# Patient Record
Sex: MALE | Race: WHITE | NOT HISPANIC OR LATINO | Employment: OTHER | URBAN - NONMETROPOLITAN AREA
[De-identification: names, ages, dates, MRNs, and addresses within clinical notes are randomized per-mention and may not be internally consistent; named-entity substitution may affect disease eponyms.]

---

## 2020-10-29 ENCOUNTER — OFFICE VISIT (OUTPATIENT)
Dept: CARDIOLOGY | Facility: CLINIC | Age: 60
End: 2020-10-29

## 2020-10-29 VITALS
HEIGHT: 75 IN | DIASTOLIC BLOOD PRESSURE: 80 MMHG | OXYGEN SATURATION: 100 % | WEIGHT: 227 LBS | HEART RATE: 78 BPM | BODY MASS INDEX: 28.23 KG/M2 | SYSTOLIC BLOOD PRESSURE: 138 MMHG

## 2020-10-29 DIAGNOSIS — Z82.49 FAMILY HISTORY OF EARLY CAD: ICD-10-CM

## 2020-10-29 DIAGNOSIS — I35.1 NONRHEUMATIC AORTIC VALVE INSUFFICIENCY: ICD-10-CM

## 2020-10-29 DIAGNOSIS — Q23.1 AORTIC REGURGITATION DUE TO BICUSPID AORTIC VALVE: Primary | ICD-10-CM

## 2020-10-29 DIAGNOSIS — E78.2 MIXED HYPERLIPIDEMIA: ICD-10-CM

## 2020-10-29 DIAGNOSIS — I51.7 LVH (LEFT VENTRICULAR HYPERTROPHY): ICD-10-CM

## 2020-10-29 PROCEDURE — 99204 OFFICE O/P NEW MOD 45 MIN: CPT | Performed by: INTERNAL MEDICINE

## 2020-10-29 PROCEDURE — 93000 ELECTROCARDIOGRAM COMPLETE: CPT | Performed by: INTERNAL MEDICINE

## 2020-10-29 RX ORDER — SIMVASTATIN 40 MG
40 TABLET ORAL NIGHTLY
COMMUNITY
End: 2021-03-15 | Stop reason: SDUPTHER

## 2020-10-29 RX ORDER — LATANOPROST 50 UG/ML
1 SOLUTION/ DROPS OPHTHALMIC NIGHTLY
COMMUNITY
End: 2021-10-12 | Stop reason: ALTCHOICE

## 2020-10-29 NOTE — PROGRESS NOTES
Melecio Magallon  [id number]  1960  60 y.o.  male    Referring Provider: Roberto Doss MD    Reason for  Visit:  Initial visit to establish care with myself for ongoing longitudinal care related to cardiovascular issues   Bicuspid aortic valve with moderate aortic insufficiency  Was seen in [facility]     Subjective    Overall feeling well   No chest pain or shortness of breath     No palpitations  No significant pedal edema    Compliant with medications and dietary advice  Good effort tolerance    No presyncope or syncope  Compliant with medications    Tolerating current medications well with no untoward side effects   Compliant with prescribed medication regimen. Tries to adhere to cardiac diet.        History of present illness:  Melecio Magallon is a 60 y.o. yo male with history of bicuspid aortic valve moderate aortic insufficiency  who presents today for   Chief Complaint   Patient presents with   • Establish Care     new pt -  MODERATE AS WITH BICUSPID VALVE   .    History  Past Medical History:   Diagnosis Date   • Aortic regurgitation    • Hyperlipidemia    ,   History reviewed. No pertinent surgical history.,   History reviewed. No pertinent family history.,   Social History     Tobacco Use   • Smoking status: Never Smoker   • Smokeless tobacco: Never Used   Substance Use Topics   • Alcohol use: Yes     Frequency: Never   • Drug use: Never   ,     Medications  Current Outpatient Medications   Medication Sig Dispense Refill   • latanoprost (XALATAN) 0.005 % ophthalmic solution 1 drop Every Night.     • simvastatin (ZOCOR) 40 MG tablet Take 40 mg by mouth Every Night.       No current facility-administered medications for this visit.        Allergies:  Patient has no known allergies.    Review of Systems  Review of Systems   Constitution: Negative.   HENT: Negative.    Eyes: Negative.    Cardiovascular: Negative for chest pain, claudication, cyanosis, dyspnea on exertion, irregular heartbeat,  "leg swelling, near-syncope, orthopnea, palpitations, paroxysmal nocturnal dyspnea and syncope.   Respiratory: Negative.    Endocrine: Negative.    Hematologic/Lymphatic: Negative.    Skin: Negative.    Gastrointestinal: Negative for anorexia.   Genitourinary: Negative.    Neurological: Negative.    Psychiatric/Behavioral: Negative.        Objective     Physical Exam:  /80   Pulse 78   Ht 190.5 cm (75\")   Wt 103 kg (227 lb)   SpO2 100%   BMI 28.37 kg/m²     Physical Exam  Constitutional:       Appearance: He is well-developed.   HENT:      Head: Normocephalic.   Neck:      Musculoskeletal: No edema.      Vascular: Normal carotid pulses. No carotid bruit or JVD.      Trachea: No tracheal tenderness or tracheal deviation.   Cardiovascular:      Rate and Rhythm: Regular rhythm.      Pulses: Normal pulses.      Heart sounds: Murmur present. Systolic murmur present with a grade of 2/6. Diastolic murmur present with a grade of 2/4.   Pulmonary:      Effort: Pulmonary effort is normal.      Breath sounds: No stridor.   Abdominal:      General: There is no distension.      Palpations: Abdomen is soft.      Tenderness: There is no abdominal tenderness.   Skin:     General: Skin is warm.   Neurological:      Mental Status: He is alert.      Cranial Nerves: No cranial nerve deficit.      Sensory: No sensory deficit.   Psychiatric:         Speech: Speech normal.         Behavior: Behavior normal.         Results Review:           ____________________________________________________________________________________________________________________________________________  Health maintenance and recommendations    Low salt/ HTN/ Heart healthy carbohydrate restricted cardiac diet   The patient is advised to reduce or avoid caffeine or other cardiac stimulants.   Minimize or avoid  NSAID-type medications      Monitor for any signs of bleeding including red or dark stools. Fall precautions.   Advised staying uptodate with " immunizations per established standard guidelines.    Offered to give patient  a copy of my notes     Questions were encouraged, asked and answered to the patient's  understanding and satisfaction. Questions if any regarding current medications and side effects, need for refills and importance of compliance to medications stressed.    Reviewed available prior notes, consults, prior visits, laboratory findings, radiology and cardiology relevant reports. Updated chart as applicable. I have reviewed the patient's medical history in detail and updated the computerized patient record as relevant.      Updated patient regarding any new or relevant abnormalities on review of records or any new findings on physical exam. Mentioned to patient about purpose of visit and desirable health short and long term goals and objectives.    Primary to monitor CBC CMP Lipid panel and TSH as applicable    ___________________________________________________________________________________________________________________________________________         ECG 12 Lead    Date/Time: 10/29/2020 8:56 AM  Performed by: Raul Monteiro MD  Authorized by: Raul Monteiro MD   Comparison: not compared with previous ECG   Rhythm: sinus rhythm  Rate: normal  Conduction: conduction normal  Q waves: III and aVF    QRS axis: normal    Clinical impression: abnormal EKG            Assessment/Plan   Diagnoses and all orders for this visit:    1. Aortic regurgitation due to bicuspid aortic valve (Primary)  -     Adult Transthoracic Echo Complete W/ Cont if Necessary Per Protocol; Future    2. Nonrheumatic aortic valve insufficiency    3. Mixed hyperlipidemia  -     CT Cardiac Calcium Score Without Dye; Future    4. LVH (left ventricular hypertrophy)    5. Family history of early CAD    Other orders  -     ECG 12 Lead        Plan      Orders Placed This Encounter   Procedures   • CT Cardiac Calcium Score Without Dye     Standing Status:   Future     Standing  Expiration Date:   10/29/2021   • ECG 12 Lead     This order was created via procedure documentation   • Adult Transthoracic Echo Complete W/ Cont if Necessary Per Protocol     Myocardial strain to be performed during echocardiogram as long as technically feasible     Standing Status:   Future     Standing Expiration Date:   10/29/2021     Order Specific Question:   Reason for exam?     Answer:   Valvular Function     Call for results of calcium score to direct therapy including Aspirin and statin     Check BP and heart rates twice daily at least 3x / week at home and bring a recording for me to review next visit  If BP >140/90 or < 100/60 persistently over 3 reading 30 mins apart call sooner     Absolute aortic root size is enlarged but indexed is OK       Return in about 5 months (around 3/29/2021).

## 2020-11-11 ENCOUNTER — HOSPITAL ENCOUNTER (OUTPATIENT)
Dept: CT IMAGING | Facility: HOSPITAL | Age: 60
Discharge: HOME | End: 2020-11-11
Admitting: INTERNAL MEDICINE

## 2020-11-11 DIAGNOSIS — E78.2 MIXED HYPERLIPIDEMIA: ICD-10-CM

## 2020-11-11 PROCEDURE — 75571 CT HRT W/O DYE W/CA TEST: CPT | Performed by: INTERNAL MEDICINE

## 2020-11-11 PROCEDURE — 75571 CT HRT W/O DYE W/CA TEST: CPT

## 2020-11-16 ENCOUNTER — TELEPHONE (OUTPATIENT)
Dept: CARDIOLOGY | Facility: CLINIC | Age: 60
End: 2020-11-16

## 2020-11-16 NOTE — TELEPHONE ENCOUNTER
PT CALLED WOULD LIKE TO KNOW THE RESULTS OF CTA DONE ON 11/11/20    ALSO NEEDS TO KNOW IF YOU ARE GOING TO CHANGE SIMVASTATIN TO CRESTOR?    PLEASE ADVISE

## 2020-12-05 ENCOUNTER — APPOINTMENT (OUTPATIENT)
Dept: CT IMAGING | Facility: HOSPITAL | Age: 60
End: 2020-12-05

## 2020-12-05 ENCOUNTER — HOSPITAL ENCOUNTER (EMERGENCY)
Facility: HOSPITAL | Age: 60
Discharge: HOME | End: 2020-12-05
Attending: EMERGENCY MEDICINE | Admitting: EMERGENCY MEDICINE

## 2020-12-05 VITALS
DIASTOLIC BLOOD PRESSURE: 80 MMHG | TEMPERATURE: 98.1 F | HEIGHT: 75 IN | BODY MASS INDEX: 28.85 KG/M2 | SYSTOLIC BLOOD PRESSURE: 136 MMHG | HEART RATE: 59 BPM | OXYGEN SATURATION: 100 % | RESPIRATION RATE: 16 BRPM | WEIGHT: 232 LBS

## 2020-12-05 DIAGNOSIS — R10.9 RIGHT FLANK PAIN: ICD-10-CM

## 2020-12-05 DIAGNOSIS — N23 RENAL COLIC ON RIGHT SIDE: ICD-10-CM

## 2020-12-05 DIAGNOSIS — R39.15 URINARY URGENCY: ICD-10-CM

## 2020-12-05 DIAGNOSIS — N13.2 HYDRONEPHROSIS WITH URINARY OBSTRUCTION DUE TO URETERAL CALCULUS: Primary | ICD-10-CM

## 2020-12-05 LAB
ALBUMIN SERPL-MCNC: 4.8 G/DL (ref 3.5–5.2)
ALBUMIN/GLOB SERPL: 2.7 G/DL
ALP SERPL-CCNC: 64 U/L (ref 39–117)
ALT SERPL W P-5'-P-CCNC: 18 U/L (ref 1–41)
ANION GAP SERPL CALCULATED.3IONS-SCNC: 10 MMOL/L (ref 5–15)
AST SERPL-CCNC: 21 U/L (ref 1–40)
BACTERIA UR QL AUTO: ABNORMAL /HPF
BASOPHILS # BLD AUTO: 0.02 10*3/MM3 (ref 0–0.2)
BASOPHILS NFR BLD AUTO: 0.2 % (ref 0–1.5)
BILIRUB SERPL-MCNC: 0.3 MG/DL (ref 0–1.2)
BILIRUB UR QL STRIP: NEGATIVE
BUN SERPL-MCNC: 22 MG/DL (ref 6–20)
BUN/CREAT SERPL: 19.5 (ref 7–25)
CALCIUM SPEC-SCNC: 9.2 MG/DL (ref 8.6–10.5)
CHLORIDE SERPL-SCNC: 108 MMOL/L (ref 98–107)
CLARITY UR: CLEAR
CO2 SERPL-SCNC: 24 MMOL/L (ref 22–29)
COLOR UR: YELLOW
CREAT SERPL-MCNC: 1.13 MG/DL (ref 0.76–1.27)
D-LACTATE SERPL-SCNC: 1.5 MMOL/L (ref 0.5–2)
DEPRECATED RDW RBC AUTO: 39.3 FL (ref 37–54)
EOSINOPHIL # BLD AUTO: 0.02 10*3/MM3 (ref 0–0.4)
EOSINOPHIL NFR BLD AUTO: 0.2 % (ref 0.3–6.2)
ERYTHROCYTE [DISTWIDTH] IN BLOOD BY AUTOMATED COUNT: 12.5 % (ref 12.3–15.4)
GFR SERPL CREATININE-BSD FRML MDRD: 66 ML/MIN/1.73
GLOBULIN UR ELPH-MCNC: 1.8 GM/DL
GLUCOSE SERPL-MCNC: 122 MG/DL (ref 65–99)
GLUCOSE UR STRIP-MCNC: NEGATIVE MG/DL
HCT VFR BLD AUTO: 38.7 % (ref 37.5–51)
HGB BLD-MCNC: 12.8 G/DL (ref 13–17.7)
HGB UR QL STRIP.AUTO: ABNORMAL
HOLD SPECIMEN: NORMAL
HOLD SPECIMEN: NORMAL
HYALINE CASTS UR QL AUTO: ABNORMAL /LPF
IMM GRANULOCYTES # BLD AUTO: 0.04 10*3/MM3 (ref 0–0.05)
IMM GRANULOCYTES NFR BLD AUTO: 0.4 % (ref 0–0.5)
KETONES UR QL STRIP: NEGATIVE
LEUKOCYTE ESTERASE UR QL STRIP.AUTO: NEGATIVE
LIPASE SERPL-CCNC: 55 U/L (ref 13–60)
LYMPHOCYTES # BLD AUTO: 1.45 10*3/MM3 (ref 0.7–3.1)
LYMPHOCYTES NFR BLD AUTO: 13.2 % (ref 19.6–45.3)
MCH RBC QN AUTO: 28.4 PG (ref 26.6–33)
MCHC RBC AUTO-ENTMCNC: 33.1 G/DL (ref 31.5–35.7)
MCV RBC AUTO: 85.8 FL (ref 79–97)
MONOCYTES # BLD AUTO: 0.4 10*3/MM3 (ref 0.1–0.9)
MONOCYTES NFR BLD AUTO: 3.6 % (ref 5–12)
NEUTROPHILS NFR BLD AUTO: 82.4 % (ref 42.7–76)
NEUTROPHILS NFR BLD AUTO: 9.09 10*3/MM3 (ref 1.7–7)
NITRITE UR QL STRIP: NEGATIVE
NRBC BLD AUTO-RTO: 0 /100 WBC (ref 0–0.2)
PH UR STRIP.AUTO: <=5 [PH] (ref 5–8)
PLATELET # BLD AUTO: 184 10*3/MM3 (ref 140–450)
PMV BLD AUTO: 11.2 FL (ref 6–12)
POTASSIUM SERPL-SCNC: 4.4 MMOL/L (ref 3.5–5.2)
PROT SERPL-MCNC: 6.6 G/DL (ref 6–8.5)
PROT UR QL STRIP: NEGATIVE
RBC # BLD AUTO: 4.51 10*6/MM3 (ref 4.14–5.8)
RBC # UR: ABNORMAL /HPF
REF LAB TEST METHOD: ABNORMAL
SODIUM SERPL-SCNC: 142 MMOL/L (ref 136–145)
SP GR UR STRIP: >1.03 (ref 1–1.03)
SQUAMOUS #/AREA URNS HPF: ABNORMAL /HPF
UROBILINOGEN UR QL STRIP: ABNORMAL
WBC # BLD AUTO: 11.02 10*3/MM3 (ref 3.4–10.8)
WBC UR QL AUTO: ABNORMAL /HPF
WHOLE BLOOD HOLD SPECIMEN: NORMAL

## 2020-12-05 PROCEDURE — 99283 EMERGENCY DEPT VISIT LOW MDM: CPT

## 2020-12-05 PROCEDURE — 25010000003 HYDROMORPHONE 1 MG/ML SOLUTION: Performed by: EMERGENCY MEDICINE

## 2020-12-05 PROCEDURE — 74176 CT ABD & PELVIS W/O CONTRAST: CPT

## 2020-12-05 PROCEDURE — 25010000002 KETOROLAC TROMETHAMINE PER 15 MG: Performed by: EMERGENCY MEDICINE

## 2020-12-05 PROCEDURE — 80053 COMPREHEN METABOLIC PANEL: CPT | Performed by: EMERGENCY MEDICINE

## 2020-12-05 PROCEDURE — 25010000002 MORPHINE PER 10 MG: Performed by: EMERGENCY MEDICINE

## 2020-12-05 PROCEDURE — 83690 ASSAY OF LIPASE: CPT | Performed by: EMERGENCY MEDICINE

## 2020-12-05 PROCEDURE — 96374 THER/PROPH/DIAG INJ IV PUSH: CPT

## 2020-12-05 PROCEDURE — 83605 ASSAY OF LACTIC ACID: CPT | Performed by: EMERGENCY MEDICINE

## 2020-12-05 PROCEDURE — 25010000002 ONDANSETRON PER 1 MG: Performed by: EMERGENCY MEDICINE

## 2020-12-05 PROCEDURE — 81001 URINALYSIS AUTO W/SCOPE: CPT | Performed by: EMERGENCY MEDICINE

## 2020-12-05 PROCEDURE — 96375 TX/PRO/DX INJ NEW DRUG ADDON: CPT

## 2020-12-05 PROCEDURE — 85025 COMPLETE CBC W/AUTO DIFF WBC: CPT | Performed by: EMERGENCY MEDICINE

## 2020-12-05 RX ORDER — KETOROLAC TROMETHAMINE 15 MG/ML
15 INJECTION, SOLUTION INTRAMUSCULAR; INTRAVENOUS ONCE
Status: COMPLETED | OUTPATIENT
Start: 2020-12-05 | End: 2020-12-05

## 2020-12-05 RX ORDER — ONDANSETRON 2 MG/ML
4 INJECTION INTRAMUSCULAR; INTRAVENOUS ONCE
Status: COMPLETED | OUTPATIENT
Start: 2020-12-05 | End: 2020-12-05

## 2020-12-05 RX ORDER — TAMSULOSIN HYDROCHLORIDE 0.4 MG/1
1 CAPSULE ORAL DAILY
Qty: 7 CAPSULE | Refills: 0 | Status: SHIPPED | OUTPATIENT
Start: 2020-12-05 | End: 2020-12-12

## 2020-12-05 RX ORDER — HYDROCODONE BITARTRATE AND ACETAMINOPHEN 5; 325 MG/1; MG/1
1 TABLET ORAL EVERY 6 HOURS PRN
Qty: 8 TABLET | Refills: 0 | Status: SHIPPED | OUTPATIENT
Start: 2020-12-05 | End: 2020-12-07

## 2020-12-05 RX ORDER — KETOROLAC TROMETHAMINE 10 MG/1
10 TABLET, FILM COATED ORAL EVERY 6 HOURS PRN
Qty: 12 TABLET | Refills: 0 | Status: SHIPPED | OUTPATIENT
Start: 2020-12-05 | End: 2020-12-08

## 2020-12-05 RX ORDER — SODIUM CHLORIDE 0.9 % (FLUSH) 0.9 %
10 SYRINGE (ML) INJECTION AS NEEDED
Status: DISCONTINUED | OUTPATIENT
Start: 2020-12-05 | End: 2020-12-05 | Stop reason: HOSPADM

## 2020-12-05 RX ADMIN — MORPHINE SULFATE 4 MG: 4 INJECTION, SOLUTION INTRAMUSCULAR; INTRAVENOUS at 11:13

## 2020-12-05 RX ADMIN — KETOROLAC TROMETHAMINE 15 MG: 15 INJECTION, SOLUTION INTRAMUSCULAR; INTRAVENOUS at 12:20

## 2020-12-05 RX ADMIN — HYDROMORPHONE HYDROCHLORIDE 1 MG: 1 INJECTION, SOLUTION INTRAMUSCULAR; INTRAVENOUS; SUBCUTANEOUS at 12:20

## 2020-12-05 RX ADMIN — SODIUM CHLORIDE, POTASSIUM CHLORIDE, SODIUM LACTATE AND CALCIUM CHLORIDE 1000 ML: 600; 310; 30; 20 INJECTION, SOLUTION INTRAVENOUS at 11:09

## 2020-12-05 RX ADMIN — ONDANSETRON HYDROCHLORIDE 4 MG: 2 SOLUTION INTRAMUSCULAR; INTRAVENOUS at 11:11

## 2020-12-05 NOTE — DISCHARGE INSTRUCTIONS
Please return immediately to the emergency department for any new or worsening symptoms. Please schedule and appointment with Dr. Howard if symptoms persist for further management.

## 2020-12-05 NOTE — ED PROVIDER NOTES
Subjective   This is a 60-year-old gentleman with a past medical history of aortic regurgitation who presents the emergency department chief complaint of right flank pain.  When patient woke up this morning he had urinary urgency.  This then turned into right flank pain.  Gradual in onset is now constant.  Moderate.  Sharp.  Starts in his right flank and radiates to his right groin.  Associated with urinary urgency.  Was seen by his PCP who did a urinalysis which was negative and was sent here for further evaluation.  He denies any headache, vision changes, chest pain, shortness of breath, abdominal pain, nausea, vomiting, testicular pain, fevers, chills, numbness, weakness, or paresthesias.    Past medical history: Aortic regurgitation  Social history: Denies tobacco or illicit drug use, drinks alcohol rarely.      History provided by:  Patient      Review of Systems   All other systems reviewed and are negative.      Past Medical History:   Diagnosis Date   • Aortic regurgitation    • Glaucoma    • Hyperlipidemia        No Known Allergies    History reviewed. No pertinent surgical history.    History reviewed. No pertinent family history.    Social History     Socioeconomic History   • Marital status:      Spouse name: Not on file   • Number of children: Not on file   • Years of education: Not on file   • Highest education level: Not on file   Tobacco Use   • Smoking status: Never Smoker   • Smokeless tobacco: Never Used   Substance and Sexual Activity   • Alcohol use: Yes     Frequency: Never   • Drug use: Never   • Sexual activity: Defer           Objective   Physical Exam  Constitutional:       Appearance: Normal appearance.   HENT:      Head: Normocephalic and atraumatic.      Nose: Nose normal.      Mouth/Throat:      Mouth: Mucous membranes are moist.   Eyes:      Extraocular Movements: Extraocular movements intact.   Neck:      Musculoskeletal: Normal range of motion.   Cardiovascular:      Rate and  Rhythm: Normal rate and regular rhythm.      Pulses: Normal pulses.      Heart sounds: Normal heart sounds. No murmur. No friction rub. No gallop.    Pulmonary:      Effort: Pulmonary effort is normal. No respiratory distress.      Breath sounds: Normal breath sounds. No stridor. No wheezing, rhonchi or rales.   Chest:      Chest wall: No tenderness.   Abdominal:      General: Abdomen is flat. Bowel sounds are normal. There is no distension.      Palpations: Abdomen is soft. There is no mass.      Tenderness: There is no abdominal tenderness. There is no right CVA tenderness, left CVA tenderness, guarding or rebound.      Comments: No CVA tenderness.  No lesions overlying the right flank.   Musculoskeletal: Normal range of motion.         General: No swelling, tenderness, deformity or signs of injury.   Skin:     General: Skin is warm and dry.      Capillary Refill: Capillary refill takes less than 2 seconds.      Coloration: Skin is not jaundiced or pale.      Findings: No bruising, erythema or rash.   Neurological:      General: No focal deficit present.      Mental Status: He is alert and oriented to person, place, and time.   Psychiatric:         Mood and Affect: Mood normal.         Behavior: Behavior normal.         Thought Content: Thought content normal.         Judgment: Judgment normal.         Procedures           ED Course                                           MDM  Number of Diagnoses or Management Options  Hydronephrosis with urinary obstruction due to ureteral calculus: new and requires workup  Renal colic on right side: new and requires workup  Right flank pain: new and requires workup  Urinary urgency: new and requires workup  Diagnosis management comments: Patient presents with right flank pain.  Upon arrival in no acute distress vital signs are reassuring.  IV access is obtained and labs are sent.  He is given fluids, morphine, Zofran.  He is evaluated CT scan of the abdomen and pelvis without  contrast.  Low concern for testicular torsion or epididymitis no testicular pain.  Low concern for abdominal aortic aneurysm with strong bilateral lower extremity pulses and no abdominal pain or tenderness.  Low concern for intra-abdominal pathology with no abdominal tenderness on exam.Lab work shows a CBC with slight leukocytosis, there is also slight anemia, lipase within normal limits, lactic acid is 1.5, CMP with no signs of kidney injury, no elevation of the anion gap, LFTs are normal, urinalysis with 3-5 reds and 0-2 whites with negative nitrites and negative leukocytes, CT scan of the abdomen and pelvis without contrast shows a mild to moderate right-sided obstructive uropathy due to a 2 mm calculus at the UVJ.  There is also a 1.5 mm stone in the right pole of the kidney.  I have discussed the case with Dr. Bonilla Paz.  He will see the patient in clinic.  The patient is comfortable going home.  He is discharged in good condition with normal vitals and is given commonsense return precautions which he verbalizes understanding of.       Amount and/or Complexity of Data Reviewed  Clinical lab tests: ordered and reviewed  Tests in the radiology section of CPT®: ordered and reviewed  Decide to obtain previous medical records or to obtain history from someone other than the patient: yes  Discuss the patient with other providers: yes (Dr. Howard)    Risk of Complications, Morbidity, and/or Mortality  Presenting problems: high  Diagnostic procedures: high  Management options: high    Patient Progress  Patient progress: improved      Final diagnoses:   Right flank pain   Urinary urgency   Renal colic on right side   Hydronephrosis with urinary obstruction due to ureteral calculus            Suresh Churchill MD  12/05/20 [id number]

## 2020-12-07 NOTE — PROGRESS NOTES
Subjective    Mr. Magallon is 60 y.o. male    Chief Complaint: Right Ureteral stone    History of Present Illness  60-year-old male new patient referred by ER for right ureteral stone.  Onset 12/5/2020.  Severity improved.  Location right distal ureter.  CT abdomen pelvis reviewed with the patient today showed a 2 mm right UVJ stone as well as a 2 mm right upper pole kidney stone which is too small to be seen on K[facility] x-ray today.  Associated symptoms his flank pain has resolved.  He denies gross hematuria or bothersome LUTS.  Context no history of stones previously.  His brother has a history of prostate cancer.  Patient states his PSAs have always been normal most recently 0.83 last year.    I independently visualized and reviewed the patient's prior imaging studies today in clinic and discussed the imaging findings with the patient.      The following portions of the patient's history were reviewed and updated as appropriate: allergies, current medications, past family history, past medical history, past social history, past surgical history and problem list.    Review of Systems   Constitutional: Negative for chills and fever.   Gastrointestinal: Negative for abdominal pain, anal bleeding, blood in stool, nausea and vomiting.   Genitourinary: Negative for dysuria, flank pain, frequency, hematuria and urgency.   All other systems reviewed and are negative.        Current Outpatient Medications:   •  HYDROcodone-acetaminophen (NORCO) 5-325 MG per tablet, Take 1 tablet by mouth Every 6 (Six) Hours As Needed for Moderate Pain  for up to 2 days., Disp: 8 tablet, Rfl: 0  •  ketorolac (TORADOL) 10 MG tablet, Take 1 tablet by mouth Every 6 (Six) Hours As Needed for Moderate Pain  for up to 3 days., Disp: 12 tablet, Rfl: 0  •  latanoprost (XALATAN) 0.005 % ophthalmic solution, 1 drop Every Night., Disp: , Rfl:   •  simvastatin (ZOCOR) 40 MG tablet, Take 40 mg by mouth Every Night., Disp: , Rfl:   •  tamsulosin (FLOMAX) 0.4  MG capsule 24 hr capsule, Take 1 capsule by mouth Daily for 7 days., Disp: 7 capsule, Rfl: 0    Past Medical History:   Diagnosis Date   • Aortic regurgitation    • Glaucoma    • Hyperlipidemia        No past surgical history on file.    Social History     Socioeconomic History   • Marital status:      Spouse name: Not on file   • Number of children: Not on file   • Years of education: Not on file   • Highest education level: Not on file   Tobacco Use   • Smoking status: Never Smoker   • Smokeless tobacco: Never Used   Substance and Sexual Activity   • Alcohol use: Yes     Frequency: Never   • Drug use: Never   • Sexual activity: Defer       No family history on file.    Objective    There were no vitals taken for this visit.    Physical Exam  Constitutional: Well nourished, Well developed; No apparent distress; Vital reviewed as above  Psychiatric: Appropriate affect; Alert and oriented  Eyes: Unremarkable  Musculoskeletal: Normal gait and station  GI: Abdomen is soft, non-tender  Respiratory: No distress; Unlabored movement; No accessory musculature needed with symmetric movements  Skin: No pallor or diaphoresis  Lymphatic: No adenopathy neck or groin      Results for orders placed or performed during the hospital encounter of 12/05/20   Comprehensive Metabolic Panel    Specimen: Hand, Left; Blood   Result Value Ref Range    Glucose 122 (H) 65 - 99 mg/dL    BUN 22 (H) 6 - 20 mg/dL    Creatinine 1.13 0.76 - 1.27 mg/dL    Sodium 142 136 - 145 mmol/L    Potassium 4.4 3.5 - 5.2 mmol/L    Chloride 108 (H) 98 - 107 mmol/L    CO2 24.0 22.0 - 29.0 mmol/L    Calcium 9.2 8.6 - 10.5 mg/dL    Total Protein 6.6 6.0 - 8.5 g/dL    Albumin 4.80 3.50 - 5.20 g/dL    ALT (SGPT) 18 1 - 41 U/L    AST (SGOT) 21 1 - 40 U/L    Alkaline Phosphatase 64 39 - 117 U/L    Total Bilirubin 0.3 0.0 - 1.2 mg/dL    eGFR Non African Amer 66 >60 mL/min/1.73    Globulin 1.8 gm/dL    A/G Ratio 2.7 g/dL    BUN/Creatinine Ratio 19.5 7.0 - 25.0     Anion Gap 10.0 5.0 - 15.0 mmol/L   Lipase    Specimen: Hand, Left; Blood   Result Value Ref Range    Lipase 55 13 - 60 U/L   Lactic Acid, Plasma    Specimen: Hand, Left; Blood   Result Value Ref Range    Lactate 1.5 0.5 - 2.0 mmol/L   Urinalysis With Culture If Indicated - Urine, Clean Catch    Specimen: Urine, Clean Catch   Result Value Ref Range    Color, UA Yellow Yellow, Straw    Appearance, UA Clear Clear    pH, UA <=5.0 5.0 - 8.0    Specific Gravity, UA >1.030 (H) 1.005 - 1.030    Glucose, UA Negative Negative    Ketones, UA Negative Negative    Bilirubin, UA Negative Negative    Blood, UA Trace (A) Negative    Protein, UA Negative Negative    Leuk Esterase, UA Negative Negative    Nitrite, UA Negative Negative    Urobilinogen, UA 0.2 E.U./dL 0.2 - 1.0 E.U./dL   CBC Auto Differential    Specimen: Hand, Left; Blood   Result Value Ref Range    WBC 11.02 (H) 3.40 - 10.80 10*3/mm3    RBC 4.51 4.14 - 5.80 10*6/mm3    Hemoglobin 12.8 (L) 13.0 - 17.7 g/dL    Hematocrit 38.7 37.5 - 51.0 %    MCV 85.8 79.0 - 97.0 fL    MCH 28.4 26.6 - 33.0 pg    MCHC 33.1 31.5 - 35.7 g/dL    RDW 12.5 12.3 - 15.4 %    RDW-SD 39.3 37.0 - 54.0 fl    MPV 11.2 6.0 - 12.0 fL    Platelets 184 140 - 450 10*3/mm3    Neutrophil % 82.4 (H) 42.7 - 76.0 %    Lymphocyte % 13.2 (L) 19.6 - 45.3 %    Monocyte % 3.6 (L) 5.0 - 12.0 %    Eosinophil % 0.2 (L) 0.3 - 6.2 %    Basophil % 0.2 0.0 - 1.5 %    Immature Grans % 0.4 0.0 - 0.5 %    Neutrophils, Absolute 9.09 (H) 1.70 - 7.00 10*3/mm3    Lymphocytes, Absolute 1.45 0.70 - 3.10 10*3/mm3    Monocytes, Absolute 0.40 0.10 - 0.90 10*3/mm3    Eosinophils, Absolute 0.02 0.00 - 0.40 10*3/mm3    Basophils, Absolute 0.02 0.00 - 0.20 10*3/mm3    Immature Grans, Absolute 0.04 0.00 - 0.05 10*3/mm3    nRBC 0.0 0.0 - 0.2 /100 WBC   Urinalysis, Microscopic Only - Urine, Clean Catch    Specimen: Urine, Clean Catch   Result Value Ref Range    RBC, UA 3-5 (A) None Seen /HPF    WBC, UA 0-2 (A) None Seen /HPF    Bacteria,  UA None Seen None Seen /HPF    Squamous Epithelial Cells, UA 0-2 None Seen, 0-2 /HPF    Hyaline Casts, UA 3-6 None Seen /LPF    Methodology Automated Microscopy    Light Blue Top   Result Value Ref Range    Extra Tube hold for add-on    Red Top   Result Value Ref Range    Extra Tube Hold for add-ons.    Robeline Blood Culture Bottle Set    Specimen: Hand, Left; Blood   Result Value Ref Range    Extra Tube Hold for add-ons.      Assessment and Plan    Diagnoses and all orders for this visit:    1. Kidney stone (Primary)  -     XR Abdomen K[facility]  -     XR abdomen kub; Future    2. Right ureteral stone  -     POC Urinalysis Dipstick, Multipro    3. Mixed hyperlipidemia    4. Family history of prostate cancer    Likely passed/missed stone with normal UA today.  His remaining right kidney stone is likely too small to be seen on [facility].  He will follow-up in 1 year with preclinic [facility] or sooner as needed.  He will get his PSAs checked annually with this primary care physician for his family history of prostate cancer.    We discussed recommendations for reducing future risk of stone formation and/or stone enlargement including increasing fluid intake with a goal of 6 L daily to achieve a urinary output of 2 to 2.5 L daily, low oxalate diet, benefits of dietary citrate, reducing purine intake, and no added salt.        This document has been signed by MORRO Murphy MD on December 10, 2020 17:09 CST

## 2020-12-10 ENCOUNTER — HOSPITAL ENCOUNTER (OUTPATIENT)
Dept: GENERAL RADIOLOGY | Facility: HOSPITAL | Age: 60
Discharge: HOME | End: 2020-12-10
Admitting: UROLOGY

## 2020-12-10 ENCOUNTER — OFFICE VISIT (OUTPATIENT)
Dept: UROLOGY | Facility: CLINIC | Age: 60
End: 2020-12-10

## 2020-12-10 VITALS — HEIGHT: 75 IN | WEIGHT: 233.4 LBS | TEMPERATURE: 98 F | BODY MASS INDEX: 29.02 KG/M2

## 2020-12-10 DIAGNOSIS — N20.1 RIGHT URETERAL STONE: ICD-10-CM

## 2020-12-10 DIAGNOSIS — N20.0 KIDNEY STONE: Primary | ICD-10-CM

## 2020-12-10 DIAGNOSIS — Z80.42 FAMILY HISTORY OF PROSTATE CANCER: ICD-10-CM

## 2020-12-10 DIAGNOSIS — E78.2 MIXED HYPERLIPIDEMIA: ICD-10-CM

## 2020-12-10 LAB
BILIRUB BLD-MCNC: NEGATIVE MG/DL
CLARITY, POC: CLEAR
COLOR UR: YELLOW
GLUCOSE UR STRIP-MCNC: NEGATIVE MG/DL
KETONES UR QL: NEGATIVE
LEUKOCYTE EST, POC: NEGATIVE
NITRITE UR-MCNC: NEGATIVE MG/ML
PH UR: 6 [PH] (ref 5–8)
PROT UR STRIP-MCNC: NEGATIVE MG/DL
RBC # UR STRIP: NEGATIVE /UL
SP GR UR: 1.01 (ref 1–1.03)
UROBILINOGEN UR QL: NORMAL

## 2020-12-10 PROCEDURE — 99203 OFFICE O/P NEW LOW 30 MIN: CPT | Performed by: UROLOGY

## 2020-12-10 PROCEDURE — 74018 RADEX ABDOMEN 1 VIEW: CPT

## 2020-12-10 PROCEDURE — 81003 URINALYSIS AUTO W/O SCOPE: CPT | Performed by: UROLOGY

## 2020-12-10 NOTE — PATIENT INSTRUCTIONS
"BMI for Adults  What is BMI?  Body mass index (BMI) is a number that is calculated from a person's weight and height. BMI can help estimate how much of a person's weight is composed of fat. BMI does not measure body fat directly. Rather, it is an alternative to procedures that directly measure body fat, which can be difficult and expensive.  BMI can help identify people who may be at higher risk for certain medical problems.  What are BMI measurements used for?  BMI is used as a screening tool to identify possible weight problems. It helps determine whether a person is obese, overweight, a healthy weight, or underweight.  BMI is useful for:  · Identifying a weight problem that may be related to a medical condition or may increase the risk for medical problems.  · Promoting changes, such as changes in diet and exercise, to help reach a healthy weight. BMI screening can be repeated to see if these changes are working.  How is BMI calculated?  BMI involves measuring your weight in relation to your height. Both height and weight are measured, and the BMI is calculated from those numbers. This can be done either in English ([place] or metric measurements. Note that charts and online BMI calculators are available to help you find your BMI quickly and easily without having to do these calculations yourself.  To calculate your BMI in English ([place] measurements:    1. Measure your weight in pounds (lb).  2. Multiply the number of pounds by 703.  ? For example, for a person who weighs 180 lb, multiply that number by 703, which equals 126,540.  3. Measure your height in inches. Then multiply that number by itself to get a measurement called \"inches squared.\"  ? For example, for a person who is 70 inches tall, the \"inches squared\" measurement is 70 inches x 70 inches, which equals 4,900 inches squared.  4. Divide the total from step 2 (number of lb x 703) by the total from step 3 (inches squared): 126,540 ÷ 4,900 = 25.8. This is " "your BMI.  To calculate your BMI in metric measurements:  1. Measure your weight in kilograms (kg).  2. Measure your height in meters (m). Then multiply that number by itself to get a measurement called \"meters squared.\"  ? For example, for a person who is 1.75 m tall, the \"meters squared\" measurement is 1.75 m x 1.75 m, which is equal to 3.1 meters squared.  3. Divide the number of kilograms (your weight) by the meters squared number. In this example: 70 ÷ 3.1 = 22.6. This is your BMI.  What do the results mean?  BMI charts are used to identify whether you are underweight, normal weight, overweight, or obese. The following guidelines will be used:  · Underweight: BMI less than 18.5.  · Normal weight: BMI between 18.5 and 24.9.  · Overweight: BMI between 25 and 29.9.  · Obese: BMI of 30 or above.  Keep these notes in mind:  · Weight includes both fat and muscle, so someone with a muscular build, such as an athlete, may have a BMI that is higher than 24.9. In cases like these, BMI is not an accurate measure of body fat.  · To determine if excess body fat is the cause of a BMI of 25 or higher, further assessments may need to be done by a health care provider.  · BMI is usually interpreted in the same way for men and women.  Where to find more information  For more information about BMI, including tools to quickly calculate your BMI, go to these websites:  · Centers for Disease Control and Prevention: www.cdc.gov  · American Heart Association: www.heart.org  · National Heart, Lung, and Blood [place]: www.nhlbi.nih.gov  Summary  · Body mass index (BMI) is a number that is calculated from a person's weight and height.  · BMI may help estimate how much of a person's weight is composed of fat. BMI can help identify those who may be at higher risk for certain medical problems.  · BMI can be measured using English measurements or metric measurements.  · BMI charts are used to identify whether you are underweight, normal " weight, overweight, or obese.  This information is not intended to replace advice given to you by your health care provider. Make sure you discuss any questions you have with your health care provider.  Document Revised: 09/09/2020 Document Reviewed: 07/17/2020  Elsevier Patient Education © 2020 [employer]

## 2021-03-08 ENCOUNTER — APPOINTMENT (OUTPATIENT)
Dept: CARDIOLOGY | Facility: HOSPITAL | Age: 61
End: 2021-03-08

## 2021-03-15 RX ORDER — SIMVASTATIN 40 MG
40 TABLET ORAL NIGHTLY
Qty: 90 TABLET | Refills: 4 | Status: SHIPPED | OUTPATIENT
Start: 2021-03-15 | End: 2022-05-31

## 2021-04-08 ENCOUNTER — HOSPITAL ENCOUNTER (OUTPATIENT)
Dept: CARDIOLOGY | Facility: HOSPITAL | Age: 61
Discharge: HOME | End: 2021-04-08
Admitting: INTERNAL MEDICINE

## 2021-04-08 VITALS
SYSTOLIC BLOOD PRESSURE: 138 MMHG | WEIGHT: 233 LBS | HEIGHT: 75 IN | DIASTOLIC BLOOD PRESSURE: 81 MMHG | BODY MASS INDEX: 28.97 KG/M2

## 2021-04-08 DIAGNOSIS — Q23.1 AORTIC REGURGITATION DUE TO BICUSPID AORTIC VALVE: ICD-10-CM

## 2021-04-08 PROCEDURE — 93356 MYOCRD STRAIN IMG SPCKL TRCK: CPT | Performed by: INTERNAL MEDICINE

## 2021-04-08 PROCEDURE — 93306 TTE W/DOPPLER COMPLETE: CPT

## 2021-04-08 PROCEDURE — 93356 MYOCRD STRAIN IMG SPCKL TRCK: CPT

## 2021-04-08 PROCEDURE — 93306 TTE W/DOPPLER COMPLETE: CPT | Performed by: INTERNAL MEDICINE

## 2021-04-10 LAB
BH CV ECHO MEAS - AO MAX PG (FULL): 38.7 MMHG
BH CV ECHO MEAS - AO MAX PG: 42.5 MMHG
BH CV ECHO MEAS - AO MEAN PG (FULL): 23 MMHG
BH CV ECHO MEAS - AO MEAN PG: 25 MMHG
BH CV ECHO MEAS - AO ROOT AREA (BSA CORRECTED): 1.8
BH CV ECHO MEAS - AO ROOT AREA: 13.2 CM^2
BH CV ECHO MEAS - AO ROOT DIAM: 4.1 CM
BH CV ECHO MEAS - AO V2 MAX: 326 CM/SEC
BH CV ECHO MEAS - AO V2 MEAN: 237.5 CM/SEC
BH CV ECHO MEAS - AO V2 VTI: 82.1 CM
BH CV ECHO MEAS - AVA(I,A): 1.4 CM^2
BH CV ECHO MEAS - AVA(I,D): 1.4 CM^2
BH CV ECHO MEAS - AVA(V,A): 1.4 CM^2
BH CV ECHO MEAS - AVA(V,D): 1.4 CM^2
BH CV ECHO MEAS - BSA(HAYCOCK): 2.4 M^2
BH CV ECHO MEAS - BSA: 2.3 M^2
BH CV ECHO MEAS - BZI_BMI: 29.1 KILOGRAMS/M^2
BH CV ECHO MEAS - BZI_METRIC_HEIGHT: 190.5 CM
BH CV ECHO MEAS - BZI_METRIC_WEIGHT: 105.7 KG
BH CV ECHO MEAS - EDV(CUBED): 101.2 ML
BH CV ECHO MEAS - EDV(MOD-SP4): 160 ML
BH CV ECHO MEAS - EDV(TEICH): 100.3 ML
BH CV ECHO MEAS - EF(CUBED): 68.8 %
BH CV ECHO MEAS - EF(MOD-SP4): 67.4 %
BH CV ECHO MEAS - EF(TEICH): 60.4 %
BH CV ECHO MEAS - ESV(CUBED): 31.6 ML
BH CV ECHO MEAS - ESV(MOD-SP4): 52.1 ML
BH CV ECHO MEAS - ESV(TEICH): 39.7 ML
BH CV ECHO MEAS - FS: 32.2 %
BH CV ECHO MEAS - IVS/LVPW: 1.1
BH CV ECHO MEAS - IVSD: 1.4 CM
BH CV ECHO MEAS - LA DIMENSION: 4.1 CM
BH CV ECHO MEAS - LA/AO: 1
BH CV ECHO MEAS - LAT PEAK E' VEL: 11 CM/SEC
BH CV ECHO MEAS - LV DIASTOLIC VOL/BSA (35-75): 68.3 ML/M^2
BH CV ECHO MEAS - LV MASS(C)D: 242.7 GRAMS
BH CV ECHO MEAS - LV MASS(C)DI: 103.7 GRAMS/M^2
BH CV ECHO MEAS - LV MAX PG: 3.8 MMHG
BH CV ECHO MEAS - LV MEAN PG: 2 MMHG
BH CV ECHO MEAS - LV SYSTOLIC VOL/BSA (12-30): 22.3 ML/M^2
BH CV ECHO MEAS - LV V1 MAX: 97.8 CM/SEC
BH CV ECHO MEAS - LV V1 MEAN: 72.6 CM/SEC
BH CV ECHO MEAS - LV V1 VTI: 25.2 CM
BH CV ECHO MEAS - LVIDD: 4.7 CM
BH CV ECHO MEAS - LVIDS: 3.2 CM
BH CV ECHO MEAS - LVLD AP4: 10.1 CM
BH CV ECHO MEAS - LVLS AP4: 8.6 CM
BH CV ECHO MEAS - LVOT AREA (M): 4.5 CM^2
BH CV ECHO MEAS - LVOT AREA: 4.5 CM^2
BH CV ECHO MEAS - LVOT DIAM: 2.4 CM
BH CV ECHO MEAS - LVPWD: 1.3 CM
BH CV ECHO MEAS - MED PEAK E' VEL: 7.07 CM/SEC
BH CV ECHO MEAS - MV A MAX VEL: 62.6 CM/SEC
BH CV ECHO MEAS - MV DEC SLOPE: 267 CM/SEC^2
BH CV ECHO MEAS - MV DEC TIME: 0.28 SEC
BH CV ECHO MEAS - MV E MAX VEL: 74.2 CM/SEC
BH CV ECHO MEAS - MV E/A: 1.2
BH CV ECHO MEAS - RAP SYSTOLE: 5 MMHG
BH CV ECHO MEAS - RVSP: 22.5 MMHG
BH CV ECHO MEAS - SI(AO): 462.9 ML/M^2
BH CV ECHO MEAS - SI(CUBED): 29.7 ML/M^2
BH CV ECHO MEAS - SI(LVOT): 48.7 ML/M^2
BH CV ECHO MEAS - SI(MOD-SP4): 46.1 ML/M^2
BH CV ECHO MEAS - SI(TEICH): 25.9 ML/M^2
BH CV ECHO MEAS - SV(AO): 1084 ML
BH CV ECHO MEAS - SV(CUBED): 69.6 ML
BH CV ECHO MEAS - SV(LVOT): 114 ML
BH CV ECHO MEAS - SV(MOD-SP4): 107.9 ML
BH CV ECHO MEAS - SV(TEICH): 60.6 ML
BH CV ECHO MEAS - TR MAX VEL: 209 CM/SEC
BH CV ECHO MEASUREMENTS AVERAGE E/E' RATIO: 8.21
LEFT ATRIUM VOLUME INDEX: 19.9 ML/M2
LEFT ATRIUM VOLUME: 46.6 CM3
MAXIMAL PREDICTED HEART RATE: 161 BPM
STRESS TARGET HR: 137 BPM

## 2021-04-12 ENCOUNTER — OFFICE VISIT (OUTPATIENT)
Dept: CARDIOLOGY | Facility: CLINIC | Age: 61
End: 2021-04-12

## 2021-04-12 VITALS
HEART RATE: 55 BPM | DIASTOLIC BLOOD PRESSURE: 76 MMHG | HEIGHT: 75 IN | BODY MASS INDEX: 27.85 KG/M2 | SYSTOLIC BLOOD PRESSURE: 138 MMHG | WEIGHT: 224 LBS

## 2021-04-12 DIAGNOSIS — I51.7 LVH (LEFT VENTRICULAR HYPERTROPHY): Primary | ICD-10-CM

## 2021-04-12 DIAGNOSIS — Q23.1 AORTIC REGURGITATION DUE TO BICUSPID AORTIC VALVE: ICD-10-CM

## 2021-04-12 DIAGNOSIS — Z82.49 FAMILY HISTORY OF EARLY CAD: ICD-10-CM

## 2021-04-12 DIAGNOSIS — I35.1 NONRHEUMATIC AORTIC VALVE INSUFFICIENCY: ICD-10-CM

## 2021-04-12 DIAGNOSIS — E78.2 MIXED HYPERLIPIDEMIA: ICD-10-CM

## 2021-04-12 PROCEDURE — 93000 ELECTROCARDIOGRAM COMPLETE: CPT | Performed by: INTERNAL MEDICINE

## 2021-04-12 PROCEDURE — 99214 OFFICE O/P EST MOD 30 MIN: CPT | Performed by: INTERNAL MEDICINE

## 2021-04-12 RX ORDER — LOSARTAN POTASSIUM 25 MG/1
25 TABLET ORAL DAILY
Qty: 90 TABLET | Refills: 3 | Status: SHIPPED | OUTPATIENT
Start: 2021-04-12 | End: 2022-04-05

## 2021-04-12 NOTE — PROGRESS NOTES
Melecio Magallon  [id number]  1960  60 y.o.  male    Referring Provider: Roberto Doss MD    Reason for  Visit:    short term office follow up after recent encounter   Initial visit to establish care with myself for ongoing longitudinal care related to cardiovascular issues   Bicuspid aortic valve with moderate aortic insufficiency  Was seen in [facility]   Cardiac workup test results as below: echo and calcium score     Subjective      Overall feels the same   No new events or complaints since last visit   Overall the patient feels no major change from baseline symptoms   Similar symptoms as during last visit   Not checking blood pressures at home       Overall feeling well   No chest pain or shortness of breath     No palpitations  No significant pedal edema    Compliant with medications and dietary advice  Good effort tolerance    No presyncope or syncope  Compliant with medications    Tolerating current medications well with no untoward side effects   Compliant with prescribed medication regimen. Tries to adhere to cardiac diet.        History of present illness:  Melecio Magallon is a 60 y.o. yo male with history of bicuspid aortic valve moderate aortic insufficiency  who presents today for   Chief Complaint   Patient presents with   • Aortic Regurgitation     6 month follow up recent - ECHO    .    History  Past Medical History:   Diagnosis Date   • Aortic regurgitation    • Glaucoma    • Hyperlipidemia    ,   History reviewed. No pertinent surgical history.,   History reviewed. No pertinent family history.,   Social History     Tobacco Use   • Smoking status: Never Smoker   • Smokeless tobacco: Never Used   Substance Use Topics   • Alcohol use: Yes   • Drug use: Never   ,     Medications  Current Outpatient Medications   Medication Sig Dispense Refill   • simvastatin (ZOCOR) 40 MG tablet Take 1 tablet by mouth Every Night. 90 tablet 4   • latanoprost (XALATAN) 0.005 % ophthalmic solution 1  "drop Every Night.     • losartan (Cozaar) 25 MG tablet Take 1 tablet by mouth Daily. 90 tablet 3     No current facility-administered medications for this visit.       Allergies:  Patient has no known allergies.    Review of Systems  Review of Systems   Constitutional: Negative.   HENT: Negative.    Eyes: Negative.    Cardiovascular: Negative for chest pain, claudication, cyanosis, dyspnea on exertion, irregular heartbeat, leg swelling, near-syncope, orthopnea, palpitations, paroxysmal nocturnal dyspnea and syncope.   Respiratory: Negative.    Endocrine: Negative.    Hematologic/Lymphatic: Negative.    Skin: Negative.    Gastrointestinal: Negative for anorexia.   Genitourinary: Negative.    Neurological: Negative.    Psychiatric/Behavioral: Negative.        Objective     Physical Exam:  /76   Pulse 55   Ht 190.5 cm (75\")   Wt 102 kg (224 lb)   BMI 28.00 kg/m²     Physical Exam  Constitutional:       Appearance: He is well-developed.   HENT:      Head: Normocephalic.   Neck:      Vascular: Normal carotid pulses. No carotid bruit or JVD.      Trachea: No tracheal tenderness or tracheal deviation.   Cardiovascular:      Rate and Rhythm: Regular rhythm.      Pulses: Normal pulses.      Heart sounds: Murmur heard.   Systolic murmur is present with a grade of 2/6.   Diastolic murmur is present with a grade of 2/4.     Pulmonary:      Effort: Pulmonary effort is normal.      Breath sounds: No stridor.   Abdominal:      General: There is no distension.      Palpations: Abdomen is soft.      Tenderness: There is no abdominal tenderness.   Musculoskeletal:      Cervical back: No edema.   Skin:     General: Skin is warm.   Neurological:      Mental Status: He is alert.      Cranial Nerves: No cranial nerve deficit.      Sensory: No sensory deficit.   Psychiatric:         Speech: Speech normal.         Behavior: Behavior normal.         Results Review:      Results for orders placed during the hospital encounter of " 04/08/21    Adult Transthoracic Echo Complete W/ Cont if Necessary Per Protocol    Interpretation Summary  · Left ventricular wall thickness is consistent with mild concentric hypertrophy.  · Left ventricular ejection fraction appears to be 61 - 65%. Left ventricular systolic function is normal.  · Left ventricular diastolic function was normal.  · Normal global longitudinal LV strain (GLS) = -20%  · Moderate aortic valve stenosis is present.  · Moderate aortic valve regurgitation is present.  · Estimated right ventricular systolic pressure from tricuspid regurgitation is normal (<35 mmHg).     CS Images     Radiology Images   Study Result    Narrative & Impression   High-resolution computed tomography imaging of the chest was performed  Particular attention paid to coronary arteries.  Images from the examination were analyzed for the presence and extent of coronary artery calcification using coronary calcification quantification software.  Patient tolerated the procedure well and there were no complications.  The results of coronary calcification analysis are as below.  The patient scores compared with published data relating to scores for people of similar age and the same gender.     Left main: 0  Left anterior descending coronary artery: 12.4  Left circumflex: 2.1  Right coronary artery: 7.9  Total calcium score: 22.4     This represents a score that is at the 50th percentile for same age, gender and race/ethnicity.  Overall risk of cardiovascular events is considered to be LOW              ____________________________________________________________________________________________________________________________________________  Health maintenance and recommendations    Low salt/ HTN/ Heart healthy carbohydrate restricted cardiac diet   The patient is advised to reduce or avoid caffeine or other cardiac stimulants.   Minimize or avoid  NSAID-type medications      Monitor for any signs of bleeding including red  or dark stools. Fall precautions.   Advised staying uptodate with immunizations per established standard guidelines.    Offered to give patient  a copy of my notes     Questions were encouraged, asked and answered to the patient's  understanding and satisfaction. Questions if any regarding current medications and side effects, need for refills and importance of compliance to medications stressed.    Reviewed available prior notes, consults, prior visits, laboratory findings, radiology and cardiology relevant reports. Updated chart as applicable. I have reviewed the patient's medical history in detail and updated the computerized patient record as relevant.      Updated patient regarding any new or relevant abnormalities on review of records or any new findings on physical exam. Mentioned to patient about purpose of visit and desirable health short and long term goals and objectives.    Primary to monitor CBC CMP Lipid panel and TSH as applicable    ___________________________________________________________________________________________________________________________________________         ECG 12 Lead    Date/Time: 4/12/2021 8:37 AM  Performed by: Raul Monteiro MD  Authorized by: Raul Monteiro MD   Comparison: compared with previous ECG from 10/29/2020  Comparison to previous ECG: Ventricular rate decreased from 64  to 55  beats per minute    Rhythm: sinus bradycardia  Rate: bradycardic  Conduction: conduction normal  ST Segments: ST segments normal  QRS axis: normal  Other findings: early repolarization    Clinical impression: abnormal EKG            Assessment/Plan   Diagnoses and all orders for this visit:    1. LVH (left ventricular hypertrophy) (Primary)    2. Mixed hyperlipidemia    3. Nonrheumatic aortic valve insufficiency    4. Aortic regurgitation due to bicuspid aortic valve    5. Family history of early CAD    Other orders  -     ECG 12 Lead  -     losartan (Cozaar) 25 MG tablet; Take 1 tablet by  mouth Daily.  Dispense: 90 tablet; Refill: 3        Plan    Patient expressed understanding  Encouraged and answered all questions   Discussed with the patient and all questioned fully answered. He will call me if any problems arise.   Discussed results of prior testing with patient : echo, coronary calcium score   as well electrocardiogram from today        Start low dose angiotensin receptor blocker   Requested Prescriptions     Signed Prescriptions Disp Refills   • losartan (Cozaar) 25 MG tablet 90 tablet 3     Sig: Take 1 tablet by mouth Daily.         Start ECASA 81 mg daily regimen given risk factors and monitor for any signs of bleeding including red or dark stools. Fall precautions.    Call for results of calcium score to direct therapy including Aspirin and statin     Keep LDL below 70 mg/dl. Monitor liver and renal functions.   Monitor CBC, CMP, TSH (as indicated) and Lipid Panel by primary     Buy BP machine and monitor BP    Check BP and heart rates twice daily at least 3x / week at home and bring a recording for me to review next visit  If BP >130/85 or < 100/60 persistently over 3 reading 30 mins apart call sooner      Absolute aortic root size is enlarged but indexed is OK   Monitor AS and AI by serial echo   Repeat echo in 1 year    I support the patient's decision to take the Covid -19 vaccine   Had 1 dose   No major issues      Follow up with ANIBAL Moore to address interim issues           Return in about 6 months (around 10/12/2021).

## 2021-09-15 ENCOUNTER — TELEPHONE (OUTPATIENT)
Dept: GASTROENTEROLOGY | Facility: CLINIC | Age: 61
End: 2021-09-15

## 2021-09-15 NOTE — TELEPHONE ENCOUNTER
PATIENT IS DUE FOR A REPEAT COLONOSCOPY. LEFT VM TO HAVE PT CALL TO SCHEDULE AN OV TO DISCUSS THIS.    LETTER SENT TO PCP.

## 2021-09-16 ENCOUNTER — TELEPHONE (OUTPATIENT)
Dept: GASTROENTEROLOGY | Facility: CLINIC | Age: 61
End: 2021-09-16

## 2021-09-16 NOTE — TELEPHONE ENCOUNTER
Pt had moved to [place].  Has moved back to the area  Had colon done in 2018-states needs the next one done in 5 years-will fax the last scope

## 2021-10-12 ENCOUNTER — OFFICE VISIT (OUTPATIENT)
Dept: CARDIOLOGY | Facility: CLINIC | Age: 61
End: 2021-10-12

## 2021-10-12 VITALS
SYSTOLIC BLOOD PRESSURE: 92 MMHG | HEIGHT: 75 IN | OXYGEN SATURATION: 98 % | WEIGHT: 219 LBS | BODY MASS INDEX: 27.23 KG/M2 | HEART RATE: 66 BPM | DIASTOLIC BLOOD PRESSURE: 54 MMHG

## 2021-10-12 DIAGNOSIS — Q23.1 AORTIC REGURGITATION DUE TO BICUSPID AORTIC VALVE: ICD-10-CM

## 2021-10-12 DIAGNOSIS — I51.7 LVH (LEFT VENTRICULAR HYPERTROPHY): ICD-10-CM

## 2021-10-12 DIAGNOSIS — I10 ESSENTIAL HYPERTENSION: ICD-10-CM

## 2021-10-12 DIAGNOSIS — E66.3 OVERWEIGHT WITH BODY MASS INDEX (BMI) OF 27 TO 27.9 IN ADULT: ICD-10-CM

## 2021-10-12 DIAGNOSIS — I35.1 NONRHEUMATIC AORTIC VALVE INSUFFICIENCY: Primary | ICD-10-CM

## 2021-10-12 DIAGNOSIS — E78.2 MIXED HYPERLIPIDEMIA: ICD-10-CM

## 2021-10-12 PROCEDURE — 93000 ELECTROCARDIOGRAM COMPLETE: CPT | Performed by: NURSE PRACTITIONER

## 2021-10-12 PROCEDURE — 99214 OFFICE O/P EST MOD 30 MIN: CPT | Performed by: NURSE PRACTITIONER

## 2021-10-12 RX ORDER — ASPIRIN 81 MG/1
81 TABLET ORAL DAILY
COMMUNITY

## 2021-10-12 NOTE — PROGRESS NOTES
Subjective:     Encounter Date:10/12/2021      Patient ID: Melecio Magallon is a 61 y.o. male with aortic valve insufficiency, moderate aortic regurgitations due to bicuspid aortic valve, hypertension, hyperlipidemia and left ventricular hypertrophy.    Chief Complaint: 6 month follow up  Hypertension  This is a chronic problem. The current episode started more than 1 year ago. The problem is controlled. Pertinent negatives include no chest pain, malaise/fatigue, orthopnea, palpitations, peripheral edema, PND or shortness of breath. Risk factors for coronary artery disease include dyslipidemia and male gender. Past treatments include angiotensin blockers. Current antihypertension treatment includes angiotensin blockers. There are no compliance problems.  Hypertensive end-organ damage includes left ventricular hypertrophy.     Patient presents today for management of aortic regurgitation. Patient reports that he has been doing well since last office visit. He denies any chest pain. Patient reports that he has been active and denies any worsening fatigue or decrease in stamina. He reports that he doesn't monitor his blood pressure routinely but that at office visits it is well controlled. He denies any heart racing or palpitations. Patient denies any leg swelling, dyspnea on exertion, leg swelling, orthopnea or PND. Patient denies any dizziness, lightheadedness or near syncope. Patient follows with Dr Doss as PCP.     The following portions of the patient's history were reviewed and updated as appropriate: allergies, current medications, past family history, past medical history, past social history, past surgical history and problem list.    No Known Allergies    Current Outpatient Medications:   •  aspirin 81 MG EC tablet, Take 81 mg by mouth Daily., Disp: , Rfl:   •  bimatoprost (LUMIGAN) 0.01 % ophthalmic drops, , Disp: , Rfl:   •  losartan (Cozaar) 25 MG tablet, Take 1 tablet by mouth Daily., Disp: 90  tablet, Rfl: 3  •  simvastatin (ZOCOR) 40 MG tablet, Take 1 tablet by mouth Every Night., Disp: 90 tablet, Rfl: 4  Past Medical History:   Diagnosis Date   • Aortic regurgitation    • Glaucoma    • Hyperlipidemia      Social History     Socioeconomic History   • Marital status:    Tobacco Use   • Smoking status: Never Smoker   • Smokeless tobacco: Never Used   Vaping Use   • Vaping Use: Never used   Substance and Sexual Activity   • Alcohol use: Yes   • Drug use: Never   • Sexual activity: Defer       Review of Systems   Constitutional: Negative for malaise/fatigue.   Cardiovascular: Negative for chest pain, dyspnea on exertion, irregular heartbeat, leg swelling, orthopnea, palpitations and paroxysmal nocturnal dyspnea.   Respiratory: Negative for shortness of breath.    Neurological: Negative for dizziness.   All other systems reviewed and are negative.         Objective:     Vitals reviewed.   Constitutional:       General: Not in acute distress.     Appearance: Normal appearance. Well-developed.   Eyes:      Pupils: Pupils are equal, round, and reactive to light.   HENT:      Head: Normocephalic and atraumatic.      Nose: Nose normal.   Neck:      Vascular: No carotid bruit.   Pulmonary:      Effort: Pulmonary effort is normal. No respiratory distress.      Breath sounds: Normal breath sounds. No wheezing. No rales.   Cardiovascular:      Normal rate. Regular rhythm.      Murmurs: There is a grade 2/6 systolic murmur.   Edema:     Peripheral edema absent.   Abdominal:      General: There is no distension.      Palpations: Abdomen is soft.   Musculoskeletal: Normal range of motion.      Cervical back: Normal range of motion and neck supple. Skin:     General: Skin is warm.      Findings: No erythema or rash.   Neurological:      General: No focal deficit present.      Mental Status: Alert and oriented to person, place, and time.   Psychiatric:         Attention and Perception: Attention normal.         Mood  "and Affect: Mood normal.         Speech: Speech normal.         Behavior: Behavior normal.         Thought Content: Thought content normal.         Judgment: Judgment normal.         BP 92/54   Pulse 66   Ht 190.5 cm (75\")   Wt 99.3 kg (219 lb)   SpO2 98%   BMI 27.37 kg/m²       ECG 12 Lead    Date/Time: 10/12/2021 1:29 PM  Performed by: Fantasma Lin APRN  Authorized by: Fantasma Lin APRN   Comparison: compared with previous ECG from 4/12/2021  Similar to previous ECG  Rhythm: sinus rhythm  Rate: normal  BPM: 66              Lab Review:     Echo 4/2021:  Interpretation Summary     · Left ventricular wall thickness is consistent with mild concentric hypertrophy.  · Left ventricular ejection fraction appears to be 61 - 65%. Left ventricular systolic function is normal.  · Left ventricular diastolic function was normal.  · Normal global longitudinal LV strain (GLS) = -20%  · Moderate aortic valve stenosis is present.  · Moderate aortic valve regurgitation is present.  · Estimated right ventricular systolic pressure from tricuspid regurgitation is normal (<35 mmHg).    CTA coronary 11/11/2020:    High-resolution computed tomography imaging of the chest was performed  Particular attention paid to coronary arteries.  Images from the examination were analyzed for the presence and extent of coronary artery calcification using coronary calcification quantification software.  Patient tolerated the procedure well and there were no complications.  The results of coronary calcification analysis are as below.  The patient scores compared with published data relating to scores for people of similar age and the same gender.     Left main: 0  Left anterior descending coronary artery: 12.4  Left circumflex: 2.1  Right coronary artery: 7.9  Total calcium score: 22.4     This represents a score that is at the 50th percentile for same age, gender and race/ethnicity.  Overall risk of cardiovascular events is considered to be " LOW        I have personally reviewed Echo, CTA coronaries and past office notes prior to patients visit  Assessment:          Diagnosis Plan   1. Nonrheumatic aortic valve insufficiency  Adult Transthoracic Echo Complete w/ Color, Spectral and Contrast if necessary per protocol   2. Aortic regurgitation due to bicuspid aortic valve  Adult Transthoracic Echo Complete w/ Color, Spectral and Contrast if necessary per protocol   3. Essential hypertension     4. Mixed hyperlipidemia     5. LVH (left ventricular hypertrophy)     6. Overweight with body mass index (BMI) of 27 to 27.9 in adult            Plan:       1. Aortic valve insuffiencey:  Echo 4/2021 moderate. Repeat echo ordered for 4/2022.      2. Aortic regurgitation due to bicuspid aortic valve: Echo 4/2021 moderate. Repeat echo ordered for 4/2022.      3. Hypertension: Controlled in office. Continue current medications    4. Hyperlipidemia: Followed and managed by PCP. Will obtain most recent lipids.     5.LVH    6. Overweight: Patient's Body mass index is 27.37 kg/m². indicating that he is overweight (BMI 25-29.9). Obesity-related health conditions include the following: hypertension and dyslipidemias. Obesity is unchanged. BMI is is above average; BMI management plan is completed. We discussed portion control and increasing exercise..    I spent 33 minutes caring for Melecio on this date of service. This time includes time spent by me in the following activities: preparing for the visit, reviewing tests, obtaining and/or reviewing a separately obtained history, performing a medically appropriate examination and/or evaluation, ordering medications, tests, or procedures and documenting information in the medical record    I spent 2 minutes on the separately reported service of EKG. This time is not included in the time used to support the E/M service also reported today.    Patient is to follow up in 6 months or sooner if needed

## 2021-11-01 ENCOUNTER — TELEPHONE (OUTPATIENT)
Dept: CARDIOLOGY | Facility: CLINIC | Age: 61
End: 2021-11-01

## 2021-11-01 NOTE — TELEPHONE ENCOUNTER
----- Message from ANIBAL Martin sent at 10/21/2021 12:43 PM CDT -----  Please call and inform patient that his LDL was 88; goal is <100 so he is controlled. Thanks  ----- Message -----  From: Asmita Darnell CMA  Sent: 10/12/2021   2:34 PM CDT  To: ANIBAL Martin    They are from Dec 2020 but they are on the way.   ----- Message -----  From: Fantasma Lin APRN  Sent: 10/12/2021   2:08 PM CDT  To: Asmita Darnell CMA    Lipid panel from PCP please

## 2021-11-23 ENCOUNTER — APPOINTMENT (OUTPATIENT)
Dept: CARDIOLOGY | Facility: HOSPITAL | Age: 61
End: 2021-11-23

## 2021-11-30 ENCOUNTER — TELEPHONE (OUTPATIENT)
Dept: UROLOGY | Facility: CLINIC | Age: 61
End: 2021-11-30

## 2021-12-01 ENCOUNTER — HOSPITAL ENCOUNTER (OUTPATIENT)
Dept: GENERAL RADIOLOGY | Facility: HOSPITAL | Age: 61
Discharge: HOME | End: 2021-12-01
Admitting: UROLOGY

## 2021-12-01 ENCOUNTER — OFFICE VISIT (OUTPATIENT)
Dept: UROLOGY | Facility: CLINIC | Age: 61
End: 2021-12-01

## 2021-12-01 VITALS — TEMPERATURE: 98 F | HEIGHT: 75 IN | BODY MASS INDEX: 27.95 KG/M2 | WEIGHT: 224.8 LBS

## 2021-12-01 DIAGNOSIS — N20.0 KIDNEY STONE: Primary | ICD-10-CM

## 2021-12-01 DIAGNOSIS — N20.0 KIDNEY STONE: ICD-10-CM

## 2021-12-01 LAB
BILIRUB BLD-MCNC: NEGATIVE MG/DL
CLARITY, POC: CLEAR
COLOR UR: YELLOW
GLUCOSE UR STRIP-MCNC: ABNORMAL MG/DL
KETONES UR QL: NEGATIVE
LEUKOCYTE EST, POC: NEGATIVE
NITRITE UR-MCNC: NEGATIVE MG/ML
PH UR: 5 [PH] (ref 5–8)
PROT UR STRIP-MCNC: NEGATIVE MG/DL
RBC # UR STRIP: NEGATIVE /UL
SP GR UR: 1.01 (ref 1–1.03)
UROBILINOGEN UR QL: NORMAL

## 2021-12-01 PROCEDURE — 99213 OFFICE O/P EST LOW 20 MIN: CPT | Performed by: UROLOGY

## 2021-12-01 PROCEDURE — 74018 RADEX ABDOMEN 1 VIEW: CPT

## 2021-12-01 PROCEDURE — 81001 URINALYSIS AUTO W/SCOPE: CPT | Performed by: UROLOGY

## 2021-12-01 NOTE — PATIENT INSTRUCTIONS
"BMI for Adults  What is BMI?  Body mass index (BMI) is a number that is calculated from a person's weight and height. BMI can help estimate how much of a person's weight is composed of fat. BMI does not measure body fat directly. Rather, it is an alternative to procedures that directly measure body fat, which can be difficult and expensive.  BMI can help identify people who may be at higher risk for certain medical problems.  What are BMI measurements used for?  BMI is used as a screening tool to identify possible weight problems. It helps determine whether a person is obese, overweight, a healthy weight, or underweight.  BMI is useful for:  · Identifying a weight problem that may be related to a medical condition or may increase the risk for medical problems.  · Promoting changes, such as changes in diet and exercise, to help reach a healthy weight. BMI screening can be repeated to see if these changes are working.  How is BMI calculated?  BMI involves measuring your weight in relation to your height. Both height and weight are measured, and the BMI is calculated from those numbers. This can be done either in English (U[place] or metric measurements. Note that charts and online BMI calculators are available to help you find your BMI quickly and easily without having to do these calculations yourself.  To calculate your BMI in English (U.S[place] measurements:    1. Measure your weight in pounds (lb).  2. Multiply the number of pounds by 703.  ? For example, for a person who weighs 180 lb, multiply that number by 703, which equals 126,540.  3. Measure your height in inches. Then multiply that number by itself to get a measurement called \"inches squared.\"  ? For example, for a person who is 70 inches tall, the \"inches squared\" measurement is 70 inches x 70 inches, which equals 4,900 inches squared.  4. Divide the total from step 2 (number of lb x 703) by the total from step 3 (inches squared): 126,540 ÷ 4,900 = 25.8. This is " "your BMI.    To calculate your BMI in metric measurements:  1. Measure your weight in kilograms (kg).  2. Measure your height in meters (m). Then multiply that number by itself to get a measurement called \"meters squared.\"  ? For example, for a person who is 1.75 m tall, the \"meters squared\" measurement is 1.75 m x 1.75 m, which is equal to 3.1 meters squared.  3. Divide the number of kilograms (your weight) by the meters squared number. In this example: 70 ÷ 3.1 = 22.6. This is your BMI.  What do the results mean?  BMI charts are used to identify whether you are underweight, normal weight, overweight, or obese. The following guidelines will be used:  · Underweight: BMI less than 18.5.  · Normal weight: BMI between 18.5 and 24.9.  · Overweight: BMI between 25 and 29.9.  · Obese: BMI of 30 or above.  Keep these notes in mind:  · Weight includes both fat and muscle, so someone with a muscular build, such as an athlete, may have a BMI that is higher than 24.9. In cases like these, BMI is not an accurate measure of body fat.  · To determine if excess body fat is the cause of a BMI of 25 or higher, further assessments may need to be done by a health care provider.  · BMI is usually interpreted in the same way for men and women.  Where to find more information  For more information about BMI, including tools to quickly calculate your BMI, go to these websites:  · Centers for Disease Control and Prevention: www.cdc.gov  · American Heart Association: www.heart.org  · National Heart, Lung, and Blood [employer]: www.nhlbi.nih.gov  Summary  · Body mass index (BMI) is a number that is calculated from a person's weight and height.  · BMI may help estimate how much of a person's weight is composed of fat. BMI can help identify those who may be at higher risk for certain medical problems.  · BMI can be measured using English measurements or metric measurements.  · BMI charts are used to identify whether you are underweight, normal " weight, overweight, or obese.  This information is not intended to replace advice given to you by your health care provider. Make sure you discuss any questions you have with your health care provider.  Document Revised: 09/09/2020 Document Reviewed: 07/17/2020  Elsevier Patient Education © 2021 [employer]

## 2022-04-05 ENCOUNTER — HOSPITAL ENCOUNTER (OUTPATIENT)
Dept: CARDIOLOGY | Facility: HOSPITAL | Age: 62
Discharge: HOME | End: 2022-04-05
Admitting: NURSE PRACTITIONER

## 2022-04-05 VITALS
SYSTOLIC BLOOD PRESSURE: 125 MMHG | WEIGHT: 224 LBS | DIASTOLIC BLOOD PRESSURE: 75 MMHG | HEIGHT: 75 IN | BODY MASS INDEX: 27.85 KG/M2

## 2022-04-05 DIAGNOSIS — Q23.1 AORTIC REGURGITATION DUE TO BICUSPID AORTIC VALVE: ICD-10-CM

## 2022-04-05 DIAGNOSIS — I35.1 NONRHEUMATIC AORTIC VALVE INSUFFICIENCY: ICD-10-CM

## 2022-04-05 PROCEDURE — 93306 TTE W/DOPPLER COMPLETE: CPT

## 2022-04-05 PROCEDURE — 93306 TTE W/DOPPLER COMPLETE: CPT | Performed by: INTERNAL MEDICINE

## 2022-04-05 PROCEDURE — 93356 MYOCRD STRAIN IMG SPCKL TRCK: CPT

## 2022-04-05 PROCEDURE — 93356 MYOCRD STRAIN IMG SPCKL TRCK: CPT | Performed by: INTERNAL MEDICINE

## 2022-04-05 RX ORDER — LOSARTAN POTASSIUM 25 MG/1
TABLET ORAL
Qty: 90 TABLET | Refills: 3 | Status: SHIPPED | OUTPATIENT
Start: 2022-04-05 | End: 2023-03-29

## 2022-04-08 LAB
BH CV ECHO LEFT VENTRICLE GLOBAL LONGITUDINAL STRAIN: -15 %
BH CV ECHO MEAS - AO MAX PG (FULL): 60.5 MMHG
BH CV ECHO MEAS - AO MAX PG: 64.3 MMHG
BH CV ECHO MEAS - AO MEAN PG (FULL): 31.3 MMHG
BH CV ECHO MEAS - AO MEAN PG: 33.3 MMHG
BH CV ECHO MEAS - AO ROOT AREA (BSA CORRECTED): 1.7
BH CV ECHO MEAS - AO ROOT AREA: 11.9 CM^2
BH CV ECHO MEAS - AO ROOT DIAM: 3.9 CM
BH CV ECHO MEAS - AO V2 MAX: 401 CM/SEC
BH CV ECHO MEAS - AO V2 MEAN: 266 CM/SEC
BH CV ECHO MEAS - AO V2 VTI: 93.3 CM
BH CV ECHO MEAS - AVA(I,A): 1.3 CM^2
BH CV ECHO MEAS - AVA(I,D): 1.3 CM^2
BH CV ECHO MEAS - AVA(V,A): 1.1 CM^2
BH CV ECHO MEAS - AVA(V,D): 1.1 CM^2
BH CV ECHO MEAS - BSA(HAYCOCK): 2.3 M^2
BH CV ECHO MEAS - BSA: 2.3 M^2
BH CV ECHO MEAS - BZI_BMI: 28 KILOGRAMS/M^2
BH CV ECHO MEAS - BZI_METRIC_HEIGHT: 190.5 CM
BH CV ECHO MEAS - BZI_METRIC_WEIGHT: 101.6 KG
BH CV ECHO MEAS - EDV(CUBED): 189.1 ML
BH CV ECHO MEAS - EDV(MOD-SP4): 127 ML
BH CV ECHO MEAS - EDV(TEICH): 162.6 ML
BH CV ECHO MEAS - EF(CUBED): 73.2 %
BH CV ECHO MEAS - EF(MOD-SP4): 65.3 %
BH CV ECHO MEAS - EF(TEICH): 64.3 %
BH CV ECHO MEAS - ESV(CUBED): 50.7 ML
BH CV ECHO MEAS - ESV(MOD-SP4): 44.1 ML
BH CV ECHO MEAS - ESV(TEICH): 58.1 ML
BH CV ECHO MEAS - FS: 35.5 %
BH CV ECHO MEAS - IVS/LVPW: 0.91
BH CV ECHO MEAS - IVSD: 0.83 CM
BH CV ECHO MEAS - LA DIMENSION: 3.9 CM
BH CV ECHO MEAS - LA/AO: 1
BH CV ECHO MEAS - LAT PEAK E' VEL: 12.3 CM/SEC
BH CV ECHO MEAS - LV DIASTOLIC VOL/BSA (35-75): 55.2 ML/M^2
BH CV ECHO MEAS - LV MASS(C)D: 193.3 GRAMS
BH CV ECHO MEAS - LV MASS(C)DI: 83.9 GRAMS/M^2
BH CV ECHO MEAS - LV MAX PG: 3.9 MMHG
BH CV ECHO MEAS - LV MEAN PG: 2 MMHG
BH CV ECHO MEAS - LV SYSTOLIC VOL/BSA (12-30): 19.2 ML/M^2
BH CV ECHO MEAS - LV V1 MAX: 98.3 CM/SEC
BH CV ECHO MEAS - LV V1 MEAN: 68.1 CM/SEC
BH CV ECHO MEAS - LV V1 VTI: 26.4 CM
BH CV ECHO MEAS - LVIDD: 5.7 CM
BH CV ECHO MEAS - LVIDS: 3.7 CM
BH CV ECHO MEAS - LVLD AP4: 9.2 CM
BH CV ECHO MEAS - LVLS AP4: 7.4 CM
BH CV ECHO MEAS - LVOT AREA (M): 4.5 CM^2
BH CV ECHO MEAS - LVOT AREA: 4.5 CM^2
BH CV ECHO MEAS - LVOT DIAM: 2.4 CM
BH CV ECHO MEAS - LVPWD: 0.92 CM
BH CV ECHO MEAS - MED PEAK E' VEL: 7.29 CM/SEC
BH CV ECHO MEAS - MV A MAX VEL: 79.5 CM/SEC
BH CV ECHO MEAS - MV DEC TIME: 0.28 SEC
BH CV ECHO MEAS - MV E MAX VEL: 92.6 CM/SEC
BH CV ECHO MEAS - MV E/A: 1.2
BH CV ECHO MEAS - RAP SYSTOLE: 5 MMHG
BH CV ECHO MEAS - RVSP: 22.8 MMHG
BH CV ECHO MEAS - SI(AO): 483.9 ML/M^2
BH CV ECHO MEAS - SI(CUBED): 60.1 ML/M^2
BH CV ECHO MEAS - SI(LVOT): 51.9 ML/M^2
BH CV ECHO MEAS - SI(MOD-SP4): 36 ML/M^2
BH CV ECHO MEAS - SI(TEICH): 45.4 ML/M^2
BH CV ECHO MEAS - SV(AO): 1114 ML
BH CV ECHO MEAS - SV(CUBED): 138.5 ML
BH CV ECHO MEAS - SV(LVOT): 119.4 ML
BH CV ECHO MEAS - SV(MOD-SP4): 82.9 ML
BH CV ECHO MEAS - SV(TEICH): 104.5 ML
BH CV ECHO MEAS - TR MAX VEL: 211 CM/SEC
BH CV ECHO MEASUREMENTS AVERAGE E/E' RATIO: 9.45
LEFT ATRIUM VOLUME INDEX: 31.8 ML/M2
LEFT ATRIUM VOLUME: 73.2 CM3
MAXIMAL PREDICTED HEART RATE: 160 BPM
STRESS TARGET HR: 136 BPM

## 2022-04-13 PROBLEM — I35.0 NONRHEUMATIC AORTIC VALVE STENOSIS: Status: ACTIVE | Noted: 2022-04-13

## 2022-04-13 PROBLEM — I10 PRIMARY HYPERTENSION: Status: ACTIVE | Noted: 2022-04-13

## 2022-04-13 NOTE — PROGRESS NOTES
"Chief Complaint  Cardiac Valve Problem (6mo F/U. ) and Hypertension    Subjective          Melceio Magallon presents to [facility] for routine follow-up of outpatient testing.  2D echo on 4/5/2022 revealed normal left ventricular systolic function ejection fraction 61 to 65%, sigmoid shaped ventricular septum, normal left ventricular diastolic function, abnormal global longitudinal LV strain of -15% and moderate to severe aortic valve stenosis.  He has bicuspid aortic valve, aortic valve stenosis, aortic valve insufficiency, hypertension, hyperlipidemia and left ventricular hypertrophy.  Patient denies chest pain, shortness of breath, palpitations, dizziness, syncope, orthopnea, PND, edema or decreased stamina.  Patient denies any signs of bleeding.    Hypertension  This is a chronic problem. The current episode started more than 1 year ago. The problem is uncontrolled. Pertinent negatives include no anxiety, blurred vision, chest pain, headaches, malaise/fatigue, neck pain, orthopnea, palpitations, peripheral edema, PND, shortness of breath or sweats. Risk factors for coronary artery disease include male gender and dyslipidemia. Current antihypertension treatment includes angiotensin blockers. The current treatment provides moderate improvement. Hypertensive end-organ damage includes left ventricular hypertrophy.   Hyperlipidemia  This is a chronic problem. The current episode started more than 1 year ago. Pertinent negatives include no chest pain or shortness of breath. Current antihyperlipidemic treatment includes statins. Risk factors for coronary artery disease include male sex, hypertension and dyslipidemia.       Objective   Vital Signs:   /86   Pulse 60   Ht 190.5 cm (75\")   Wt 97.5 kg (215 lb)   SpO2 98%   BMI 26.87 kg/m²     Vitals and nursing note reviewed.   Constitutional:       General: Awake.      Appearance: Normal and healthy appearance. Well-developed, " overweight and not in distress.   Eyes:      General: Lids are normal.      Conjunctiva/sclera: Conjunctivae normal.      Pupils: Pupils are equal, round, and reactive to light.   HENT:      Head: Normocephalic and atraumatic.      Nose: Nose normal.   Neck:      Vascular: No JVR. JVD normal.   Pulmonary:      Effort: Pulmonary effort is normal.      Breath sounds: Normal breath sounds. No wheezing. No rhonchi. No rales.   Chest:      Chest wall: Not tender to palpatation.   Cardiovascular:      PMI at left midclavicular line. Normal rate. Regular rhythm. Normal S1. Normal S2.      Murmurs: There is a grade 2/6 harsh midsystolic murmur at the URSB, radiating to the neck. There is a grade 2/4 high frequency blowing decrescendo, early diastolic murmur at the URSB, radiating to the apex.      No gallop. No click. No rub.   Pulses:     Intact distal pulses.   Edema:     Peripheral edema absent.   Abdominal:      General: Bowel sounds are normal.      Palpations: Abdomen is soft.      Tenderness: There is no abdominal tenderness.   Musculoskeletal: Normal range of motion.         General: No tenderness.      Cervical back: Normal range of motion. Skin:     General: Skin is warm and dry.   Neurological:      General: No focal deficit present.      Mental Status: Alert, oriented to person, place, and time and oriented to person, place and time.   Psychiatric:         Attention and Perception: Attention and perception normal.         Mood and Affect: Mood and affect normal.         Speech: Speech normal.         Behavior: Behavior normal. Behavior is cooperative.         Thought Content: Thought content normal.         Cognition and Memory: Cognition and memory normal.         Judgment: Judgment normal.        Result Review :   The following data was reviewed by: ANIBAL Kerr on 04/14/2022:      Data reviewed: Cardiology studies 2d echo 4/5/22     ECG 12 Lead    Date/Time: 4/14/2022 11:21 AM  Performed by:  Alysia Fitzpatrick, APRN  Authorized by: Alysia Fitzpatrick, APRN   Comparison: compared with previous ECG from 10/12/2021  Similar to previous ECG  Rhythm: sinus rhythm  Rate: normal  BPM: 60  Q waves: III    QRS axis: normal    Clinical impression: abnormal EKG              Assessment and Plan    Diagnoses and all orders for this visit:    1. Bicuspid aortic valve (Primary)-mild aortic valve insufficiency and moderate to severe aortic valve stenosis on most recent 2D echo 4/5/2022.  Stable.    2. Nonrheumatic aortic valve stenosis-aortic stenosis has progressed from mild to moderate to moderate to severe on most recent 2D echo 4/5/2022. Asymptomatic at this time. Repeat 2d echo in six months for surveillance of valvular heart disease.     3. Nonrheumatic aortic valve insufficiency-mild on most recent 2D echo 4/5/2022.  Stable.    4. LVH (left ventricular hypertrophy)-sigmoid shaped ventricular septum.  Stable.    5. Primary hypertension-blood pressure is elevated in office today. Pt does not monitor routinely at home.  Continue losartan.  Monitor and record daily blood pressure. Report readings consistently higher than 130/90 or consistently lower than 100/60.     6. Mixed hyperlipidemia-management per PCP.  Continue simvastatin.        Follow Up   Return in about 6 months (around 10/14/2022) for Next scheduled follow up with Dr. Monteiro.  Patient was given instructions and counseling regarding his condition or for health maintenance advice. Please see specific information pulled into the AVS if appropriate.

## 2022-04-14 ENCOUNTER — OFFICE VISIT (OUTPATIENT)
Dept: CARDIOLOGY | Facility: CLINIC | Age: 62
End: 2022-04-14

## 2022-04-14 VITALS
HEIGHT: 75 IN | WEIGHT: 215 LBS | BODY MASS INDEX: 26.73 KG/M2 | SYSTOLIC BLOOD PRESSURE: 138 MMHG | OXYGEN SATURATION: 98 % | DIASTOLIC BLOOD PRESSURE: 86 MMHG | HEART RATE: 60 BPM

## 2022-04-14 DIAGNOSIS — I51.7 LVH (LEFT VENTRICULAR HYPERTROPHY): ICD-10-CM

## 2022-04-14 DIAGNOSIS — I35.1 NONRHEUMATIC AORTIC VALVE INSUFFICIENCY: ICD-10-CM

## 2022-04-14 DIAGNOSIS — E78.2 MIXED HYPERLIPIDEMIA: ICD-10-CM

## 2022-04-14 DIAGNOSIS — I35.0 NONRHEUMATIC AORTIC VALVE STENOSIS: ICD-10-CM

## 2022-04-14 DIAGNOSIS — I10 PRIMARY HYPERTENSION: ICD-10-CM

## 2022-04-14 DIAGNOSIS — Q23.1 BICUSPID AORTIC VALVE: Primary | ICD-10-CM

## 2022-04-14 PROCEDURE — 99214 OFFICE O/P EST MOD 30 MIN: CPT | Performed by: NURSE PRACTITIONER

## 2022-04-14 PROCEDURE — 93000 ELECTROCARDIOGRAM COMPLETE: CPT | Performed by: NURSE PRACTITIONER

## 2022-05-31 RX ORDER — SIMVASTATIN 40 MG
TABLET ORAL
Qty: 90 TABLET | Refills: 4 | Status: SHIPPED | OUTPATIENT
Start: 2022-05-31

## 2022-09-13 ENCOUNTER — HOSPITAL ENCOUNTER (OUTPATIENT)
Dept: CARDIOLOGY | Facility: HOSPITAL | Age: 62
Discharge: HOME | End: 2022-09-13
Admitting: NURSE PRACTITIONER

## 2022-09-13 DIAGNOSIS — I35.0 NONRHEUMATIC AORTIC VALVE STENOSIS: ICD-10-CM

## 2022-09-13 DIAGNOSIS — I35.1 NONRHEUMATIC AORTIC VALVE INSUFFICIENCY: ICD-10-CM

## 2022-09-13 DIAGNOSIS — Q23.1 BICUSPID AORTIC VALVE: ICD-10-CM

## 2022-09-13 PROCEDURE — 93306 TTE W/DOPPLER COMPLETE: CPT

## 2022-09-13 PROCEDURE — 93356 MYOCRD STRAIN IMG SPCKL TRCK: CPT | Performed by: INTERNAL MEDICINE

## 2022-09-13 PROCEDURE — 93306 TTE W/DOPPLER COMPLETE: CPT | Performed by: INTERNAL MEDICINE

## 2022-09-13 PROCEDURE — 93356 MYOCRD STRAIN IMG SPCKL TRCK: CPT

## 2022-09-14 ENCOUNTER — APPOINTMENT (OUTPATIENT)
Dept: CARDIOLOGY | Facility: HOSPITAL | Age: 62
End: 2022-09-14

## 2022-09-17 LAB
BH CV ECHO LEFT VENTRICLE GLOBAL LONGITUDINAL STRAIN: -17 %
BH CV ECHO MEAS - ACS: 1.1 CM
BH CV ECHO MEAS - AO MAX PG: 65.3 MMHG
BH CV ECHO MEAS - AO MEAN PG: 30.3 MMHG
BH CV ECHO MEAS - AO ROOT DIAM: 3.9 CM
BH CV ECHO MEAS - AO V2 MAX: 404 CM/SEC
BH CV ECHO MEAS - AO V2 VTI: 82.3 CM
BH CV ECHO MEAS - AVA(I,D): 1.26 CM2
BH CV ECHO MEAS - EDV(CUBED): 110.6 ML
BH CV ECHO MEAS - EDV(MOD-SP2): 168 ML
BH CV ECHO MEAS - EDV(MOD-SP4): 155 ML
BH CV ECHO MEAS - EF(MOD-BP): 68.1 %
BH CV ECHO MEAS - EF(MOD-SP2): 68.3 %
BH CV ECHO MEAS - EF(MOD-SP4): 68.3 %
BH CV ECHO MEAS - ESV(CUBED): 25.4 ML
BH CV ECHO MEAS - ESV(MOD-SP2): 53.3 ML
BH CV ECHO MEAS - ESV(MOD-SP4): 49.1 ML
BH CV ECHO MEAS - FS: 38.8 %
BH CV ECHO MEAS - IVS/LVPW: 1.01 CM
BH CV ECHO MEAS - IVSD: 1.49 CM
BH CV ECHO MEAS - LA DIMENSION: 4.7 CM
BH CV ECHO MEAS - LAT PEAK E' VEL: 10.2 CM/SEC
BH CV ECHO MEAS - LV DIASTOLIC VOL/BSA (35-75): 68.5 CM2
BH CV ECHO MEAS - LV MASS(C)D: 297.4 GRAMS
BH CV ECHO MEAS - LV MAX PG: 3 MMHG
BH CV ECHO MEAS - LV MEAN PG: 2 MMHG
BH CV ECHO MEAS - LV SYSTOLIC VOL/BSA (12-30): 21.7 CM2
BH CV ECHO MEAS - LV V1 MAX: 86.7 CM/SEC
BH CV ECHO MEAS - LV V1 VTI: 23 CM
BH CV ECHO MEAS - LVIDD: 4.8 CM
BH CV ECHO MEAS - LVIDS: 2.9 CM
BH CV ECHO MEAS - LVOT AREA: 4.5 CM2
BH CV ECHO MEAS - LVOT DIAM: 2.4 CM
BH CV ECHO MEAS - LVPWD: 1.47 CM
BH CV ECHO MEAS - MED PEAK E' VEL: 9.4 CM/SEC
BH CV ECHO MEAS - MR MAX PG: 51 MMHG
BH CV ECHO MEAS - MR MAX VEL: 356.5 CM/SEC
BH CV ECHO MEAS - MV A MAX VEL: 78.1 CM/SEC
BH CV ECHO MEAS - MV DEC SLOPE: 263.5 CM/SEC2
BH CV ECHO MEAS - MV DEC TIME: 0.25 MSEC
BH CV ECHO MEAS - MV E MAX VEL: 71.8 CM/SEC
BH CV ECHO MEAS - MV E/A: 0.92
BH CV ECHO MEAS - MV MAX PG: 2.5 MMHG
BH CV ECHO MEAS - MV MEAN PG: 2 MMHG
BH CV ECHO MEAS - MV P1/2T: 115.6 MSEC
BH CV ECHO MEAS - MV V2 VTI: 32.7 CM
BH CV ECHO MEAS - MVA(P1/2T): 1.9 CM2
BH CV ECHO MEAS - MVA(VTI): 3.2 CM2
BH CV ECHO MEAS - RAP SYSTOLE: 10 MMHG
BH CV ECHO MEAS - RVDD: 3.6 CM
BH CV ECHO MEAS - RVSP: 31 MMHG
BH CV ECHO MEAS - SI(MOD-SP2): 50.7 ML/M2
BH CV ECHO MEAS - SI(MOD-SP4): 46.8 ML/M2
BH CV ECHO MEAS - SV(LVOT): 104 ML
BH CV ECHO MEAS - SV(MOD-SP2): 114.7 ML
BH CV ECHO MEAS - SV(MOD-SP4): 105.9 ML
BH CV ECHO MEAS - TAPSE (>1.6): 1.89 CM
BH CV ECHO MEAS - TR MAX PG: 21 MMHG
BH CV ECHO MEAS - TR MAX VEL: 229 CM/SEC
BH CV ECHO MEASUREMENTS AVERAGE E/E' RATIO: 7.33
BH CV XLRA - TDI S': 11 CM/SEC
LEFT ATRIUM VOLUME INDEX: 25.8 ML/M2
LEFT ATRIUM VOLUME: 58.4 ML
MAXIMAL PREDICTED HEART RATE: 159 BPM
STRESS TARGET HR: 135 BPM

## 2022-11-01 ENCOUNTER — OFFICE VISIT (OUTPATIENT)
Dept: CARDIOLOGY | Facility: CLINIC | Age: 62
End: 2022-11-01

## 2022-11-01 VITALS
HEART RATE: 61 BPM | BODY MASS INDEX: 28.1 KG/M2 | SYSTOLIC BLOOD PRESSURE: 142 MMHG | OXYGEN SATURATION: 98 % | HEIGHT: 75 IN | WEIGHT: 226 LBS | DIASTOLIC BLOOD PRESSURE: 80 MMHG

## 2022-11-01 DIAGNOSIS — R09.89 BRUIT OF RIGHT CAROTID ARTERY: ICD-10-CM

## 2022-11-01 DIAGNOSIS — I35.0 NONRHEUMATIC AORTIC VALVE STENOSIS: Primary | ICD-10-CM

## 2022-11-01 DIAGNOSIS — Z82.49 FAMILY HISTORY OF EARLY CAD: ICD-10-CM

## 2022-11-01 DIAGNOSIS — Q23.1 BICUSPID AORTIC VALVE: ICD-10-CM

## 2022-11-01 DIAGNOSIS — I10 PRIMARY HYPERTENSION: ICD-10-CM

## 2022-11-01 DIAGNOSIS — E78.2 MIXED HYPERLIPIDEMIA: ICD-10-CM

## 2022-11-01 DIAGNOSIS — I51.7 LVH (LEFT VENTRICULAR HYPERTROPHY): ICD-10-CM

## 2022-11-01 PROCEDURE — 99214 OFFICE O/P EST MOD 30 MIN: CPT | Performed by: INTERNAL MEDICINE

## 2022-11-01 NOTE — PROGRESS NOTES
Melecio Magallon  [id number]  1960  62 y.o.  male    Referring Provider: Roberto Doss MD    Reason for  Visit:    short term office follow up after recent encounter   Initial visit to establish care with myself for ongoing longitudinal care related to cardiovascular issues   Bicuspid aortic valve with moderate aortic insufficiency  Was seen in [facility]   Cardiac workup test results as below: echo      Subjective    No new events or complaints since last visit   BP well controlled at home.       Overall feeling well   No chest pain or shortness of breath     No palpitations  No significant pedal edema    Compliant with medications and dietary advice  Good effort tolerance    No presyncope or syncope  Compliant with medications    Tolerating current medications well with no untoward side effects   Compliant with prescribed medication regimen. Tries to adhere to cardiac diet.    No bleeding, excessive bruising, gait instability or fall risks          History of present illness:  Melecio Magallon is a 62 y.o. yo male with bicuspid aortic valve moderate to severe aortic stenosis  who presents today for   Chief Complaint   Patient presents with   • Follow-up     6 MO- BICUSPID AORTIC VALVE- ECHO RESULTS   • Hypertension   .    History  Past Medical History:   Diagnosis Date   • Bicuspid aortic valve    • Glaucoma    • LVH (left ventricular hypertrophy) 10/29/2020   • Mixed hyperlipidemia 10/29/2020   • Nonrheumatic aortic valve insufficiency    • Nonrheumatic aortic valve stenosis 4/13/2022   • Primary hypertension 4/13/2022   ,   History reviewed. No pertinent surgical history.,   History reviewed. No pertinent family history.,   Social History     Tobacco Use   • Smoking status: Never   • Smokeless tobacco: Never   Vaping Use   • Vaping Use: Never used   Substance Use Topics   • Alcohol use: Yes   • Drug use: Never   ,     Medications  Current Outpatient Medications   Medication Sig Dispense Refill  "  • aspirin 81 MG EC tablet Take 81 mg by mouth Daily.     • bimatoprost (LUMIGAN) 0.01 % ophthalmic drops      • losartan (COZAAR) 25 MG tablet TAKE 1 TABLET BY MOUTH EVERY DAY 90 tablet 3   • simvastatin (ZOCOR) 40 MG tablet TAKE 1 TABLET BY MOUTH EVERY DAY AT NIGHT 90 tablet 4     No current facility-administered medications for this visit.       Allergies:  Patient has no known allergies.    Review of Systems  Review of Systems   Constitutional: Negative.   HENT: Negative.    Eyes: Negative.    Cardiovascular: Negative for chest pain, claudication, cyanosis, dyspnea on exertion, irregular heartbeat, leg swelling, near-syncope, orthopnea, palpitations, paroxysmal nocturnal dyspnea and syncope.   Respiratory: Negative.    Endocrine: Negative.    Hematologic/Lymphatic: Negative.    Skin: Negative.    Gastrointestinal: Negative for anorexia.   Genitourinary: Negative.    Neurological: Negative.    Psychiatric/Behavioral: Negative.        Objective     Physical Exam:  /80 (BP Location: Left arm, Patient Position: Sitting, Cuff Size: Large Adult)   Pulse 61   Ht 190.5 cm (75\")   Wt 103 kg (226 lb)   SpO2 98%   BMI 28.25 kg/m²     Physical Exam  Constitutional:       Appearance: He is well-developed.   HENT:      Head: Normocephalic.   Neck:      Vascular: Normal carotid pulses. Carotid bruit present. No JVD.      Trachea: No tracheal tenderness or tracheal deviation.   Cardiovascular:      Rate and Rhythm: Regular rhythm.      Pulses: Normal pulses.      Heart sounds: Murmur heard.    Systolic murmur is present with a grade of 2/6.   Diastolic murmur is present with a grade of 2/4.  Pulmonary:      Effort: Pulmonary effort is normal.      Breath sounds: No stridor.   Abdominal:      General: There is no distension.      Palpations: Abdomen is soft.      Tenderness: There is no abdominal tenderness.   Musculoskeletal:      Cervical back: No edema.   Skin:     General: Skin is warm.   Neurological:      " Mental Status: He is alert.      Cranial Nerves: No cranial nerve deficit.      Sensory: No sensory deficit.   Psychiatric:         Speech: Speech normal.         Behavior: Behavior normal.         Results Review:      Results for orders placed during the hospital encounter of 09/13/22    Adult Transthoracic Echo Complete w/ Color, Spectral and Contrast if necessary per protocol    Interpretation Summary  · Left ventricular ejection fraction appears to be 61 - 65%. Left ventricular systolic function is normal.  · Left ventricular wall thickness is consistent with mild concentric hypertrophy.  · Left ventricular diastolic function was normal.  · Moderate to severe aortic valve stenosis is present.  · Normal global longitudinal LV strain (GLS) = -17.0%.  · Estimated right ventricular systolic pressure from tricuspid regurgitation is normal (<35 mmHg).  · Similar to prior echo dated 04/5/22     CS Images     Radiology Images   Study Result    Narrative & Impression   High-resolution computed tomography imaging of the chest was performed  Particular attention paid to coronary arteries.  Images from the examination were analyzed for the presence and extent of coronary artery calcification using coronary calcification quantification software.  Patient tolerated the procedure well and there were no complications.  The results of coronary calcification analysis are as below.  The patient scores compared with published data relating to scores for people of similar age and the same gender.     Left main: 0  Left anterior descending coronary artery: 12.4  Left circumflex: 2.1  Right coronary artery: 7.9  Total calcium score: 22.4     This represents a score that is at the 50th percentile for same age, gender and race/ethnicity.  Overall risk of cardiovascular events is considered to be LOW               ____________________________________________________________________________________________________________________________________________  Health maintenance and recommendations    Low salt/ HTN/ Heart healthy carbohydrate restricted cardiac diet   The patient is advised to reduce or avoid caffeine or other cardiac stimulants.   Minimize or avoid  NSAID-type medications      Monitor for any signs of bleeding including red or dark stools. Fall precautions.   Advised staying uptodate with immunizations per established standard guidelines.    Offered to give patient  a copy of my notes     Questions were encouraged, asked and answered to the patient's  understanding and satisfaction. Questions if any regarding current medications and side effects, need for refills and importance of compliance to medications stressed.    Reviewed available prior notes, consults, prior visits, laboratory findings, radiology and cardiology relevant reports. Updated chart as applicable. I have reviewed the patient's medical history in detail and updated the computerized patient record as relevant.      Updated patient regarding any new or relevant abnormalities on review of records or any new findings on physical exam. Mentioned to patient about purpose of visit and desirable health short and long term goals and objectives.    Primary to monitor CBC CMP Lipid panel and TSH as applicable    ___________________________________________________________________________________________________________________________________________       Procedures    Assessment & Plan   Diagnoses and all orders for this visit:    1. Nonrheumatic aortic valve stenosis (Primary)    2. Primary hypertension  -     CT Chest With Contrast; Future    3. Mixed hyperlipidemia    4. Family history of early CAD    5. Bicuspid aortic valve  -     CT Chest With Contrast; Future    6. Bruit of right carotid artery  -     US Carotid Bilateral; Future    7. LVH (left  ventricular hypertrophy)        Plan    Orders Placed This Encounter   Procedures   • CT Chest With Contrast     Standing Status:   Future     Standing Expiration Date:   11/1/2023   • US Carotid Bilateral     Standing Status:   Future     Standing Expiration Date:   11/1/2023     Order Specific Question:   Reason for Exam:     Answer:   carotid bruit      Call for results of above testing.     Check BP and heart rates twice daily initially till blood pressures and heart rates under good control and then at least 3x / week,   If blood pressures continue to be well-controlled then can check week a month  at home and bring a recording for review next visit  If BP >130/85 or < 100/60 persistently over 3 reading 30 mins apart or if heart rates persistently above 100 bpm or less than 55 bpm call sooner for evaluation and advise     Monitor aortic stenosis and left ventricular ejection fraction by serial echo      Patient expressed understanding  Encouraged and answered all questions   Discussed with the patient and all questioned fully answered. He will call me if any problems arise.   Discussed results of prior testing with patient : echo      Continue low dose angiotensin receptor blocker    Stay on Aspirin   Monitor for any signs of bleeding including red or dark stools as well as easy bruisabilty. Fall precautions.          Keep LDL below 70 mg/dl. Monitor liver and renal functions.   Monitor CBC, CMP, TSH (as indicated) and Lipid Panel by primary     I support the patient's decision to take the Covid -19 vaccine   Had required complete course   No major issues   Now fully immunized       Follow up with ANIBAL Moore          Return in about 6 months (around 5/1/2023).

## 2022-11-02 NOTE — PROGRESS NOTES
Subjective    Mr. Magallon is 62 y.o. male    Chief Complaint: Kidney stone    History of Present Illness    62-year-old male established patient follow-up for kidney stone.  KUB x-ray done today reviewed by me with the patient shows continued enlargement of his right upper pole kidney stone now measuring 5 to 6 mm up from 3 to 4 mm last year.  He denies bothersome LUTS.  He gets his PSAs checked with Dr. Doss.  UA today is clear.     I independently visualized and reviewed the patient's prior imaging studies today in clinic and discussed the imaging findings with the patient.    The following portions of the patient's history were reviewed and updated as appropriate: allergies, current medications, past family history, past medical history, past social history, past surgical history and problem list.    Review of Systems      Current Outpatient Medications:   •  aspirin 81 MG EC tablet, Take 81 mg by mouth Daily., Disp: , Rfl:   •  bimatoprost (LUMIGAN) 0.01 % ophthalmic drops, , Disp: , Rfl:   •  losartan (COZAAR) 25 MG tablet, TAKE 1 TABLET BY MOUTH EVERY DAY, Disp: 90 tablet, Rfl: 3  •  simvastatin (ZOCOR) 40 MG tablet, TAKE 1 TABLET BY MOUTH EVERY DAY AT NIGHT, Disp: 90 tablet, Rfl: 4    Past Medical History:   Diagnosis Date   • Bicuspid aortic valve    • Glaucoma    • LVH (left ventricular hypertrophy) 10/29/2020   • Mixed hyperlipidemia 10/29/2020   • Nonrheumatic aortic valve insufficiency    • Nonrheumatic aortic valve stenosis 4/13/2022   • Primary hypertension 4/13/2022       History reviewed. No pertinent surgical history.    Social History     Socioeconomic History   • Marital status:    Tobacco Use   • Smoking status: Never   • Smokeless tobacco: Never   Vaping Use   • Vaping Use: Never used   Substance and Sexual Activity   • Alcohol use: Yes   • Drug use: Never   • Sexual activity: Defer       History reviewed. No pertinent family history.    Objective    Temp 97.4 °F (36.3 °C)   Ht  "190.5 cm (75\")   Wt 104 kg (229 lb)   BMI 28.62 kg/m²     Physical Exam        Results for orders placed or performed in visit on 11/07/22   POC Urinalysis Dipstick, Multipro    Specimen: Urine   Result Value Ref Range    Color Yellow Yellow, Straw, Dark Yellow, Autumn    Clarity, UA Clear Clear    Glucose, UA Negative Negative mg/dL    Bilirubin Negative Negative    Ketones, UA Negative Negative    Specific Gravity  1.030 1.005 - 1.030    Blood, UA Trace (A) Negative    pH, Urine 5.5 5.0 - 8.0    Protein, POC Negative Negative mg/dL    Urobilinogen, UA Normal Normal, 0.2 E.U./dL    Nitrite, UA Negative Negative    Leukocytes Negative Negative     Assessment and Plan    Diagnoses and all orders for this visit:    1. Kidney stone (Primary)  -     POC Urinalysis Dipstick, Multipro  -     Case Request; Standing  -     ECG 12 Lead; Future  -     sodium chloride 0.9 % infusion  -     CBC (No Diff); Future  -     Basic Metabolic Panel; Future  -     ceFAZolin (ANCEF) 2 g in sodium chloride 0.9 % 100 mL IVPB  -     Case Request    Other orders  -     Follow Anesthesia Guidelines / Protocol; Future  -     Obtain Informed Consent; Future  -     Provide NPO Instructions to Patient; Future  -     Chlorhexidine Skin Prep; Future  -     Follow Anesthesia Guidelines / Protocol; Standing  -     Verify / Perform Chlorhexidine Skin Prep; Standing  -     Verify / Perform Chlorhexidine Skin Prep if Indicated (If Not Already Completed); Standing  -     XR Abdomen KUB; Standing      Increasing size of right upper pole kidney stone.  We discussed options for stone management including observation, ureteroscopy laser lithotripsy stent placement, or right extracorporeal shockwave lithotripsy.  He would like to avoid a stent if at all possible and would like to schedule right ESWL before the end of the month.  We discussed risks of the procedure including but not limited to infection, bleeding, need for additional procedures, inability to " break up/pass any/all stone, complications of anesthesia.  He voiced understanding and provided full consent to proceed with right ESWL on 11/30/22.  He was instructed to hold his aspirin 7 days prior.      This document has been signed by MORRO Murphy MD on November 8, 2022 19:34 CST

## 2022-11-07 ENCOUNTER — OFFICE VISIT (OUTPATIENT)
Dept: UROLOGY | Facility: CLINIC | Age: 62
End: 2022-11-07

## 2022-11-07 ENCOUNTER — HOSPITAL ENCOUNTER (OUTPATIENT)
Dept: GENERAL RADIOLOGY | Facility: HOSPITAL | Age: 62
Discharge: HOME | End: 2022-11-07
Admitting: UROLOGY

## 2022-11-07 VITALS — TEMPERATURE: 97.4 F | WEIGHT: 229 LBS | HEIGHT: 75 IN | BODY MASS INDEX: 28.47 KG/M2

## 2022-11-07 DIAGNOSIS — N20.0 KIDNEY STONE: ICD-10-CM

## 2022-11-07 DIAGNOSIS — N20.0 KIDNEY STONE: Primary | ICD-10-CM

## 2022-11-07 LAB
BILIRUB BLD-MCNC: NEGATIVE MG/DL
CLARITY, POC: CLEAR
COLOR UR: YELLOW
GLUCOSE UR STRIP-MCNC: NEGATIVE MG/DL
KETONES UR QL: NEGATIVE
LEUKOCYTE EST, POC: NEGATIVE
NITRITE UR-MCNC: NEGATIVE MG/ML
PH UR: 5.5 [PH] (ref 5–8)
PROT UR STRIP-MCNC: NEGATIVE MG/DL
RBC # UR STRIP: ABNORMAL /UL
SP GR UR: 1.03 (ref 1–1.03)
UROBILINOGEN UR QL: NORMAL

## 2022-11-07 PROCEDURE — 81001 URINALYSIS AUTO W/SCOPE: CPT | Performed by: UROLOGY

## 2022-11-07 PROCEDURE — 74018 RADEX ABDOMEN 1 VIEW: CPT

## 2022-11-07 PROCEDURE — 99214 OFFICE O/P EST MOD 30 MIN: CPT | Performed by: UROLOGY

## 2022-11-07 RX ORDER — SODIUM CHLORIDE 9 MG/ML
100 INJECTION, SOLUTION INTRAVENOUS CONTINUOUS
Status: CANCELLED | OUTPATIENT
Start: 2022-11-07

## 2022-11-16 ENCOUNTER — HOSPITAL ENCOUNTER (OUTPATIENT)
Dept: CT IMAGING | Facility: HOSPITAL | Age: 62
Discharge: HOME | End: 2022-11-16

## 2022-11-16 ENCOUNTER — HOSPITAL ENCOUNTER (OUTPATIENT)
Dept: ULTRASOUND IMAGING | Facility: HOSPITAL | Age: 62
Discharge: HOME | End: 2022-11-16

## 2022-11-16 DIAGNOSIS — I10 PRIMARY HYPERTENSION: ICD-10-CM

## 2022-11-16 DIAGNOSIS — Q23.1 BICUSPID AORTIC VALVE: ICD-10-CM

## 2022-11-16 DIAGNOSIS — R09.89 BRUIT OF RIGHT CAROTID ARTERY: ICD-10-CM

## 2022-11-16 LAB — CREAT BLDA-MCNC: 1.2 MG/DL (ref 0.6–1.3)

## 2022-11-16 PROCEDURE — 71260 CT THORAX DX C+: CPT

## 2022-11-16 PROCEDURE — 25010000002 IOPAMIDOL 61 % SOLUTION: Performed by: INTERNAL MEDICINE

## 2022-11-16 PROCEDURE — 82565 ASSAY OF CREATININE: CPT

## 2022-11-16 PROCEDURE — 93880 EXTRACRANIAL BILAT STUDY: CPT | Performed by: SURGERY

## 2022-11-16 PROCEDURE — 93880 EXTRACRANIAL BILAT STUDY: CPT

## 2022-11-16 RX ADMIN — IOPAMIDOL 100 ML: 612 INJECTION, SOLUTION INTRAVENOUS at 07:30

## 2022-11-17 ENCOUNTER — TELEPHONE (OUTPATIENT)
Dept: CARDIOLOGY | Facility: CLINIC | Age: 62
End: 2022-11-17

## 2022-11-17 NOTE — TELEPHONE ENCOUNTER
PT HAS AN UPCOMING LITHOTRIPSY SCHEDULED FOR 11/30/22 - HE WOULD LIKE TO MAKE SURE HE IS STILL OKAY TO PROCEED, SINCE HE NOW HAS AN AORTIC ANEURYSM.    PLEASE ADVISE

## 2022-11-17 NOTE — TELEPHONE ENCOUNTER
PT CALLED TO REPORT THAT RECENT CT CHEST STATES A PROSTHESIS IS PRESENT - PER PT, HE HAS NEVER HAD ANY SORT OF PROSTHESIS PLACED - ALSO THAT HE RECEIVED A CREATININE RESULT & DOESN'T REMEMBER GIVING ANY BLOOD    READING PHYSICIAN NOTIFIED & ADDENDUM HAS BEEN MADE TO REPORT - PT NOTIFIED OF REPORT & ALSO THAT POC CREATININE WAS TAKEN WHEN CONTRAST WAS GIVEN    PT WOULD LIKE TO KNOW WHAT THE TREATMENT PLAN IS FOR CURRENT CT CHEST (ASCENDING AORTIC ANEURYSM) RESULT - PLEASE ADVISE    NEXT F/U IS 5/01/23

## 2022-11-22 DIAGNOSIS — I71.20 THORACIC AORTIC ANEURYSM WITHOUT RUPTURE, UNSPECIFIED PART: Primary | ICD-10-CM

## 2022-11-23 ENCOUNTER — PRE-ADMISSION TESTING (OUTPATIENT)
Dept: PREADMISSION TESTING | Facility: HOSPITAL | Age: 62
End: 2022-11-23

## 2022-11-23 VITALS
WEIGHT: 224.65 LBS | BODY MASS INDEX: 28.83 KG/M2 | OXYGEN SATURATION: 98 % | HEIGHT: 74 IN | DIASTOLIC BLOOD PRESSURE: 76 MMHG | SYSTOLIC BLOOD PRESSURE: 133 MMHG | HEART RATE: 83 BPM | RESPIRATION RATE: 16 BRPM

## 2022-11-23 DIAGNOSIS — N20.0 KIDNEY STONE: ICD-10-CM

## 2022-11-23 PROCEDURE — 80048 BASIC METABOLIC PNL TOTAL CA: CPT | Performed by: UROLOGY

## 2022-11-23 PROCEDURE — 85027 COMPLETE CBC AUTOMATED: CPT | Performed by: UROLOGY

## 2022-11-23 PROCEDURE — 93005 ELECTROCARDIOGRAM TRACING: CPT

## 2022-11-23 PROCEDURE — 93010 ELECTROCARDIOGRAM REPORT: CPT | Performed by: INTERNAL MEDICINE

## 2022-11-23 RX ORDER — TIMOLOL MALEATE 5 MG/ML
1 SOLUTION/ DROPS OPHTHALMIC 2 TIMES DAILY
COMMUNITY
Start: 2022-11-12

## 2022-11-23 NOTE — DISCHARGE INSTRUCTIONS
Before you come to the hospital        Arrival time: AS DIRECTED BY OFFICE   HOLD YOUR LOSARTAN 24 HRS PRIOR TO SURGERY             ALL OTHER HOME MEDICATION CHECK WITH YOUR PHYSICIAN (especially if you are taking diabetes medicines or blood thinners)    Do not take any Erectile Dysfunction medications (EX: CIALIS, VIAGRA) 24 hours prior to surgery.      If you were given and instructed to use a germ- killing soap, use as directed the night before surgery and again the morning of surgery or as directed by your surgeon.    (See attached information for How to Use Chlorhexidine for Bathing if applicable.)            Eating and drinking restrictions prior to scheduled arrival time    2 Hours before arrival time STOP   Drinking Clear liquids (water, apple juice-no pulp)     6 Hours before arrival time STOP   Milk or drinks that contain milk, full liquids    6 Hours before arrival time STOP   Light meals or foods, such as toast or cereal    8 Hours before arrival time STOP   Heavy foods, such as meat, fried foods, or fatty foods    (It is extremely important that you follow these guidelines to prevent delay or cancelation of your procedure)     Clear Liquids  Water and flavored water                                                                      Clear Fruit juices, such as cranberry juice and apple juice.  Black coffee (NO cream of any kind, including powdered).  Plain tea  Clear bouillon or broth.  Flavored gelatin.  Soda.  Gatorade or Powerade.  Full liquid examples  Juices that have pulp.  Frozen ice pops that contain fruit pieces.  Coffee with creamer  Milk.  Yogurt.                MANAGING PAIN AFTER SURGERY    We know you are probably wondering what your pain will be like after surgery.  Following surgery it is unrealistic to expect you will not have pain.   Pain is how our bodies let us know that something is wrong or cautions us to be careful.  That said, our goal is to make your pain tolerable.    Methods  we may use to treat your pain include (oral or IV medications, PCAs, epidurals, nerve blocks, etc.)   While some procedures require IV pain medications for a short time after surgery, transitioning to pain medications by mouth allows for better management of pain.   Your nurse will encourage you to take oral pain medications whenever possible.  IV medications work almost immediately, but only last a short while.  Taking medications by mouth allows for a more constant level of medication in your blood stream for a longer period of time.      Once your pain is out of control it is harder to get back under control.  It is important you are aware when your next dose of pain medication is due.  If you are admitted, your nurse may write the time of your next dose on the white board in your room to help you remember.      We are interested in your pain and encourage you to inform us about aggravating factors during your visit.   Many times a simple repositioning every few hours can make a big difference.    If your physician says it is okay, do not let your pain prevent you from getting out of bed. Be sure to call your nurse for assistance prior to getting up so you do not fall.      Before surgery, please decide your tolerable pain goal.  These faces help describe the pain ratings we use on a 0-10 scale.   Be prepared to tell us your goal and whether or not you take pain or anxiety medications at home.          Preparing for Surgery  Preparing for surgery is an important part of your care. It can make things go more smoothly and help you avoid complications. The steps leading up to surgery may vary among hospitals. Follow all instructions given to you by your health care providers. Ask questions if you do not understand something. Talk about any concerns that you have.  Here are some questions to consider asking before your surgery:  If my surgery is not an emergency (is elective), when would be the best time to have the  surgery?  What arrangements do I need to make for work, home, or school?  What will my recovery be like? How long will it be before I can return to normal activities?  Will I need to prepare my home? Will I need to arrange care for me or my children?  Should I expect to have pain after surgery? What are my pain management options? Are there nonmedical options that I can try for pain?  Tell a health care provider about:  Any allergies you have.  All medicines you are taking, including vitamins, herbs, eye drops, creams, and over-the-counter medicines.  Any problems you or family members have had with anesthetic medicines.  Any blood disorders you have.  Any surgeries you have had.  Any medical conditions you have.  Whether you are pregnant or may be pregnant.  What are the risks?  The risks and complications of surgery depend on the specific procedure that you have. Discuss all the risks with your health care providers before your surgery. Ask about common surgical complications, which may include:  Infection.  Bleeding or a need for blood replacement (transfusion).  Allergic reactions to medicines.  Damage to surrounding nerves, tissues, or structures.  A blood clot.  Scarring.  Failure of the surgery to correct the problem.  Follow these instructions before the procedure:  Several days or weeks before your procedure  You may have a physical exam by your primary health care provider to make sure it is safe for you to have surgery.  You may have testing. This may include a chest X-ray, blood and urine tests, electrocardiogram (ECG), or other testing.  Ask your health care provider about:  Changing or stopping your regular medicines. This is especially important if you are taking diabetes medicines or blood thinners.  Taking medicines such as aspirin and ibuprofen. These medicines can thin your blood. Do not take these medicines unless your health care provider tells you to take them.  Taking over-the-counter  medicines, vitamins, herbs, and supplements.  Do not use any products that contain nicotine or tobacco, such as cigarettes and e-cigarettes. If you need help quitting, ask your health care provider.  Avoid alcohol.  Ask your health care provider if there are exercises you can do to prepare for surgery.  Eat a healthy diet.   Plan to have someone take you home from the hospital or clinic.  Plan to have a responsible adult care for you for at least 24 hours after you leave the hospital or clinic. This is important.  The day before your procedure  You may be given antibiotic medicine to take by mouth to help prevent infection. Take it as told by your health care provider.  You may be asked to shower with a germ-killing soap.  Follow instructions from your health care provider about eating and drinking restrictions. This includes gum, mints and hard candy.  Pack comfortable clothes according to your procedure.   The day of your procedure  You may need to take another shower with a germ-killing soap before you leave home in the morning.  With a small sip of water, take only the medicines that you are told to take.  Remove all jewelry including rings.   Leave anything you consider valuable at home except hearing aids if needed.  Do not wear any makeup, nail polish, powder, deodorant, lotion, hair accessories, or anything on your skin or body except your clothes.  If you will be staying in the hospital, bring a case to hold your glasses, contacts, or dentures. You may also want to bring your robe and non-skid footwear.  If you wear oxygen at home, bring it with you the day of surgery.  If instructed by your health care provider, bring your sleep apnea device with you on the day of your surgery (if this applies to you).  You may want to leave your suitcase and sleep apnea device in the car until after surgery.   Arrive at the hospital as scheduled.  Bring a friend or family member with you who can help to answer questions  and be present while you meet with your health care provider.  At the hospital  When you arrive at the hospital:  Go to registration located at the main entrance of the hospital. You will be registered and given a beeper and a sticker sheet. Take the stickers to the Outpatient nurses desk and place in the black tray. This is to notify staff that you have arrived. Then return to the lobby to wait.   When your beeper lights up and vibrates proceed through the double doors, under the stairs, and a member of the Outpatient Surgery staff will escort you to your preoperative room.  You may have to wear compression sleeves. These help to prevent blood clots and reduce swelling in your legs.  An IV may be inserted into one of your veins.              In the operating room, you may be given one or more of the following:        A medicine to help you relax (sedative).        A medicine to numb the area (local anesthetic).        A medicine to make you fall asleep (general anesthetic).        A medicine that is injected into an area of your body to numb everything below the                      injection site (regional anesthetic).  You may be given an antibiotic through your IV to help prevent infection.  Your surgical site will be marked or identified.    Contact a health care provider if you:  Develop a fever of more than 100.4°F (38°C) or other feelings of illness during the 48 hours before your surgery.  Have symptoms that get worse.  Have questions or concerns about your surgery.  Summary  Preparing for surgery can make the procedure go more smoothly and lower your risk of complications.  Before surgery, make a list of questions and concerns to discuss with your surgeon. Ask about the risks and possible complications.  In the days or weeks before your surgery, follow all instructions from your health care provider. You may need to stop smoking, avoid alcohol, follow eating restrictions, and change or stop your regular  medicines.  Contact your surgeon if you develop a fever or other signs of illness during the few days before your surgery.  This information is not intended to replace advice given to you by your health care provider. Make sure you discuss any questions you have with your health care provider.  Document Revised: 12/21/2018 Document Reviewed: 10/23/2018  [employer] Patient Education © 2021 [employer]

## 2022-11-25 LAB
QT INTERVAL: 372 MS
QTC INTERVAL: 418 MS

## 2022-11-30 ENCOUNTER — HOSPITAL ENCOUNTER (OUTPATIENT)
Facility: HOSPITAL | Age: 62
Setting detail: HOSPITAL OUTPATIENT SURGERY
Discharge: HOME | End: 2022-11-30
Attending: UROLOGY | Admitting: UROLOGY

## 2022-11-30 ENCOUNTER — ANESTHESIA (OUTPATIENT)
Dept: PERIOP | Facility: HOSPITAL | Age: 62
End: 2022-11-30

## 2022-11-30 ENCOUNTER — APPOINTMENT (OUTPATIENT)
Dept: GENERAL RADIOLOGY | Facility: HOSPITAL | Age: 62
End: 2022-11-30

## 2022-11-30 ENCOUNTER — ANESTHESIA EVENT (OUTPATIENT)
Dept: PERIOP | Facility: HOSPITAL | Age: 62
End: 2022-11-30

## 2022-11-30 VITALS
OXYGEN SATURATION: 100 % | DIASTOLIC BLOOD PRESSURE: 84 MMHG | HEART RATE: 60 BPM | SYSTOLIC BLOOD PRESSURE: 117 MMHG | RESPIRATION RATE: 16 BRPM | TEMPERATURE: 96.9 F

## 2022-11-30 DIAGNOSIS — N20.0 KIDNEY STONE: ICD-10-CM

## 2022-11-30 PROCEDURE — 74018 RADEX ABDOMEN 1 VIEW: CPT

## 2022-11-30 PROCEDURE — 25010000002 CEFAZOLIN PER 500 MG: Performed by: UROLOGY

## 2022-11-30 PROCEDURE — 25010000002 FENTANYL CITRATE (PF) 100 MCG/2ML SOLUTION

## 2022-11-30 PROCEDURE — 50590 FRAGMENTING OF KIDNEY STONE: CPT | Performed by: UROLOGY

## 2022-11-30 PROCEDURE — 25010000002 PROPOFOL 10 MG/ML EMULSION

## 2022-11-30 RX ORDER — BUPIVACAINE HCL/0.9 % NACL/PF 0.1 %
2 PLASTIC BAG, INJECTION (ML) EPIDURAL ONCE
Status: COMPLETED | OUTPATIENT
Start: 2022-11-30 | End: 2022-11-30

## 2022-11-30 RX ORDER — SODIUM CHLORIDE, SODIUM LACTATE, POTASSIUM CHLORIDE, CALCIUM CHLORIDE 600; 310; 30; 20 MG/100ML; MG/100ML; MG/100ML; MG/100ML
9 INJECTION, SOLUTION INTRAVENOUS CONTINUOUS
Status: DISCONTINUED | OUTPATIENT
Start: 2022-11-30 | End: 2022-11-30 | Stop reason: HOSPADM

## 2022-11-30 RX ORDER — ONDANSETRON 2 MG/ML
4 INJECTION INTRAMUSCULAR; INTRAVENOUS ONCE AS NEEDED
Status: DISCONTINUED | OUTPATIENT
Start: 2022-11-30 | End: 2022-11-30 | Stop reason: HOSPADM

## 2022-11-30 RX ORDER — DROPERIDOL 2.5 MG/ML
0.62 INJECTION, SOLUTION INTRAMUSCULAR; INTRAVENOUS ONCE AS NEEDED
Status: DISCONTINUED | OUTPATIENT
Start: 2022-11-30 | End: 2022-11-30 | Stop reason: HOSPADM

## 2022-11-30 RX ORDER — HYDROCODONE BITARTRATE AND ACETAMINOPHEN 7.5; 325 MG/1; MG/1
1 TABLET ORAL ONCE AS NEEDED
Status: DISCONTINUED | OUTPATIENT
Start: 2022-11-30 | End: 2022-11-30 | Stop reason: HOSPADM

## 2022-11-30 RX ORDER — SODIUM CHLORIDE 9 MG/ML
100 INJECTION, SOLUTION INTRAVENOUS CONTINUOUS
Status: DISCONTINUED | OUTPATIENT
Start: 2022-11-30 | End: 2022-11-30 | Stop reason: HOSPADM

## 2022-11-30 RX ORDER — PROPOFOL 10 MG/ML
VIAL (ML) INTRAVENOUS AS NEEDED
Status: DISCONTINUED | OUTPATIENT
Start: 2022-11-30 | End: 2022-11-30 | Stop reason: SURG

## 2022-11-30 RX ORDER — IBUPROFEN 600 MG/1
600 TABLET ORAL ONCE AS NEEDED
Status: DISCONTINUED | OUTPATIENT
Start: 2022-11-30 | End: 2022-11-30 | Stop reason: HOSPADM

## 2022-11-30 RX ORDER — ONDANSETRON 2 MG/ML
4 INJECTION INTRAMUSCULAR; INTRAVENOUS
Status: DISCONTINUED | OUTPATIENT
Start: 2022-11-30 | End: 2022-11-30 | Stop reason: HOSPADM

## 2022-11-30 RX ORDER — MIDAZOLAM HYDROCHLORIDE 1 MG/ML
1 INJECTION INTRAMUSCULAR; INTRAVENOUS
Status: DISCONTINUED | OUTPATIENT
Start: 2022-11-30 | End: 2022-11-30 | Stop reason: HOSPADM

## 2022-11-30 RX ORDER — FENTANYL CITRATE 50 UG/ML
25 INJECTION, SOLUTION INTRAMUSCULAR; INTRAVENOUS
Status: DISCONTINUED | OUTPATIENT
Start: 2022-11-30 | End: 2022-11-30 | Stop reason: HOSPADM

## 2022-11-30 RX ORDER — HYDROMORPHONE HYDROCHLORIDE 1 MG/ML
0.5 INJECTION, SOLUTION INTRAMUSCULAR; INTRAVENOUS; SUBCUTANEOUS
Status: DISCONTINUED | OUTPATIENT
Start: 2022-11-30 | End: 2022-11-30 | Stop reason: HOSPADM

## 2022-11-30 RX ORDER — EPHEDRINE SULFATE 50 MG/ML
INJECTION, SOLUTION INTRAVENOUS AS NEEDED
Status: DISCONTINUED | OUTPATIENT
Start: 2022-11-30 | End: 2022-11-30 | Stop reason: SURG

## 2022-11-30 RX ORDER — TAMSULOSIN HYDROCHLORIDE 0.4 MG/1
1 CAPSULE ORAL DAILY
Qty: 30 CAPSULE | Refills: 0 | Status: SHIPPED | OUTPATIENT
Start: 2022-11-30 | End: 2022-12-27

## 2022-11-30 RX ORDER — NALOXONE HCL 0.4 MG/ML
0.04 VIAL (ML) INJECTION AS NEEDED
Status: DISCONTINUED | OUTPATIENT
Start: 2022-11-30 | End: 2022-11-30 | Stop reason: HOSPADM

## 2022-11-30 RX ORDER — FENTANYL CITRATE 50 UG/ML
INJECTION, SOLUTION INTRAMUSCULAR; INTRAVENOUS AS NEEDED
Status: DISCONTINUED | OUTPATIENT
Start: 2022-11-30 | End: 2022-11-30 | Stop reason: SURG

## 2022-11-30 RX ORDER — HYDROCODONE BITARTRATE AND ACETAMINOPHEN 7.5; 325 MG/1; MG/1
1 TABLET ORAL EVERY 6 HOURS PRN
Qty: 15 TABLET | Refills: 0 | Status: SHIPPED | OUTPATIENT
Start: 2022-11-30 | End: 2022-12-15

## 2022-11-30 RX ORDER — SODIUM CHLORIDE 0.9 % (FLUSH) 0.9 %
3 SYRINGE (ML) INJECTION AS NEEDED
Status: DISCONTINUED | OUTPATIENT
Start: 2022-11-30 | End: 2022-11-30 | Stop reason: HOSPADM

## 2022-11-30 RX ORDER — SODIUM CHLORIDE, SODIUM LACTATE, POTASSIUM CHLORIDE, CALCIUM CHLORIDE 600; 310; 30; 20 MG/100ML; MG/100ML; MG/100ML; MG/100ML
1000 INJECTION, SOLUTION INTRAVENOUS CONTINUOUS
Status: DISCONTINUED | OUTPATIENT
Start: 2022-11-30 | End: 2022-11-30 | Stop reason: HOSPADM

## 2022-11-30 RX ORDER — FLUMAZENIL 0.1 MG/ML
0.2 INJECTION INTRAVENOUS AS NEEDED
Status: DISCONTINUED | OUTPATIENT
Start: 2022-11-30 | End: 2022-11-30 | Stop reason: HOSPADM

## 2022-11-30 RX ORDER — LABETALOL HYDROCHLORIDE 5 MG/ML
5 INJECTION, SOLUTION INTRAVENOUS
Status: DISCONTINUED | OUTPATIENT
Start: 2022-11-30 | End: 2022-11-30 | Stop reason: HOSPADM

## 2022-11-30 RX ORDER — ONDANSETRON 4 MG/1
4 TABLET, ORALLY DISINTEGRATING ORAL EVERY 6 HOURS PRN
Qty: 6 TABLET | Refills: 1 | Status: SHIPPED | OUTPATIENT
Start: 2022-11-30 | End: 2022-12-15

## 2022-11-30 RX ORDER — LIDOCAINE HYDROCHLORIDE 10 MG/ML
0.5 INJECTION, SOLUTION EPIDURAL; INFILTRATION; INTRACAUDAL; PERINEURAL ONCE AS NEEDED
Status: DISCONTINUED | OUTPATIENT
Start: 2022-11-30 | End: 2022-11-30 | Stop reason: HOSPADM

## 2022-11-30 RX ORDER — SODIUM CHLORIDE 0.9 % (FLUSH) 0.9 %
10 SYRINGE (ML) INJECTION EVERY 12 HOURS SCHEDULED
Status: DISCONTINUED | OUTPATIENT
Start: 2022-11-30 | End: 2022-11-30 | Stop reason: HOSPADM

## 2022-11-30 RX ORDER — LIDOCAINE HYDROCHLORIDE 20 MG/ML
INJECTION, SOLUTION EPIDURAL; INFILTRATION; INTRACAUDAL; PERINEURAL AS NEEDED
Status: DISCONTINUED | OUTPATIENT
Start: 2022-11-30 | End: 2022-11-30 | Stop reason: SURG

## 2022-11-30 RX ORDER — SODIUM CHLORIDE 0.9 % (FLUSH) 0.9 %
10 SYRINGE (ML) INJECTION AS NEEDED
Status: DISCONTINUED | OUTPATIENT
Start: 2022-11-30 | End: 2022-11-30 | Stop reason: HOSPADM

## 2022-11-30 RX ORDER — DEXTROSE MONOHYDRATE 25 G/50ML
12.5 INJECTION, SOLUTION INTRAVENOUS AS NEEDED
Status: DISCONTINUED | OUTPATIENT
Start: 2022-11-30 | End: 2022-11-30 | Stop reason: HOSPADM

## 2022-11-30 RX ORDER — OXYCODONE AND ACETAMINOPHEN 10; 325 MG/1; MG/1
1 TABLET ORAL ONCE AS NEEDED
Status: DISCONTINUED | OUTPATIENT
Start: 2022-11-30 | End: 2022-11-30 | Stop reason: HOSPADM

## 2022-11-30 RX ADMIN — SODIUM CHLORIDE, POTASSIUM CHLORIDE, SODIUM LACTATE AND CALCIUM CHLORIDE 1000 ML: 600; 310; 30; 20 INJECTION, SOLUTION INTRAVENOUS at 14:52

## 2022-11-30 RX ADMIN — EPHEDRINE SULFATE 10 MG: 50 INJECTION INTRAVENOUS at 16:30

## 2022-11-30 RX ADMIN — PROPOFOL 200 MG: 10 INJECTION, EMULSION INTRAVENOUS at 16:20

## 2022-11-30 RX ADMIN — PROPOFOL 200 MG: 10 INJECTION, EMULSION INTRAVENOUS at 16:19

## 2022-11-30 RX ADMIN — Medication 2 G: at 16:25

## 2022-11-30 RX ADMIN — PROPOFOL 100 MG: 10 INJECTION, EMULSION INTRAVENOUS at 16:35

## 2022-11-30 RX ADMIN — FENTANYL CITRATE 100 MCG: 50 INJECTION INTRAMUSCULAR; INTRAVENOUS at 16:23

## 2022-11-30 RX ADMIN — HYDROCODONE BITARTRATE AND ACETAMINOPHEN 1 TABLET: 7.5; 325 TABLET ORAL at 17:50

## 2022-11-30 RX ADMIN — LIDOCAINE HYDROCHLORIDE 100 MG: 20 INJECTION, SOLUTION EPIDURAL; INFILTRATION; INTRACAUDAL; PERINEURAL at 16:19

## 2022-11-30 NOTE — ANESTHESIA PREPROCEDURE EVALUATION
Anesthesia Evaluation     Patient summary reviewed   no history of anesthetic complications:  NPO Solid Status: > 8 hours             Airway   Mallampati: II  Dental      Pulmonary    (-) COPD, asthma, sleep apnea, not a smoker  Cardiovascular   Exercise tolerance: excellent (>7 METS)    (+) hypertension, valvular problems/murmurs (mod to severe) AS, PVD, hyperlipidemia,   (-) pacemaker, past MI, angina, cardiac stents      Neuro/Psych  (-) seizures, TIA, CVA  GI/Hepatic/Renal/Endo    (+)   renal disease stones,   (-) GERD, liver disease, diabetes    Musculoskeletal     Abdominal    Substance History      OB/GYN          Other                        Anesthesia Plan    ASA 3     general     intravenous induction     Anesthetic plan, risks, benefits, and alternatives have been provided, discussed and informed consent has been obtained with: patient.        CODE STATUS:

## 2022-11-30 NOTE — ANESTHESIA PROCEDURE NOTES
Airway  Urgency: elective    Date/Time: 11/30/2022 4:22 PM  Airway not difficult    General Information and Staff    Patient location during procedure: OR    Indications and Patient Condition  Indications for airway management: airway protection    Preoxygenated: yes  Mask difficulty assessment: 0 - not attempted    Final Airway Details  Final airway type: supraglottic airway      Successful airway: I-gel  Size 4     Number of attempts at approach: 1  Assessment: lips, teeth, and gum same as pre-op and atraumatic intubation

## 2022-12-09 ENCOUNTER — APPOINTMENT (OUTPATIENT)
Dept: CT IMAGING | Facility: HOSPITAL | Age: 62
End: 2022-12-09

## 2022-12-15 ENCOUNTER — OFFICE VISIT (OUTPATIENT)
Dept: CARDIAC SURGERY | Facility: CLINIC | Age: 62
End: 2022-12-15

## 2022-12-15 VITALS
HEIGHT: 74 IN | SYSTOLIC BLOOD PRESSURE: 139 MMHG | BODY MASS INDEX: 28.85 KG/M2 | DIASTOLIC BLOOD PRESSURE: 93 MMHG | OXYGEN SATURATION: 97 % | WEIGHT: 224.8 LBS | HEART RATE: 64 BPM

## 2022-12-15 DIAGNOSIS — Q23.1 BICUSPID AORTIC VALVE: ICD-10-CM

## 2022-12-15 DIAGNOSIS — I71.20 THORACIC AORTIC ANEURYSM WITHOUT RUPTURE, UNSPECIFIED PART: Primary | ICD-10-CM

## 2022-12-15 PROCEDURE — 99205 OFFICE O/P NEW HI 60 MIN: CPT | Performed by: SURGERY

## 2022-12-15 NOTE — PROGRESS NOTES
Cardiothoracic Surgery Consultation    Referring Physician: Dr. Raul Monteiro    Primary Care Physician: Dr. Roberto Doss    Chief Complaint   Patient presents with   • Thoracic Aneurysm     New patient from [place]         Subjective     History of Present Illness  Mr. Magallon is a 62-year-old male who presents with bicuspid aortic valve, moderate aortic stenosis mild to moderate aortic insufficiency and ascending aortic aneurysm.  He first found out about his bicuspid aortic valve incidentally after a murmur was heard in college.  He has been seen on and off for this by different cardiologist.  He was last seen in [place] at the [facility] [place] while he lived there and was followed there for aortic valvular disease.  On his echocardiograms has been noted that his aortic root was enlarged.  With this he recently underwent CT scan of the chest and wanted to establish care with me with regards to his aneurysm and bicuspid valve.  He is followed here in [place] since moving here by Dr. Monteiro.  In review he first noted stenosis of the valve on aortic echocardiogram in 2019 and this is worsened to moderate to severe now.  He is very active for his age.  He worked previously as a  and now farms.  He is a never smoker.  He has never had surgery on his heart lungs or chest.  He has no history of cancer.  He has never had a TIA or stroke.  Today his blood pressure is 139/93 and typically it runs in the 130s over low 80s.        Review of Systems   Constitutional: Negative for activity change, fatigue and unexpected weight change.   Respiratory: Negative for chest tightness and shortness of breath.    Cardiovascular: Negative for chest pain and leg swelling.        A complete review of systems was performed, is negative except stated above.    Past Medical History:   Diagnosis Date   • Aortic aneurysm (HCC)    • Bicuspid aortic valve    • Cancer (HCC)     skin cancer   • Glaucoma    • LVH (left ventricular  hypertrophy) 10/29/2020   • Mixed hyperlipidemia 10/29/2020   • Nonrheumatic aortic valve insufficiency    • Nonrheumatic aortic valve stenosis 04/13/2022   • Primary hypertension 04/13/2022     Past Surgical History:   Procedure Laterality Date   • ANKLE SURGERY      placed joint back in place   • COLONOSCOPY     • EXTRACORPOREAL SHOCK WAVE LITHOTRIPSY (ESWL) Right 11/30/2022    Procedure: RIGHT EXTRACORPOREAL SHOCKWAVE LITHOTRIPSY;  Surgeon: Venkat Murphy MD;  Location: [facility];  Service: Urology;  Laterality: Right;     History reviewed. No pertinent family history.  Social History     Tobacco Use   • Smoking status: Never   • Smokeless tobacco: Never   Vaping Use   • Vaping Use: Never used   Substance Use Topics   • Alcohol use: Yes     Comment: rarely   • Drug use: Never     Current Outpatient Medications   Medication Sig Dispense Refill   • aspirin 81 MG EC tablet Take 81 mg by mouth Daily.     • bimatoprost (LUMIGAN) 0.01 % ophthalmic drops Administer 1 drop to both eyes Every Night.     • losartan (COZAAR) 25 MG tablet TAKE 1 TABLET BY MOUTH EVERY DAY 90 tablet 3   • simvastatin (ZOCOR) 40 MG tablet TAKE 1 TABLET BY MOUTH EVERY DAY AT NIGHT 90 tablet 4   • tamsulosin (FLOMAX) 0.4 MG capsule 24 hr capsule Take 1 capsule by mouth Daily. 30 capsule 0   • timolol (TIMOPTIC) 0.5 % ophthalmic solution Administer 1 drop to both eyes 2 (Two) Times a Day.     • HYDROcodone-acetaminophen (NORCO) 7.5-325 MG per tablet Take 1 tablet by mouth Every 6 (Six) Hours As Needed for Severe Pain (postop pain). 15 tablet 0   • ondansetron ODT (ZOFRAN-ODT) 4 MG disintegrating tablet Place 1 tablet on the tongue Every 6 (Six) Hours As Needed for Nausea. 6 tablet 1     No current facility-administered medications for this visit.     Allergies:  Patient has no known allergies.    Objective      Vital Signs  Visit Vitals  /93 (BP Location: Right arm, Patient Position: Sitting, Cuff Size: Adult)   Pulse 64   Ht 189 cm  "(74.41\")   Wt 102 kg (224 lb 12.8 oz)   SpO2 97%   BMI 28.55 kg/m²         Physical Exam  Constitutional:       General: He is not in acute distress.     Appearance: He is well-developed. He is not diaphoretic.   HENT:      Head: Normocephalic and atraumatic.      Right Ear: External ear normal.      Left Ear: External ear normal.   Eyes:      General:         Right eye: No discharge.         Left eye: No discharge.      Pupils: Pupils are equal, round, and reactive to light.   Neck:      Vascular: No JVD.      Trachea: No tracheal deviation.   Cardiovascular:      Rate and Rhythm: Normal rate and regular rhythm.      Heart sounds: Normal heart sounds. No murmur heard.  Pulmonary:      Effort: Pulmonary effort is normal. No respiratory distress.      Breath sounds: Normal breath sounds. No stridor. No wheezing.   Abdominal:      General: There is no distension.      Palpations: Abdomen is soft.      Tenderness: There is no abdominal tenderness. There is no guarding.   Musculoskeletal:         General: No tenderness or deformity. Normal range of motion.      Cervical back: Normal range of motion and neck supple.   Skin:     General: Skin is warm and dry.      Capillary Refill: Capillary refill takes less than 2 seconds.      Coloration: Skin is not pale.      Findings: No erythema or rash.   Neurological:      Mental Status: He is alert and oriented to person, place, and time.      Motor: No abnormal muscle tone.      Coordination: Coordination normal.   Psychiatric:         Behavior: Behavior normal.         Thought Content: Thought content normal.         Judgment: Judgment normal.         Results Review:   WBC   Date Value Ref Range Status   11/23/2022 7.91 3.40 - 10.80 10*3/mm3 Final   12/02/2019 6.23 3.70 - 11.00 k/uL Final     RBC   Date Value Ref Range Status   11/23/2022 4.54 4.14 - 5.80 10*6/mm3 Final   12/02/2019 4.77 4.20 - 6.00 m/uL Final     Hemoglobin   Date Value Ref Range Status   11/23/2022 12.9 (L) " 13.0 - 17.7 g/dL Final   12/02/2019 14.0 13.0 - 17.0 g/dL Final     Hematocrit   Date Value Ref Range Status   11/23/2022 40.9 37.5 - 51.0 % Final   12/02/2019 43.5 39.0 - 51.0 % Final     MCV   Date Value Ref Range Status   11/23/2022 90.1 79.0 - 97.0 fL Final   12/02/2019 91.2 80.0 - 100.0 fL Final     MCH   Date Value Ref Range Status   11/23/2022 28.4 26.6 - 33.0 pg Final   12/02/2019 29.4 26.0 - 34.0 pG Final     MCHC   Date Value Ref Range Status   11/23/2022 31.5 31.5 - 35.7 g/dL Final   12/02/2019 32.2 30.5 - 36.0 g/dL Final     RDW   Date Value Ref Range Status   11/23/2022 12.5 12.3 - 15.4 % Final   12/02/2019 12.3 11.5 - 15.0 % Final     RDW-SD   Date Value Ref Range Status   11/23/2022 41.3 37.0 - 54.0 fl Final     MPV   Date Value Ref Range Status   11/23/2022 11.2 6.0 - 12.0 fL Final   12/02/2019 12.2 9.0 - 12.7 fL Final     Platelets   Date Value Ref Range Status   11/23/2022 186 140 - 450 10*3/mm3 Final   12/02/2019 168 150 - 400 k/uL Final     Neutrophil %   Date Value Ref Range Status   12/05/2020 82.4 (H) 42.7 - 76.0 % Final     Lymphocyte %   Date Value Ref Range Status   12/05/2020 13.2 (L) 19.6 - 45.3 % Final     Monocyte %   Date Value Ref Range Status   12/05/2020 3.6 (L) 5.0 - 12.0 % Final     Eosinophil %   Date Value Ref Range Status   12/05/2020 0.2 (L) 0.3 - 6.2 % Final     Basophil %   Date Value Ref Range Status   12/05/2020 0.2 0.0 - 1.5 % Final     Immature Grans %   Date Value Ref Range Status   12/05/2020 0.4 0.0 - 0.5 % Final     Neutrophils, Absolute   Date Value Ref Range Status   12/05/2020 9.09 (H) 1.70 - 7.00 10*3/mm3 Final     Lymphocytes, Absolute   Date Value Ref Range Status   12/05/2020 1.45 0.70 - 3.10 10*3/mm3 Final     Monocytes, Absolute   Date Value Ref Range Status   12/05/2020 0.40 0.10 - 0.90 10*3/mm3 Final     Eosinophils, Absolute   Date Value Ref Range Status   12/05/2020 0.02 0.00 - 0.40 10*3/mm3 Final     Basophils, Absolute   Date Value Ref Range Status    12/05/2020 0.02 0.00 - 0.20 10*3/mm3 Final     Immature Grans, Absolute   Date Value Ref Range Status   12/05/2020 0.04 0.00 - 0.05 10*3/mm3 Final     nRBC   Date Value Ref Range Status   12/05/2020 0.0 0.0 - 0.2 /100 WBC Final     Glucose   Date Value Ref Range Status   11/23/2022 110 (H) 65 - 99 mg/dL Final     Sodium   Date Value Ref Range Status   11/23/2022 141 136 - 145 mmol/L Final   12/02/2019 142 136 - 144 mmol/L Final     Potassium   Date Value Ref Range Status   11/23/2022 4.1 3.5 - 5.2 mmol/L Final   12/02/2019 5.0 3.7 - 5.1 mmol/L Final     CO2   Date Value Ref Range Status   11/23/2022 28.0 22.0 - 29.0 mmol/L Final   12/02/2019 25 22 - 30 mmol/L Final     Chloride   Date Value Ref Range Status   11/23/2022 105 98 - 107 mmol/L Final   12/02/2019 105 97 - 105 mmol/L Final     Anion Gap   Date Value Ref Range Status   11/23/2022 8.0 5.0 - 15.0 mmol/L Final   12/02/2019 12 9 - 18 mmol/L Final     Creatinine   Date Value Ref Range Status   11/23/2022 1.21 0.76 - 1.27 mg/dL Final   11/16/2022 1.20 0.60 - 1.30 mg/dL Final     Comment:     Serial Number: [id number]:  [id number]   12/02/2019 1.20 0.73 - 1.22 mg/dL Final     BUN   Date Value Ref Range Status   11/23/2022 18 8 - 23 mg/dL Final   12/02/2019 25 (H) 9 - 24 mg/dL Final     BUN/Creatinine Ratio   Date Value Ref Range Status   11/23/2022 14.9 7.0 - 25.0 Final     Calcium   Date Value Ref Range Status   11/23/2022 9.5 8.6 - 10.5 mg/dL Final   12/02/2019 9.4 8.5 - 10.2 mg/dL Final     eGFR Non  Amer   Date Value Ref Range Status   12/05/2020 66 >60 mL/min/1.73 Final     Alkaline Phosphatase   Date Value Ref Range Status   12/05/2020 64 39 - 117 U/L Final   12/02/2019 51 38 - 113 U/L Final     Total Protein   Date Value Ref Range Status   12/05/2020 6.6 6.0 - 8.5 g/dL Final   12/02/2019 6.4 6.3 - 8.0 g/dL Final     ALT (SGPT)   Date Value Ref Range Status   12/05/2020 18 1 - 41 U/L Final   12/02/2019 20 10 - 54 U/L Final     AST (SGOT)   Date  Value Ref Range Status   12/05/2020 21 1 - 40 U/L Final     Comment:     Slight hemolysis detected by analyzer. Results may be affected.   12/02/2019 21 14 - 40 U/L Final     Total Bilirubin   Date Value Ref Range Status   12/05/2020 0.3 0.0 - 1.2 mg/dL Final   12/02/2019 0.3 0.2 - 1.3 mg/dL Final     Albumin   Date Value Ref Range Status   12/05/2020 4.80 3.50 - 5.20 g/dL Final   12/02/2019 4.5 3.9 - 4.9 g/dL Final     Globulin   Date Value Ref Range Status   12/05/2020 1.8 gm/dL Final        I reviewed the patient's clinical results and discussed with patient.    CT Chest:  Addendum    Addendum:  1. A review of the CT scan of the chest with particular attention to the  aortic root was performed. The hyperdense object at the root of the  aorta represent calcification of the aortic root and not the prosthetic  valve as described previously.  This report was finalized on 11/17/2022 09:05 by Dr. Jere Stanford MD.   Addended by Jere Stanford MD on 11/17/2022  9:05 AM     Study Result    Narrative & Impression   EXAMINATION: CT CHEST W CONTRAST DIAGNOSTIC-      11/16/2022 7:27 AM CST     HISTORY: Aortic aneurysm, known or suspected; I10-Essential (primary)  hypertension; Q23.1-Congenital insufficiency of aortic valve     In order to have a CT radiation dose as low as reasonably achievable  Automated Exposure Control was utilized for adjustment of the mA and/or  KV according to patient size.     DLP in mGycm= 163     The CT scan of the chest are performed after intravenous contrast  enhancement.     Images are acquired in axial plane with subsequent reconstruction in  coronal and sagittal planes.     There is no previous study for comparison.     There are atheromatous changes of the thoracic aorta. There is  aneurysmal dilatation of the ascending thoracic aorta which measures 4.9  x 4.5 cm above the aortic root. No dissection. Aortic valve prosthesis  is in place. Mildly 2 moderate atheromatous changes of  coronary arteries  are seen.     There is poor opacification of the pulmonary arteries are evaluation of  any filling defects or emboli.     There is no evidence of mediastinal or hilar mass or lymphadenopathy.     Limited visualized soft tissues of the neck are unremarkable. No mass or  lymphadenopathy. There is no axillary lymphadenopathy.     The lungs are poorly expanded.     No discrete lung nodules are a mass.     No infiltrate or focal consolidation.     The tracheobronchial structures as visualized are normal. No  endobronchial abnormality or lesion.     The limited visualized abdominal organs are unremarkable.     No acute bony abnormality.     IMPRESSION:  1. Ascending thoracic aortic aneurysm. No dissection.  2. No evidence of a mass or infiltrate.    This report was finalized on 11/16/2022 08:47 by Dr. Jere Stanford MD.         ECHO:  Interpretation Summary    • Left ventricular ejection fraction appears to be 61 - 65%. Left ventricular systolic function is normal.  • Left ventricular wall thickness is consistent with mild concentric hypertrophy.  • Left ventricular diastolic function was normal.  • Moderate to severe aortic valve stenosis is present.  • Normal global longitudinal LV strain (GLS) = -17.0%.  • Estimated right ventricular systolic pressure from tricuspid regurgitation is normal (<35 mmHg).  • Similar to prior echo dated 04/5/22  Mean gradient 30, aortic valve area 1.26    I personally reviewed images of following exams, the following is my interpretation:    CT Chest: Dilated aortic root and ascending aorta, measure the ascending aorta to be 4.7 cm in maximum dimension in the mid ascending aorta, measured the aortic root to be 4.2 cm in maximum dimension, bovine arch branching pattern, no evidence of dissection IMH or ERNST    I reviewed the echocardiogram, it shows moderate to severe aortic stenosis but most indices are in the moderate range with severe calcification a limited mobility of  leaflets, mild AI    Assessment & Plan     Mr. Magallon is a 62year old male who presents with an aortic root aneurysm, ascending aortic aneurysm, bicuspid aortic valve with moderate aortic stenosis and mild aortic insufficiency.  He is currently asymptomatic from his valvular heart disease and very active.  I measure the aneurysm to be 4.7 cm in size at the mid a sending aorta, his aortic root is 4.2 cm in maximum dimension in his proximal arch/distal ascending measures 3.8 cm.  He is an excellent surgical candidate for aortic root and Braden arch replacement.    We discussed that essentially he has 2 problems, his aortic valve disease with bicuspid aortic valve and ascending aortic and aortic root aneurysm.  From a valve standpoint, we discussed the progressive nature of aortic stenosis currently he is in the moderate range based on hemodynamic parameters.  I suspect this will worsen over time and he will eventually need surgical treatment.  We discussed the natural history of ascending aortic aneurysms as well as treatment options.  We agreed that for now the best course of action is continued surveillance.  We discussed modifiable factors that can prevent aneurysm expansion including blood pressure control and abstinence from smoking.    We discussed the risk of this aneurysm size considerations for repair and the risk of the operative procedure that would be required to treat the aneurysm.  Currently the risk of surgery outweigh the benefits of prevention of aortic problems.  My recommendation would be to continue to survey the aneurysm with CT scans.  We discussed the signs and symptoms of an acute aortic emergency and the patient should immediately present to the emergency room if that were to happen.  We also discussed the importance of tight blood pressure control.  We discussed avoiding strenuous activity that would put undue stress on the aneurysm.    Once he meets indication for surgery either via aneurysm  size of 5 cm or severe aortic valve disease with symptoms we will proceed with surgery with aortic root and Braden arch replacement.  I described the operative conduct of surgical repair as well as preoperative expectations, operative expectations and postoperative expectations.      I will plan on seeing Mr. Magallon back in 3 months with a repeat CTA.  Thank you for trusting me with the care of Mr. Magallon.  Please do not hesitate to call with questions or concerns.    Ramon Kumar MD  Cardiothoracic Surgeon    I spent a total of 63 minutes today before during and after visit reviewing records educating the patient about their disease and ordering test.

## 2022-12-21 ENCOUNTER — PATIENT ROUNDING (BHMG ONLY) (OUTPATIENT)
Dept: CARDIAC SURGERY | Facility: CLINIC | Age: 62
End: 2022-12-21

## 2022-12-21 NOTE — PROGRESS NOTES
December 21, 2022    Hello, may I speak with Melecio Magallon?    My name is Alexandra Sears.    I am  with [facility]  [facility] CARDIOTHORACIC SURGERY  [address], [address]  [place] [facility] [address] [phone].    Before we get started may I verify your date of birth? 1960    I am calling to officially welcome you to our practice and ask about your recent visit. Is this a good time to talk? Yes    Tell me about your visit with us. What things went well?  Dr. Kumar answered questions and was very helpful. All of the staff was very nice.       We're always looking for ways to make our patients' experiences even better. Do you have recommendations on ways we may improve?  NO    Overall were you satisfied with your first visit to our practice? YES        I appreciate you taking the time to speak with me today. Is there anything else I can do for you? NO      Thank you, and have a great day.

## 2022-12-27 DIAGNOSIS — N20.0 KIDNEY STONE: ICD-10-CM

## 2022-12-27 RX ORDER — TAMSULOSIN HYDROCHLORIDE 0.4 MG/1
CAPSULE ORAL
Qty: 30 CAPSULE | Refills: 0 | Status: SHIPPED | OUTPATIENT
Start: 2022-12-27 | End: 2023-03-30

## 2023-01-10 ENCOUNTER — TELEPHONE (OUTPATIENT)
Dept: UROLOGY | Facility: CLINIC | Age: 63
End: 2023-01-10
Payer: COMMERCIAL

## 2023-01-10 DIAGNOSIS — N20.0 KIDNEY STONE: Primary | ICD-10-CM

## 2023-01-10 NOTE — TELEPHONE ENCOUNTER
----- Message from Herb Gross sent at 1/10/2023 11:17 AM CST -----  Could you put in the renal US please?  I'll get both appts scheduled today.  Thanks      ----- Message -----  From: Janel West CMA  Sent: 1/9/2023   1:59 PM CST  To: [facility]  [facility]      ----- Message -----  From: Venkat Murphy MD  Sent: 1/9/2023  11:37 AM CST  To: Janel West CMA    I need to see him mid to late March with preclinic renal ultrasound, thank you    ----- Message -----  From: Janel West CMA  Sent: 1/9/2023  10:20 AM CST  To: Venkat Murphy MD    When do we need to see this pt back? And any imagining

## 2023-01-20 ENCOUNTER — PATIENT ROUNDING (BHMG ONLY) (OUTPATIENT)
Dept: CARDIAC SURGERY | Facility: CLINIC | Age: 63
End: 2023-01-20
Payer: COMMERCIAL

## 2023-01-20 NOTE — PROGRESS NOTES
A [place] message has been sent to the patient for PATIENT ROUNDING with [facility] Cardiothoracic Surgery.

## 2023-03-14 ENCOUNTER — HOSPITAL ENCOUNTER (OUTPATIENT)
Dept: ULTRASOUND IMAGING | Facility: HOSPITAL | Age: 63
Discharge: HOME | End: 2023-03-14
Admitting: UROLOGY
Payer: COMMERCIAL

## 2023-03-14 DIAGNOSIS — N20.0 KIDNEY STONE: ICD-10-CM

## 2023-03-14 PROCEDURE — 76775 US EXAM ABDO BACK WALL LIM: CPT

## 2023-03-15 NOTE — PROGRESS NOTES
Subjective    Mr. Magallon is 62 y.o. male    Chief Complaint: Kidney stone    History of Present Illness       62-year-old male established patient follow-up after right ESWL 11/30/2022 for 6 mm right upper pole kidney stone.  He did well after procedure and denies any flank pain, hematuria, or bothersome LUTS.  Bilateral renal ultrasound done 3/14/2023 reviewed by me with the patient today showing normal kidneys bilaterally, no stone or hydronephrosis evident.  He does have an enlarged prostate on the ultrasound.  He has no bother from his enlarged prostate.    He gets his PSAs checked with Dr. Doss.  UA today is clear.     I independently visualized and reviewed the patient's prior imaging studies today in clinic and discussed the imaging findings with the patient.    The following portions of the patient's history were reviewed and updated as appropriate: allergies, current medications, past family history, past medical history, past social history, past surgical history and problem list.    Review of Systems      Current Outpatient Medications:   •  aspirin 81 MG EC tablet, Take 1 tablet by mouth Daily., Disp: , Rfl:   •  bimatoprost (LUMIGAN) 0.01 % ophthalmic drops, Administer 1 drop to both eyes Every Night., Disp: , Rfl:   •  losartan (COZAAR) 25 MG tablet, TAKE 1 TABLET BY MOUTH EVERY DAY, Disp: 90 tablet, Rfl: 3  •  simvastatin (ZOCOR) 40 MG tablet, TAKE 1 TABLET BY MOUTH EVERY DAY AT NIGHT, Disp: 90 tablet, Rfl: 4  •  timolol (TIMOPTIC) 0.5 % ophthalmic solution, Administer 1 drop to both eyes 2 (Two) Times a Day., Disp: , Rfl:   •  tamsulosin (FLOMAX) 0.4 MG capsule 24 hr capsule, TAKE 1 CAPSULE BY MOUTH EVERY DAY, Disp: 30 capsule, Rfl: 0    Past Medical History:   Diagnosis Date   • Aortic aneurysm (HCC)    • Bicuspid aortic valve    • Cancer (HCC)     skin cancer   • Coronary artery disease 2018 / 2022    Aortic Stenosis / aneurysm   • Glaucoma    • Kidney stone Nov 2020   • LVH (left ventricular  "hypertrophy) 10/29/2020   • Mixed hyperlipidemia 10/29/2020   • Nonrheumatic aortic valve insufficiency    • Nonrheumatic aortic valve stenosis 04/13/2022   • Primary hypertension 04/13/2022       Past Surgical History:   Procedure Laterality Date   • ANKLE SURGERY      placed joint back in place   • COLONOSCOPY     • EXTRACORPOREAL SHOCK WAVE LITHOTRIPSY (ESWL) Right 11/30/2022    Procedure: RIGHT EXTRACORPOREAL SHOCKWAVE LITHOTRIPSY;  Surgeon: Venkat Murphy MD;  Location: [facility];  Service: Urology;  Laterality: Right;   • LITHOTRIPSY  November 2022       Social History     Socioeconomic History   • Marital status:    Tobacco Use   • Smoking status: Never   • Smokeless tobacco: Never   Vaping Use   • Vaping Use: Never used   Substance and Sexual Activity   • Alcohol use: Yes     Comment: Occasional use of alcohol   • Drug use: Never   • Sexual activity: Yes     Partners: Female       Family History   Problem Relation Age of Onset   • Cancer Father         Leukemia   • Heart disease Maternal Grandfather    • Cancer Paternal Grandfather         Lung Cancer       Objective    Temp 97.6 °F (36.4 °C)   Ht 190.5 cm (75\")   Wt 97.3 kg (214 lb 6.4 oz)   BMI 26.80 kg/m²     Physical Exam        Results for orders placed or performed in visit on 03/22/23   POC Urinalysis Dipstick, Multipro    Specimen: Urine   Result Value Ref Range    Color Yellow Yellow, Straw, Dark Yellow, Autumn    Clarity, UA Clear Clear    Glucose, UA Negative Negative mg/dL    Bilirubin Negative Negative    Ketones, UA Negative Negative    Specific Gravity  1.030 1.005 - 1.030    Blood, UA Trace (A) Negative    pH, Urine 5.5 5.0 - 8.0    Protein, POC Negative Negative mg/dL    Urobilinogen, UA Normal Normal, 0.2 E.U./dL    Nitrite, UA Negative Negative    Leukocytes Negative Negative     Assessment and Plan    Diagnoses and all orders for this visit:    1. History of kidney stones (Primary)  -     POC Urinalysis Dipstick, " Multipro      Doing well after right ESWL, appears stone free by renal ultrasound.  He has asymptomatic prostate enlargement.  He will continue to get his PSAs checked with Dr. Doss.  He will follow-up with me on an as-needed basis.        This document has been signed by MORRO Murphy MD on March 22, 2023 18:16 CDT

## 2023-03-22 ENCOUNTER — OFFICE VISIT (OUTPATIENT)
Dept: UROLOGY | Facility: CLINIC | Age: 63
End: 2023-03-22
Payer: COMMERCIAL

## 2023-03-22 VITALS — HEIGHT: 75 IN | TEMPERATURE: 97.6 F | BODY MASS INDEX: 26.66 KG/M2 | WEIGHT: 214.4 LBS

## 2023-03-22 DIAGNOSIS — Z87.442 HISTORY OF KIDNEY STONES: Primary | ICD-10-CM

## 2023-03-22 PROCEDURE — 81001 URINALYSIS AUTO W/SCOPE: CPT | Performed by: UROLOGY

## 2023-03-22 PROCEDURE — 99213 OFFICE O/P EST LOW 20 MIN: CPT | Performed by: UROLOGY

## 2023-03-29 ENCOUNTER — HOSPITAL ENCOUNTER (OUTPATIENT)
Dept: CT IMAGING | Facility: HOSPITAL | Age: 63
Discharge: HOME | End: 2023-03-29
Admitting: SURGERY
Payer: COMMERCIAL

## 2023-03-29 DIAGNOSIS — I71.20 THORACIC AORTIC ANEURYSM WITHOUT RUPTURE, UNSPECIFIED PART: ICD-10-CM

## 2023-03-29 LAB — CREAT BLDA-MCNC: 1.1 MG/DL (ref 0.6–1.3)

## 2023-03-29 PROCEDURE — 82565 ASSAY OF CREATININE: CPT

## 2023-03-29 PROCEDURE — 71275 CT ANGIOGRAPHY CHEST: CPT

## 2023-03-29 PROCEDURE — 25510000001 IOPAMIDOL PER 1 ML: Performed by: SURGERY

## 2023-03-29 RX ORDER — LOSARTAN POTASSIUM 25 MG/1
TABLET ORAL
Qty: 90 TABLET | Refills: 3 | Status: SHIPPED | OUTPATIENT
Start: 2023-03-29

## 2023-03-29 RX ADMIN — IOPAMIDOL 100 ML: 755 INJECTION, SOLUTION INTRAVENOUS at 07:48

## 2023-03-30 ENCOUNTER — OFFICE VISIT (OUTPATIENT)
Dept: CARDIAC SURGERY | Facility: CLINIC | Age: 63
End: 2023-03-30
Payer: COMMERCIAL

## 2023-03-30 VITALS
BODY MASS INDEX: 26.56 KG/M2 | DIASTOLIC BLOOD PRESSURE: 79 MMHG | WEIGHT: 213.6 LBS | HEIGHT: 75 IN | SYSTOLIC BLOOD PRESSURE: 118 MMHG | OXYGEN SATURATION: 95 % | HEART RATE: 58 BPM

## 2023-03-30 DIAGNOSIS — I71.20 THORACIC AORTIC ANEURYSM WITHOUT RUPTURE, UNSPECIFIED PART: Primary | ICD-10-CM

## 2023-03-30 PROCEDURE — 99214 OFFICE O/P EST MOD 30 MIN: CPT | Performed by: SURGERY

## 2023-03-30 NOTE — PROGRESS NOTES
"    [facility] Cardiothoracic Surgery  PROGRESS NOTE   CC:  Ascending aortic aneurysm. Bicuspid aortic valve with aortic stenosis.    Subjective:   Melecio Magallon is a 62-year-old male patient who presents today for follow-up visit. He is accompanied by his wife.    Mr. Magallon is doing well. He has not experienced any problems since last visit. He has not been restricted in his daily activities. He denies any chest pain, shortness of breath, or other cardiac symptoms. The results of his recent CT scan were reviewed with him today. His wife queries as to the mention of nodules on his CT scan. He did have occupational exposure to chemicals while at work. He has an appointment with Dr. Monteiro's PA on 05/01/2023. He thinks he is to repeat an echocardiogram in 1 year.     His blood pressure has typically been good, His blood pressure usually averages in the range of 120/70 mmHg. His blood pressure today in-office was slightly lower at 118/79 mmHg.     The patient denies any new diagnoses or medications.    The patient has never smoked.        ROS:   No fevers, chills or recent illness.    Objective:      /79 (BP Location: Right arm, Patient Position: Sitting, Cuff Size: Adult)   Pulse 58   Ht 190.5 cm (75\")   Wt 96.9 kg (213 lb 9.6 oz)   SpO2 95%   BMI 26.70 kg/m²         PE:  Vitals:    03/30/23 0801   BP: 118/79   Pulse: 58   SpO2: 95%       GENERAL: NAD, resting comfortably, normal color  CARDIOVASCULAR: regular, regular rate, sinus  PULMONARY: Normal bilateral breath sounds, no labored breathing  ABDOMEN: soft, nontender/nondistended  EXTREMITIES: mild peripheral edema, normal pulses, normal ROM        Lab Results (last 72 hours)     ** No results found for the last 72 hours. **          CT angiogram chest performed 03/29/2023  Comparison is made with the previous study dated 11/16/2022.     The atheromatous changes of the thoracic aorta is reidentified.  Aneurysmal dilatation of the " ascending thoracic aorta persists. It  measures 4.5 x 4.5 cm above the aortic root. It previously measured 4.9  x 4.5 cm at the same level. No dissection. The aortic lumen in the  proximal/anterior aortic arch measures 3.4 cm. It measures 3.1 cm at the  distal/posterior aortic arch. The proximal descending thoracic aorta  measures 3.2 cm in diameter. An aortic valve prosthesis in place.     Mild to moderate atheromatous changes of coronary arteries are seen.     There is normal opacification of the normal-sized pulmonary arteries and  branches. No filling defects in the visualized/opacified pulmonary  arterial bed.     There is no evidence of mediastinal or hilar mass or lymphadenopathy.     Limited visualized soft tissues of the neck are unremarkable. The  thyroid gland is suboptimally visualized and not well evaluated.     There is no axillary lymphadenopathy.     The lungs are moderately well-expanded.     Several pleural-based and subpleural nodule in the left upper lung  similar to the previous study. A nodule located posteriorly in the right  upper lung, image #24 and series 6 is similar to the previous study. No  new nodules are seen.     No infiltrate. No foci of consolidation.     Central airway is patent. No endobronchial abnormality or a lesion.     The limited visualized abdomen is unremarkable.     Images are reviewed in bone window show moderate chronic degenerative  changes of the thoracic spine. No focal bony lesion is seen.     IMPRESSION:  1. Evidence of ascending thoracic aortic aneurysm which appears smaller  than the previous study in November 2022. The measurements are given  above. No dissection.  2. Tiny pleural are subpleural nodule in the upper lobes bilaterally are  similar to the previous study and may represent small foci of chronic  inflammatory process/nodules/granulomas. Another follow-up examination  in 12-18 months may be obtained to ensure stability.    I personally interpreted the  CT scan of the chest, the following is my interpretation:   Ascending aorta measures 4.7 cm in maximum dimension, this is unchanged compared to the CT scan from 11/2022, distal ascending proximal arch measures 3.8 cm and there is a normal branching pattern to the aortic arch, there is severe calcifications of the aortic valve and the aortic root measures 4.2 cm in maximum dimension. No evidence of ERNST, IMH, or dissection.         Assessment & Plan     Mr. Magallon is a 62-year-old male who presents with an aortic root aneurysm, ascending aortic aneurysm, bicuspid aortic valve with moderate aortic stenosis and mild aortic insufficiency. He is asymptomatic from his valvular disease and very active. Today, he returns with a 3-month follow-up and I measured his aneurysm to be unchanged since November of last year with the maximum dimension being 4.7 cm. He is an excellent candidate for aortic root and hemiarch replacement.    We discussed again the natural history of aneurysmal disease such as his in combination with bicuspid disease. His surgical treatment will be aortic root replacement with a bio Bentall's and a hemiarch replacement, we discussed this today. If he begins to have symptoms from his valve or his aneurysm enlarges to 5 cm, we will proceed with surgery. I discussed with him and his wife that it is likely we see problems with his valvular disease first, which will prompt us for surgery. We agreed though that for now, the best course of action is continued surveillance. His blood pressure has been well controlled and he has never smoked.     We discussed the signs and symptoms of acute aortic emergency and the importance of presenting to the emergency department if this were to occur. We also discussed signs and symptoms of worsening aortic stenosis and the importance of letting us know if this happens.     I will see Mr. Lora back in 12 months with a repeat CTA. Thank you for trusting me in the care of  Mr. Magallon. Please do not hesitate to call with questions or concerns.        Ramon Kumar MD   Cardiothoracic Surgeon    Transcribed from ambient dictation for Ramon Kumra MD by Connie Jaramillo.  03/30/23   10:56 CDT    Patient or patient representative verbalized consent to the visit recording.  I have personally performed the services described in this document as transcribed by the above individual, and it is both accurate and complete.

## 2023-03-30 NOTE — LETTER
April 2, 2023       No Recipients    Patient: Melecio Magallon   YOB: 1960   Date of Visit: 3/30/2023       Dear Roberto Doss MD,    Melecio Magallon was in my office today. Below are the relevant portions of my assessment and plan of care.    Mr. Magallon is a 62-year-old male who presents with an aortic root aneurysm, ascending aortic aneurysm, bicuspid aortic valve with moderate aortic stenosis and mild aortic insufficiency. He is asymptomatic from his valvular disease and very active. Today, he returns with a 3-month follow-up and I measured his aneurysm to be unchanged since November of last year with the maximum dimension being 4.7 cm. He is an excellent candidate for aortic root and hemiarch replacement.    We discussed again the natural history of aneurysmal disease such as his in combination with bicuspid disease. His surgical treatment will be aortic root replacement with a bio Bentall's and a hemiarch replacement, we discussed this today. If he begins to have symptoms from his valve or his aneurysm enlarges to 5 cm, we will proceed with surgery. I discussed with him and his wife that it is likely we see problems with his valvular disease first, which will prompt us for surgery. We agreed though that for now, the best course of action is continued surveillance. His blood pressure has been well controlled and he has never smoked.     We discussed the signs and symptoms of acute aortic emergency and the importance of presenting to the emergency department if this were to occur. We also discussed signs and symptoms of worsening aortic stenosis and the importance of letting us know if this happens.     I will see Mr. Lora back in 12 months with a repeat CTA. Thank you for trusting me in the care of Mr. Magallon. Please do not hesitate to call with questions or concerns.         Sincerely,        Ramon Kumar MD        CC:   No Recipients

## 2023-05-01 ENCOUNTER — OFFICE VISIT (OUTPATIENT)
Dept: CARDIOLOGY | Facility: CLINIC | Age: 63
End: 2023-05-01
Payer: COMMERCIAL

## 2023-05-01 VITALS
DIASTOLIC BLOOD PRESSURE: 70 MMHG | SYSTOLIC BLOOD PRESSURE: 120 MMHG | HEIGHT: 75 IN | WEIGHT: 210 LBS | OXYGEN SATURATION: 95 % | BODY MASS INDEX: 26.11 KG/M2 | HEART RATE: 64 BPM

## 2023-05-01 DIAGNOSIS — E78.2 MIXED HYPERLIPIDEMIA: ICD-10-CM

## 2023-05-01 DIAGNOSIS — I71.20 THORACIC AORTIC ANEURYSM WITHOUT RUPTURE, UNSPECIFIED PART: ICD-10-CM

## 2023-05-01 DIAGNOSIS — I35.0 NONRHEUMATIC AORTIC VALVE STENOSIS: Primary | ICD-10-CM

## 2023-05-01 DIAGNOSIS — I25.10 CORONARY ARTERY CALCIFICATION SEEN ON CT SCAN: ICD-10-CM

## 2023-05-01 DIAGNOSIS — I10 PRIMARY HYPERTENSION: ICD-10-CM

## 2023-05-01 PROCEDURE — 93000 ELECTROCARDIOGRAM COMPLETE: CPT | Performed by: NURSE PRACTITIONER

## 2023-05-01 PROCEDURE — 99213 OFFICE O/P EST LOW 20 MIN: CPT | Performed by: NURSE PRACTITIONER

## 2023-05-01 NOTE — PROGRESS NOTES
Subjective:     Encounter Date:05/01/2023      Patient ID: Melecio Magallon is a 63 y.o. male     Chief Complaint:  History of Present Illness  Patient presents today for routine follow up. Patient is followed for coronary artery disease seen on CT scan, hyperlipidemia, hypertension, bicuspid aortic valve, moderate to severe stenosis or thoracic aortic aneurysm. Overall patient is stable. Patient follows with Dr. Kumar for his aneurysm and aortic valve. Overall he is stable. He is active without limitation. Follows with Dr. Doss as his PCP. Reports BP is well controlled at home.     The following portions of the patient's history were reviewed and updated as appropriate: allergies, current medications, past medical history, past social history, past and problem list.    No Known Allergies    Current Outpatient Medications:   •  aspirin 81 MG EC tablet, Take 1 tablet by mouth Daily., Disp: , Rfl:   •  bimatoprost (LUMIGAN) 0.01 % ophthalmic drops, Administer 1 drop to both eyes Every Night., Disp: , Rfl:   •  losartan (COZAAR) 25 MG tablet, TAKE 1 TABLET BY MOUTH EVERY DAY, Disp: 90 tablet, Rfl: 3  •  simvastatin (ZOCOR) 40 MG tablet, TAKE 1 TABLET BY MOUTH EVERY DAY AT NIGHT, Disp: 90 tablet, Rfl: 4  •  timolol (TIMOPTIC) 0.5 % ophthalmic solution, Administer 1 drop to both eyes 2 (Two) Times a Day., Disp: , Rfl:     Social History     Socioeconomic History   • Marital status:    Tobacco Use   • Smoking status: Never   • Smokeless tobacco: Never   Vaping Use   • Vaping Use: Never used   Substance and Sexual Activity   • Alcohol use: Yes     Comment: Occasional use of alcohol   • Drug use: Never   • Sexual activity: Yes     Partners: Female       Review of Systems   Constitutional: Negative for chills, decreased appetite, fever, malaise/fatigue, weight gain and weight loss.   HENT: Negative for nosebleeds.    Eyes: Negative for visual disturbance.   Cardiovascular: Negative for chest pain, dyspnea on  exertion, leg swelling, near-syncope, orthopnea, palpitations, paroxysmal nocturnal dyspnea and syncope.   Respiratory: Negative for cough, hemoptysis, shortness of breath and snoring.    Endocrine: Negative for cold intolerance and heat intolerance.   Hematologic/Lymphatic: Negative for bleeding problem. Does not bruise/bleed easily.   Skin: Negative for rash.   Musculoskeletal: Negative for back pain and falls.   Gastrointestinal: Negative for abdominal pain, constipation, diarrhea, heartburn, melena, nausea and vomiting.   Genitourinary: Negative for hematuria.   Neurological: Negative for dizziness, headaches and light-headedness.   Psychiatric/Behavioral: Negative for altered mental status.   Allergic/Immunologic: Negative for persistent infections.              Objective:     Constitutional:       Appearance: Healthy appearance. Well-developed and not in distress.   Eyes:      Pupils: Pupils are equal, round, and reactive to light.   HENT:      Head: Normocephalic and atraumatic.   Neck:      Vascular: No carotid bruit or JVD.   Pulmonary:      Effort: Pulmonary effort is normal.      Breath sounds: Normal breath sounds.   Cardiovascular:      Normal rate. Regular rhythm.      Murmurs: There is a systolic murmur.   Pulses:     Intact distal pulses.   Edema:     Peripheral edema absent.   Abdominal:      General: Bowel sounds are normal.      Palpations: Abdomen is soft.   Musculoskeletal: Normal range of motion.      Cervical back: Normal range of motion and neck supple. Skin:     General: Skin is warm and dry.   Neurological:      Mental Status: Alert and oriented to person, place, and time.      Deep Tendon Reflexes: Reflexes are normal and symmetric.   Psychiatric:         Behavior: Behavior normal.         Thought Content: Thought content normal.         Judgment: Judgment normal.             ECG 12 Lead    Date/Time: 5/1/2023 11:23 AM  Performed by: Teo Pulido APRN  Authorized by: Teo Pulido,  "APRN   Comparison: compared with previous ECG from 11/23/2022  Similar to previous ECG  Rhythm: sinus bradycardia          /70   Pulse 64   Ht 190.5 cm (75\")   Wt 95.3 kg (210 lb)   SpO2 95%   BMI 26.25 kg/m²   Lab Review:   I have reviewed       No results found for: CHOL, CHLPL, TRIG, HDL, LDL, LDLDIRECT   Results for orders placed during the hospital encounter of 09/13/22    Adult Transthoracic Echo Complete w/ Color, Spectral and Contrast if necessary per protocol    Interpretation Summary  · Left ventricular ejection fraction appears to be 61 - 65%. Left ventricular systolic function is normal.  · Left ventricular wall thickness is consistent with mild concentric hypertrophy.  · Left ventricular diastolic function was normal.  · Moderate to severe aortic valve stenosis is present.  · Normal global longitudinal LV strain (GLS) = -17.0%.  · Estimated right ventricular systolic pressure from tricuspid regurgitation is normal (<35 mmHg).  · Similar to prior echo dated 04/5/22     Assessment:          Diagnosis Plan   1. Nonrheumatic aortic valve stenosis  Adult Transthoracic Echo Complete w/ Color, Spectral and Contrast if necessary per protocol      2. Thoracic aortic aneurysm without rupture, unspecified part  Adult Transthoracic Echo Complete w/ Color, Spectral and Contrast if necessary per protocol      3. Coronary artery calcification seen on CT scan        4. Mixed hyperlipidemia        5. Primary hypertension               Plan:       1. Moderate to Severe Aortic Stenosis with bicuspid valve. Also with ascending aortic aneurysm. Following with Dr. Kumar. Will order an echo to be done 1 year from September 2022.   2. Thoracic Aortic Aneurysm- following with Dr. Kumar. Discussed BP control. Follow up scan ordered.   3. Coronary Artery Calcification seen on CT. On aspirin and statin. Will request last lipid panel. No angina.   4. Mixed Hyperlipidemia- On Zocor. Will request last lipid panel.   5. " Hypertension- blood pressure controlled. Low salt diet.

## 2023-05-02 RX ORDER — ROSUVASTATIN CALCIUM 40 MG/1
40 TABLET, COATED ORAL DAILY
Qty: 30 TABLET | Refills: 11 | Status: SHIPPED | OUTPATIENT
Start: 2023-05-02

## 2023-06-19 ENCOUNTER — OFFICE VISIT (OUTPATIENT)
Dept: GASTROENTEROLOGY | Facility: CLINIC | Age: 63
End: 2023-06-19
Payer: COMMERCIAL

## 2023-06-19 VITALS
SYSTOLIC BLOOD PRESSURE: 124 MMHG | DIASTOLIC BLOOD PRESSURE: 84 MMHG | OXYGEN SATURATION: 100 % | TEMPERATURE: 96.8 F | HEART RATE: 49 BPM | HEIGHT: 75 IN | BODY MASS INDEX: 25.86 KG/M2 | WEIGHT: 208 LBS

## 2023-06-19 DIAGNOSIS — Z86.010 HISTORY OF ADENOMATOUS POLYP OF COLON: Primary | ICD-10-CM

## 2023-06-19 PROBLEM — Z86.0101 HISTORY OF ADENOMATOUS POLYP OF COLON: Status: ACTIVE | Noted: 2023-06-19

## 2023-06-19 PROCEDURE — S0285 CNSLT BEFORE SCREEN COLONOSC: HCPCS | Performed by: NURSE PRACTITIONER

## 2023-06-19 NOTE — PROGRESS NOTES
[facility] Gastroenterology    Primary Physician Roberto Doss MD    6/19/2023    Melecio Magallon   1960      Chief Complaint   Patient presents with    Colonoscopy       Subjective     HPI    Melecio Magallon is a 63 y.o. male who presents as a referral for preventative maintenance. He has no complaints of nausea or vomiting. No change in bowels. No wt loss. No BRBPR. No melena. No abdominal pain.             SCANNED - COLONOSCOPY (06/14/2018 00:00) by Dr. Darrel Menjivar , colon polyp x 1 , path tubular adenomatous. Recall 5 years.               Past Medical History:   Diagnosis Date    Aortic aneurysm     Bicuspid aortic valve     Cancer     skin cancer    Coronary artery disease 2018 / 2022    Aortic Stenosis / aneurysm    Glaucoma     Kidney stone Nov 2020    LVH (left ventricular hypertrophy) 10/29/2020    Mixed hyperlipidemia 10/29/2020    Nonrheumatic aortic valve insufficiency     Nonrheumatic aortic valve stenosis 04/13/2022    Primary hypertension 04/13/2022       Past Surgical History:   Procedure Laterality Date    ANKLE SURGERY      placed joint back in place    COLONOSCOPY  06/17/2011    normal    EXTRACORPOREAL SHOCK WAVE LITHOTRIPSY (ESWL) Right 11/30/2022    Procedure: RIGHT EXTRACORPOREAL SHOCKWAVE LITHOTRIPSY;  Surgeon: Venkat Murphy MD;  Location: [facility] OR;  Service: Urology;  Laterality: Right;    LITHOTRIPSY  November 2022       Outpatient Medications Marked as Taking for the 6/19/23 encounter (Office Visit) with Liz Barker APRN   Medication Sig Dispense Refill    aspirin 81 MG EC tablet Take 1 tablet by mouth Daily.      bimatoprost (LUMIGAN) 0.01 % ophthalmic drops Administer 1 drop to both eyes Every Night.      losartan (COZAAR) 25 MG tablet TAKE 1 TABLET BY MOUTH EVERY DAY 90 tablet 3    rosuvastatin (CRESTOR) 40 MG tablet Take 1 tablet by mouth Daily. 30 tablet 11    timolol (TIMOPTIC) 0.5 % ophthalmic solution Administer 1 drop to both eyes 2 (Two)  Times a Day.         No Known Allergies    Social History     Socioeconomic History    Marital status:    Tobacco Use    Smoking status: Never    Smokeless tobacco: Never   Vaping Use    Vaping Use: Never used   Substance and Sexual Activity    Alcohol use: Yes     Comment: Occasional use of alcohol    Drug use: Never    Sexual activity: Yes     Partners: Female       Family History   Problem Relation Age of Onset    Cancer Father         Leukemia    Heart disease Maternal Grandfather     Cancer Paternal Grandfather         Lung Cancer    Colon cancer Neg Hx        Review of Systems   Constitutional:  Negative for chills, fever and unexpected weight change.   Respiratory:  Negative for shortness of breath.    Cardiovascular:  Negative for chest pain.   Gastrointestinal:  Negative for abdominal distention, abdominal pain, anal bleeding, blood in stool, constipation, diarrhea, nausea and vomiting.     Objective     Vitals:    06/19/23 1234   BP: 124/84   Pulse: (!) 49   Temp: 96.8 °F (36 °C)   SpO2: 100%         06/19/23  1234   Weight: 94.3 kg (208 lb)     Body mass index is 26 kg/m².    Physical Exam  Vitals reviewed.   Constitutional:       General: He is not in acute distress.  Cardiovascular:      Rate and Rhythm: Normal rate and regular rhythm.      Heart sounds: Normal heart sounds.   Pulmonary:      Effort: Pulmonary effort is normal.      Breath sounds: Normal breath sounds.   Abdominal:      General: Bowel sounds are normal. There is no distension.      Palpations: Abdomen is soft.      Tenderness: There is no abdominal tenderness.   Skin:     General: Skin is warm and dry.   Neurological:      Mental Status: He is alert.       Imaging Results (Most Recent)       None            Assessment & Plan     Diagnoses and all orders for this visit:    1. History of adenomatous polyp of colon (Primary)  -     Case Request; Standing  -     Case Request    Other orders  -     Implement Anesthesia Orders Day of  Procedure; Standing  -     Obtain Informed Consent; Standing      Schedule colonoscopy. Use miralax prep.                COLONOSCOPY WITH ANESTHESIA (N/A)  All risks, benefits, alternatives, and indications of colonoscopy procedure have been discussed with the patient. Risks to include perforation of the colon requiring possible surgery or colostomy, risk of bleeding from biopsies or removal of colon tissue, possibility of missing a colon polyp or cancer, or adverse drug reaction.  Benefits to include the diagnosis and management of disease of the colon and rectum. Alternatives to include barium enema, radiographic evaluation, lab testing or no intervention. Pt verbalizes understanding and agrees.     There are no Patient Instructions on file for this visit.    ANIBAL Rodriguez

## 2023-08-29 ENCOUNTER — ANESTHESIA (OUTPATIENT)
Dept: GASTROENTEROLOGY | Facility: HOSPITAL | Age: 63
End: 2023-08-29
Payer: COMMERCIAL

## 2023-08-29 ENCOUNTER — HOSPITAL ENCOUNTER (OUTPATIENT)
Facility: HOSPITAL | Age: 63
Setting detail: HOSPITAL OUTPATIENT SURGERY
Discharge: HOME | End: 2023-08-29
Attending: INTERNAL MEDICINE | Admitting: INTERNAL MEDICINE
Payer: COMMERCIAL

## 2023-08-29 ENCOUNTER — ANESTHESIA EVENT (OUTPATIENT)
Dept: GASTROENTEROLOGY | Facility: HOSPITAL | Age: 63
End: 2023-08-29
Payer: COMMERCIAL

## 2023-08-29 VITALS
BODY MASS INDEX: 24.87 KG/M2 | HEIGHT: 75 IN | SYSTOLIC BLOOD PRESSURE: 98 MMHG | WEIGHT: 200 LBS | HEART RATE: 53 BPM | OXYGEN SATURATION: 100 % | DIASTOLIC BLOOD PRESSURE: 70 MMHG | RESPIRATION RATE: 17 BRPM | TEMPERATURE: 97.1 F

## 2023-08-29 DIAGNOSIS — Z86.010 HISTORY OF ADENOMATOUS POLYP OF COLON: ICD-10-CM

## 2023-08-29 PROCEDURE — 25010000002 PROPOFOL 10 MG/ML EMULSION: Performed by: NURSE ANESTHETIST, CERTIFIED REGISTERED

## 2023-08-29 PROCEDURE — 45385 COLONOSCOPY W/LESION REMOVAL: CPT | Performed by: INTERNAL MEDICINE

## 2023-08-29 PROCEDURE — 88305 TISSUE EXAM BY PATHOLOGIST: CPT | Performed by: INTERNAL MEDICINE

## 2023-08-29 RX ORDER — ONDANSETRON 2 MG/ML
4 INJECTION INTRAMUSCULAR; INTRAVENOUS ONCE AS NEEDED
Status: DISCONTINUED | OUTPATIENT
Start: 2023-08-29 | End: 2023-08-29 | Stop reason: HOSPADM

## 2023-08-29 RX ORDER — LIDOCAINE HYDROCHLORIDE 20 MG/ML
INJECTION, SOLUTION EPIDURAL; INFILTRATION; INTRACAUDAL; PERINEURAL AS NEEDED
Status: DISCONTINUED | OUTPATIENT
Start: 2023-08-29 | End: 2023-08-29 | Stop reason: SURG

## 2023-08-29 RX ORDER — SODIUM CHLORIDE 0.9 % (FLUSH) 0.9 %
10 SYRINGE (ML) INJECTION AS NEEDED
Status: DISCONTINUED | OUTPATIENT
Start: 2023-08-29 | End: 2023-08-29 | Stop reason: HOSPADM

## 2023-08-29 RX ORDER — LIDOCAINE HYDROCHLORIDE 10 MG/ML
0.5 INJECTION, SOLUTION EPIDURAL; INFILTRATION; INTRACAUDAL; PERINEURAL ONCE AS NEEDED
Status: DISCONTINUED | OUTPATIENT
Start: 2023-08-29 | End: 2023-08-29 | Stop reason: HOSPADM

## 2023-08-29 RX ORDER — SODIUM CHLORIDE 9 MG/ML
500 INJECTION, SOLUTION INTRAVENOUS CONTINUOUS PRN
Status: DISCONTINUED | OUTPATIENT
Start: 2023-08-29 | End: 2023-08-29 | Stop reason: HOSPADM

## 2023-08-29 RX ORDER — PROPOFOL 10 MG/ML
VIAL (ML) INTRAVENOUS AS NEEDED
Status: DISCONTINUED | OUTPATIENT
Start: 2023-08-29 | End: 2023-08-29 | Stop reason: SURG

## 2023-08-29 RX ADMIN — LIDOCAINE HYDROCHLORIDE 100 MG: 20 INJECTION, SOLUTION EPIDURAL; INFILTRATION; INTRACAUDAL; PERINEURAL at 08:37

## 2023-08-29 RX ADMIN — SODIUM CHLORIDE 500 ML: 9 INJECTION, SOLUTION INTRAVENOUS at 08:02

## 2023-08-29 RX ADMIN — PROPOFOL INJECTABLE EMULSION 300 MG: 10 INJECTION, EMULSION INTRAVENOUS at 08:37

## 2023-08-29 NOTE — H&P
[facility] Gastroenterology  Pre Procedure History & Physical    Chief Complaint:   Colon polyps    Subjective     HPI:   The patient has a history of colon polyps who presents for exam.    Past Medical History:   Past Medical History:   Diagnosis Date    Aortic aneurysm     Bicuspid aortic valve     Cancer     skin cancer    Coronary artery disease 2018 / 2022    Aortic Stenosis / aneurysm    Glaucoma     Kidney stone Nov 2020    LVH (left ventricular hypertrophy) 10/29/2020    Mixed hyperlipidemia 10/29/2020    Nonrheumatic aortic valve insufficiency     Nonrheumatic aortic valve stenosis 04/13/2022    Primary hypertension 04/13/2022       Past Surgical History:  Past Surgical History:   Procedure Laterality Date    ANKLE SURGERY      placed joint back in place    COLONOSCOPY  06/17/2011    normal    EXTRACORPOREAL SHOCK WAVE LITHOTRIPSY (ESWL) Right 11/30/2022    Procedure: RIGHT EXTRACORPOREAL SHOCKWAVE LITHOTRIPSY;  Surgeon: Venkat Murphy MD;  Location: [facility];  Service: Urology;  Laterality: Right;    LITHOTRIPSY  November 2022       Family History:  Family History   Problem Relation Age of Onset    Cancer Father         Leukemia    Heart disease Maternal Grandfather     Cancer Paternal Grandfather         Lung Cancer    Colon cancer Neg Hx        Social History:   reports that he has never smoked. He has never used smokeless tobacco. He reports current alcohol use. He reports that he does not use drugs.    Medications:   Prior to Admission medications    Medication Sig Start Date End Date Taking? Authorizing Provider   bimatoprost (LUMIGAN) 0.01 % ophthalmic drops Administer 1 drop to both eyes Every Night. 1/1/21  Yes ProviderWilian MD   ezetimibe (ZETIA) 10 MG tablet Take 1 tablet by mouth Daily. 6/20/23  Yes Teo Pluido APRN   losartan (COZAAR) 25 MG tablet TAKE 1 TABLET BY MOUTH EVERY DAY 3/29/23  Yes Raul Monteior MD   rosuvastatin (CRESTOR) 40 MG tablet Take 1 tablet by mouth  "Daily. 5/2/23  Yes Teo Pulido APRN   timolol (TIMOPTIC) 0.5 % ophthalmic solution Administer 1 drop to both eyes 2 (Two) Times a Day. 11/12/22  Yes Provider, MD Wilian   aspirin 81 MG EC tablet Take 1 tablet by mouth Daily.    Provider, Wilian, MD       Allergies:  Patient has no known allergies.    ROS:    General: Weight stable  Resp: No SOA  Cardiovascular: No CP    Objective     Blood pressure 143/85, pulse 58, temperature 97.1 °F (36.2 °C), temperature source Temporal, resp. rate 18, height 190.5 cm (75\"), weight 90.7 kg (200 lb), SpO2 100 %.    Physical Exam   Constitutional: Pt is oriented to person, place, and in no distress.   Cardiovascular: Normal rate, regular rhythm.    Pulmonary/Chest: Effort normal. No respiratory distress.   Abdominal:  Non-distended.  Psychiatric: Mood, memory, affect and judgment appear normal.     Assessment & Plan     Diagnosis:  Colon polyps    Anticipated Surgical Procedure:  Colonoscopy    The risks, benefits, and alternatives of this procedure have been discussed with the patient or the responsible party- the patient understands and agrees to proceed.      EMR Dragon/transcription disclaimer:  Much of this encounter note is electronic transcription/translation of spoken language to printed text.  The electronic translation of spoken language may be erroneous, or at times, nonsensical words or phrases may be inadvertently transcribed.  Although I have reviewed the note for such errors, some may still exist.  "

## 2023-08-29 NOTE — ANESTHESIA POSTPROCEDURE EVALUATION
"Patient: Melecio Magallon    Procedure Summary       Date: 08/29/23 Room / Location: [facility] ENDOSCOPY 2 / [facility] ENDOSCOPY    Anesthesia Start: 0836 Anesthesia Stop: 0902    Procedure: COLONOSCOPY WITH ANESTHESIA Diagnosis:       History of adenomatous polyp of colon      (History of adenomatous polyp of colon [Z86.010])    Surgeons: Chemo Williamson MD Provider: Marco Garcia CRNA    Anesthesia Type: MAC ASA Status: 4            Anesthesia Type: MAC    Vitals  Vitals Value Taken Time   BP 98/70 08/29/23 0916   Temp     Pulse 53 08/29/23 0918   Resp 17 08/29/23 0915   SpO2 100 % 08/29/23 0918   Vitals shown include unvalidated device data.        Post Anesthesia Care and Evaluation    Patient location during evaluation: PHASE II  Patient participation: complete - patient participated  Level of consciousness: awake and alert  Pain score: 0  Pain management: adequate    Airway patency: patent  Anesthetic complications: No anesthetic complications  PONV Status: none  Cardiovascular status: acceptable  Respiratory status: acceptable  Hydration status: acceptable    Comments: Blood pressure 98/70, pulse 53, temperature 97.1 øF (36.2 øC), temperature source Temporal, resp. rate 17, height 190.5 cm (75\"), weight 90.7 kg (200 lb), SpO2 100 %.    Pt discharged from PACU based on ami score >8    "

## 2023-08-30 LAB
CYTO UR: NORMAL
LAB AP CASE REPORT: NORMAL
Lab: NORMAL
PATH REPORT.FINAL DX SPEC: NORMAL
PATH REPORT.GROSS SPEC: NORMAL

## 2023-09-25 ENCOUNTER — HOSPITAL ENCOUNTER (OUTPATIENT)
Dept: CARDIOLOGY | Facility: HOSPITAL | Age: 63
Discharge: HOME | End: 2023-09-25
Admitting: NURSE PRACTITIONER
Payer: COMMERCIAL

## 2023-09-25 VITALS
WEIGHT: 200 LBS | DIASTOLIC BLOOD PRESSURE: 70 MMHG | BODY MASS INDEX: 24.87 KG/M2 | HEIGHT: 75 IN | SYSTOLIC BLOOD PRESSURE: 98 MMHG

## 2023-09-25 DIAGNOSIS — I35.0 NONRHEUMATIC AORTIC VALVE STENOSIS: ICD-10-CM

## 2023-09-25 DIAGNOSIS — I71.20 THORACIC AORTIC ANEURYSM WITHOUT RUPTURE, UNSPECIFIED PART: ICD-10-CM

## 2023-09-25 PROCEDURE — 93356 MYOCRD STRAIN IMG SPCKL TRCK: CPT

## 2023-09-25 PROCEDURE — 93306 TTE W/DOPPLER COMPLETE: CPT

## 2023-09-30 LAB
ASCENDING AORTA: 5 CM
BH CV ECHO LEFT VENTRICLE GLOBAL LONGITUDINAL STRAIN: -17.4 %
BH CV ECHO MEAS - AO MAX PG: 70.6 MMHG
BH CV ECHO MEAS - AO MEAN PG: 40 MMHG
BH CV ECHO MEAS - AO ROOT DIAM: 4.2 CM
BH CV ECHO MEAS - AO V2 MAX: 420 CM/SEC
BH CV ECHO MEAS - AO V2 VTI: 104 CM
BH CV ECHO MEAS - AVA(I,D): 1 CM2
BH CV ECHO MEAS - EDV(CUBED): 94.2 ML
BH CV ECHO MEAS - EDV(MOD-SP2): 123 ML
BH CV ECHO MEAS - EDV(MOD-SP4): 126 ML
BH CV ECHO MEAS - EF(MOD-BP): 68 %
BH CV ECHO MEAS - EF(MOD-SP2): 68.5 %
BH CV ECHO MEAS - EF(MOD-SP4): 69.4 %
BH CV ECHO MEAS - ESV(CUBED): 22.9 ML
BH CV ECHO MEAS - ESV(MOD-SP2): 38.8 ML
BH CV ECHO MEAS - ESV(MOD-SP4): 38.6 ML
BH CV ECHO MEAS - FS: 37.6 %
BH CV ECHO MEAS - IVS/LVPW: 1.15 CM
BH CV ECHO MEAS - IVSD: 1.46 CM
BH CV ECHO MEAS - LA DIMENSION: 3.9 CM
BH CV ECHO MEAS - LAT PEAK E' VEL: 10.3 CM/SEC
BH CV ECHO MEAS - LV DIASTOLIC VOL/BSA (35-75): 57.4 CM2
BH CV ECHO MEAS - LV MASS(C)D: 243.2 GRAMS
BH CV ECHO MEAS - LV MAX PG: 4 MMHG
BH CV ECHO MEAS - LV MEAN PG: 2 MMHG
BH CV ECHO MEAS - LV SYSTOLIC VOL/BSA (12-30): 17.6 CM2
BH CV ECHO MEAS - LV V1 MAX: 100 CM/SEC
BH CV ECHO MEAS - LV V1 VTI: 27.1 CM
BH CV ECHO MEAS - LVIDD: 4.6 CM
BH CV ECHO MEAS - LVIDS: 2.8 CM
BH CV ECHO MEAS - LVOT AREA: 4.9 CM2
BH CV ECHO MEAS - LVOT DIAM: 2 CM
BH CV ECHO MEAS - LVPWD: 1.27 CM
BH CV ECHO MEAS - MED PEAK E' VEL: 8.1 CM/SEC
BH CV ECHO MEAS - MV A MAX VEL: 66 CM/SEC
BH CV ECHO MEAS - MV DEC SLOPE: 371 CM/SEC2
BH CV ECHO MEAS - MV DEC TIME: 0.25 SEC
BH CV ECHO MEAS - MV E MAX VEL: 92.6 CM/SEC
BH CV ECHO MEAS - MV E/A: 1.4
BH CV ECHO MEAS - MV MAX PG: 3.8 MMHG
BH CV ECHO MEAS - MV MEAN PG: 2 MMHG
BH CV ECHO MEAS - MV V2 VTI: 35.3 CM
BH CV ECHO MEAS - MVA(VTI): 3.8 CM2
BH CV ECHO MEAS - PA V2 MAX: 127 CM/SEC
BH CV ECHO MEAS - RAP SYSTOLE: 5 MMHG
BH CV ECHO MEAS - RV MAX PG: 1.97 MMHG
BH CV ECHO MEAS - RV V1 MAX: 70.2 CM/SEC
BH CV ECHO MEAS - RV V1 VTI: 16 CM
BH CV ECHO MEAS - RVDD: 3.5 CM
BH CV ECHO MEAS - RVSP: 20.2 MMHG
BH CV ECHO MEAS - SI(MOD-SP2): 38.4 ML/M2
BH CV ECHO MEAS - SI(MOD-SP4): 39.8 ML/M2
BH CV ECHO MEAS - SV(LVOT): 133 ML
BH CV ECHO MEAS - SV(MOD-SP2): 84.2 ML
BH CV ECHO MEAS - SV(MOD-SP4): 87.4 ML
BH CV ECHO MEAS - TR MAX PG: 15.2 MMHG
BH CV ECHO MEAS - TR MAX VEL: 195 CM/SEC
BH CV ECHO MEASUREMENTS AVERAGE E/E' RATIO: 10.07
BH CV XLRA - TDI S': 13.1 CM/SEC
LEFT ATRIUM VOLUME INDEX: 37.7 ML/M2
LEFT ATRIUM VOLUME: 82.6 ML

## 2023-10-23 ENCOUNTER — OFFICE VISIT (OUTPATIENT)
Dept: CARDIAC SURGERY | Facility: CLINIC | Age: 63
End: 2023-10-23
Payer: COMMERCIAL

## 2023-10-23 VITALS
HEIGHT: 75 IN | HEART RATE: 69 BPM | SYSTOLIC BLOOD PRESSURE: 112 MMHG | WEIGHT: 201 LBS | OXYGEN SATURATION: 99 % | BODY MASS INDEX: 24.99 KG/M2 | DIASTOLIC BLOOD PRESSURE: 64 MMHG

## 2023-10-23 DIAGNOSIS — Q23.1 BICUSPID AORTIC VALVE: Primary | ICD-10-CM

## 2023-10-23 DIAGNOSIS — I71.20 THORACIC AORTIC ANEURYSM WITHOUT RUPTURE, UNSPECIFIED PART: ICD-10-CM

## 2023-10-23 DIAGNOSIS — Q23.1 BICUSPID AORTIC VALVE: ICD-10-CM

## 2023-10-23 DIAGNOSIS — I35.0 NONRHEUMATIC AORTIC VALVE STENOSIS: Primary | ICD-10-CM

## 2023-10-23 PROCEDURE — 99214 OFFICE O/P EST MOD 30 MIN: CPT | Performed by: SURGERY

## 2023-10-23 NOTE — H&P (VIEW-ONLY)
"    [facility] Cardiothoracic Surgery  PROGRESS NOTE   CC: Aortic stenosis and ascending/aortic root aneurysm with bicuspid aortic valve    Subjective:  An adult female accompanies the patient. The patient states that he has been feeling fine. He denies any shortness of breath or trouble doing the things he wants to do. He reports that he has been doing the things he wants to do. He does agree with having surgery to treat this. He states that his cholesterol is borderline high even with medication so that worries him for surgery.    ROS: No fevers, chills or recent illnesses    Objective:      /64 (BP Location: Right arm, Patient Position: Sitting, Cuff Size: Adult)   Pulse 69   Ht 190.5 cm (75\")   Wt 91.2 kg (201 lb)   SpO2 99%   BMI 25.12 kg/m²         PE:  Vitals:    10/23/23 1419   BP: 112/64   Pulse: 69   SpO2: 99%     GENERAL: NAD, resting comfortably, normal color  CARDIOVASCULAR: regular, regular rate, sinus  PULMONARY: Normal bilateral breath sounds, no labored breathing  ABDOMEN: soft, nontender/nondistended  EXTREMITIES: mild peripheral edema, normal pulses, normal ROM      Lab Results (last 72 hours)       ** No results found for the last 72 hours. **          Echocardiogram:  Interpretation Summary      Left ventricular systolic function is normal. Left ventricular ejection fraction appears to be 61 - 65%.    Left ventricular wall thickness is consistent with mild concentric hypertrophy.    Left ventricular diastolic function was normal.    Severe aortic valve stenosis is present.    Estimated right ventricular systolic pressure from tricuspid regurgitation is normal (<35 mmHg).    Mild dilation of the aortic root is present. Moderate dilation of the ascending aorta is present.    Compared to 9/13/2022 echo aortic stenosis has increased from moderate to severe to now severe    I personally reviewed the echocardiogram; the following is my interpretation:  There is normal LV " function with mild LVH, heavily calcified aortic valve that appears to be bicuspid and is consistent with severe aortic stenosis. The ascending aorta on echo does measure 5 cm in maximum dimension, mean gradient across the aortic valve is 40.    Assessment & Plan     Mr. Magallon is a 63-year-old male. He has a bicuspid aortic valve now with severe stenosis and moderate aortic insufficiency. He has normal LV function. He has an ascending aortic aneurysm that measures 5 cm on the most recent echocardiogram, I last had CTA performed in March of last year and at that time measured it to be 4.9 cm. He is asymptomatic but given size criteria 5 cm he wants to proceed with surgery after discussing the pros, cons, risks, and benefits of proceeding.    We will obtain a repeat CTA of the chest and he will need a coronary angiography by Dr. Monteiro, I will talk to him about this. Today we discussed the risk benefits of proceeding with surgery versus continued surveillance as well as preoperative operative and postoperative expectations. We discussed the risk of surgery including but not limited to bleeding, infection, injury to major organs or vessels, chronic heart failure, arrhythmias, need for pacemaker, prolonged ventilation, renal failure, stroke, risk of anesthesia, risk of sternal wound or bone healing problems, reoperation, and/or death.    I did discuss his patient specific risk for aortic root and hemiarch replacement. He understands and wishes to proceed with surgery.    We will complete his work-up and plan on surgery in mid-November. Thank you for trusting me with the care of Mr. Lora. Please do not hesitate to call with questions or concerns.      Ramon Kumar MD   Cardiothoracic Surgeon    Transcribed from ambient dictation for Ramon Kumar MD by Gabriela Murphy.  10/23/23   16:44 CDT    Patient or patient representative verbalized consent to the visit recording.  I have personally performed the services  described in this document as transcribed by the above individual, and it is both accurate and complete.

## 2023-10-23 NOTE — H&P (VIEW-ONLY)
"    [facility] Cardiothoracic Surgery  PROGRESS NOTE   CC: Aortic stenosis and ascending/aortic root aneurysm with bicuspid aortic valve    Subjective:  An adult female accompanies the patient. The patient states that he has been feeling fine. He denies any shortness of breath or trouble doing the things he wants to do. He reports that he has been doing the things he wants to do. He does agree with having surgery to treat this. He states that his cholesterol is borderline high even with medication so that worries him for surgery.    ROS: No fevers, chills or recent illnesses    Objective:      /64 (BP Location: Right arm, Patient Position: Sitting, Cuff Size: Adult)   Pulse 69   Ht 190.5 cm (75\")   Wt 91.2 kg (201 lb)   SpO2 99%   BMI 25.12 kg/m²         PE:  Vitals:    10/23/23 1419   BP: 112/64   Pulse: 69   SpO2: 99%     GENERAL: NAD, resting comfortably, normal color  CARDIOVASCULAR: regular, regular rate, sinus  PULMONARY: Normal bilateral breath sounds, no labored breathing  ABDOMEN: soft, nontender/nondistended  EXTREMITIES: mild peripheral edema, normal pulses, normal ROM      Lab Results (last 72 hours)       ** No results found for the last 72 hours. **          Echocardiogram:  Interpretation Summary      Left ventricular systolic function is normal. Left ventricular ejection fraction appears to be 61 - 65%.    Left ventricular wall thickness is consistent with mild concentric hypertrophy.    Left ventricular diastolic function was normal.    Severe aortic valve stenosis is present.    Estimated right ventricular systolic pressure from tricuspid regurgitation is normal (<35 mmHg).    Mild dilation of the aortic root is present. Moderate dilation of the ascending aorta is present.    Compared to 9/13/2022 echo aortic stenosis has increased from moderate to severe to now severe    I personally reviewed the echocardiogram; the following is my interpretation:  There is normal LV " function with mild LVH, heavily calcified aortic valve that appears to be bicuspid and is consistent with severe aortic stenosis. The ascending aorta on echo does measure 5 cm in maximum dimension, mean gradient across the aortic valve is 40.    Assessment & Plan     Mr. Magallon is a 63-year-old male. He has a bicuspid aortic valve now with severe stenosis and moderate aortic insufficiency. He has normal LV function. He has an ascending aortic aneurysm that measures 5 cm on the most recent echocardiogram, I last had CTA performed in March of last year and at that time measured it to be 4.9 cm. He is asymptomatic but given size criteria 5 cm he wants to proceed with surgery after discussing the pros, cons, risks, and benefits of proceeding.    We will obtain a repeat CTA of the chest and he will need a coronary angiography by Dr. Monteiro, I will talk to him about this. Today we discussed the risk benefits of proceeding with surgery versus continued surveillance as well as preoperative operative and postoperative expectations. We discussed the risk of surgery including but not limited to bleeding, infection, injury to major organs or vessels, chronic heart failure, arrhythmias, need for pacemaker, prolonged ventilation, renal failure, stroke, risk of anesthesia, risk of sternal wound or bone healing problems, reoperation, and/or death.    I did discuss his patient specific risk for aortic root and hemiarch replacement. He understands and wishes to proceed with surgery.    We will complete his work-up and plan on surgery in mid-November. Thank you for trusting me with the care of Mr. Lora. Please do not hesitate to call with questions or concerns.      Ramon Kumar MD   Cardiothoracic Surgeon    Transcribed from ambient dictation for Ramon Kumar MD by Gabriela Murphy.  10/23/23   16:44 CDT    Patient or patient representative verbalized consent to the visit recording.  I have personally performed the services  described in this document as transcribed by the above individual, and it is both accurate and complete.

## 2023-10-23 NOTE — PROGRESS NOTES
"    [facility] Cardiothoracic Surgery  PROGRESS NOTE   CC: Aortic stenosis and ascending/aortic root aneurysm with bicuspid aortic valve    Subjective:  An adult female accompanies the patient. The patient states that he has been feeling fine. He denies any shortness of breath or trouble doing the things he wants to do. He reports that he has been doing the things he wants to do. He does agree with having surgery to treat this. He states that his cholesterol is borderline high even with medication so that worries him for surgery.    ROS: No fevers, chills or recent illnesses    Objective:      /64 (BP Location: Right arm, Patient Position: Sitting, Cuff Size: Adult)   Pulse 69   Ht 190.5 cm (75\")   Wt 91.2 kg (201 lb)   SpO2 99%   BMI 25.12 kg/m²         PE:  Vitals:    10/23/23 1419   BP: 112/64   Pulse: 69   SpO2: 99%     GENERAL: NAD, resting comfortably, normal color  CARDIOVASCULAR: regular, regular rate, sinus  PULMONARY: Normal bilateral breath sounds, no labored breathing  ABDOMEN: soft, nontender/nondistended  EXTREMITIES: mild peripheral edema, normal pulses, normal ROM      Lab Results (last 72 hours)       ** No results found for the last 72 hours. **          Echocardiogram:  Interpretation Summary      Left ventricular systolic function is normal. Left ventricular ejection fraction appears to be 61 - 65%.    Left ventricular wall thickness is consistent with mild concentric hypertrophy.    Left ventricular diastolic function was normal.    Severe aortic valve stenosis is present.    Estimated right ventricular systolic pressure from tricuspid regurgitation is normal (<35 mmHg).    Mild dilation of the aortic root is present. Moderate dilation of the ascending aorta is present.    Compared to 9/13/2022 echo aortic stenosis has increased from moderate to severe to now severe    I personally reviewed the echocardiogram; the following is my interpretation:  There is normal LV " function with mild LVH, heavily calcified aortic valve that appears to be bicuspid and is consistent with severe aortic stenosis. The ascending aorta on echo does measure 5 cm in maximum dimension, mean gradient across the aortic valve is 40.    Assessment & Plan     Mr. Magallon is a 63-year-old male. He has a bicuspid aortic valve now with severe stenosis and moderate aortic insufficiency. He has normal LV function. He has an ascending aortic aneurysm that measures 5 cm on the most recent echocardiogram, I last had CTA performed in March of last year and at that time measured it to be 4.9 cm. He is asymptomatic but given size criteria 5 cm he wants to proceed with surgery after discussing the pros, cons, risks, and benefits of proceeding.    We will obtain a repeat CTA of the chest and he will need a coronary angiography by Dr. Monteiro, I will talk to him about this. Today we discussed the risk benefits of proceeding with surgery versus continued surveillance as well as preoperative operative and postoperative expectations. We discussed the risk of surgery including but not limited to bleeding, infection, injury to major organs or vessels, chronic heart failure, arrhythmias, need for pacemaker, prolonged ventilation, renal failure, stroke, risk of anesthesia, risk of sternal wound or bone healing problems, reoperation, and/or death.    I did discuss his patient specific risk for aortic root and hemiarch replacement. He understands and wishes to proceed with surgery.    We will complete his work-up and plan on surgery in mid-November. Thank you for trusting me with the care of Mr. Lora. Please do not hesitate to call with questions or concerns.      Ramon Kumar MD   Cardiothoracic Surgeon    Transcribed from ambient dictation for Ramon Kumar MD by Gabriela Murphy.  10/23/23   16:44 CDT    Patient or patient representative verbalized consent to the visit recording.  I have personally performed the services  described in this document as transcribed by the above individual, and it is both accurate and complete.

## 2023-10-23 NOTE — LETTER
October 25, 2023       No Recipients    Patient: Melecio Magallon   YOB: 1960   Date of Visit: 10/23/2023       Dear Johny Luis MD,    Melecio Magallon was in my office today. Below are the relevant portions of my assessment and plan of care.    Mr. Magallon is a 63-year-old male. He has a bicuspid aortic valve now with severe stenosis and moderate aortic insufficiency. He has normal LV function. He has an ascending aortic aneurysm that measures 5 cm on the most recent echocardiogram, I last had CTA performed in March of last year and at that time measured it to be 4.9 cm. He is asymptomatic but given size criteria 5 cm he wants to proceed with surgery after discussing the pros, cons, risks, and benefits of proceeding.    We will obtain a repeat CTA of the chest and he will need a coronary angiography by Dr. Monteiro, I will talk to him about this. Today we discussed the risk benefits of proceeding with surgery versus continued surveillance as well as preoperative operative and postoperative expectations. We discussed the risk of surgery including but not limited to bleeding, infection, injury to major organs or vessels, chronic heart failure, arrhythmias, need for pacemaker, prolonged ventilation, renal failure, stroke, risk of anesthesia, risk of sternal wound or bone healing problems, reoperation, and/or death.    I did discuss his patient specific risk for aortic root and hemiarch replacement. He understands and wishes to proceed with surgery.    We will complete his work-up and plan on surgery in mid-November. Thank you for trusting me with the care of Mr. Lora. Please do not hesitate to call with questions or concerns.         Sincerely,        Ramon Kumar MD        CC:   No Recipients

## 2023-10-24 ENCOUNTER — PREP FOR SURGERY (OUTPATIENT)
Dept: OTHER | Facility: HOSPITAL | Age: 63
End: 2023-10-24
Payer: COMMERCIAL

## 2023-10-24 DIAGNOSIS — I35.0 NONRHEUMATIC AORTIC VALVE STENOSIS: Primary | ICD-10-CM

## 2023-10-24 RX ORDER — ASPIRIN 81 MG/1
81 TABLET ORAL DAILY
OUTPATIENT
Start: 2023-10-25

## 2023-10-24 RX ORDER — ASPIRIN 325 MG
325 TABLET ORAL ONCE
OUTPATIENT
Start: 2023-10-24 | End: 2023-10-24

## 2023-10-24 RX ORDER — SODIUM CHLORIDE 0.9 % (FLUSH) 0.9 %
10 SYRINGE (ML) INJECTION EVERY 12 HOURS SCHEDULED
OUTPATIENT
Start: 2023-10-24

## 2023-10-24 RX ORDER — NITROGLYCERIN 0.4 MG/1
0.4 TABLET SUBLINGUAL
OUTPATIENT
Start: 2023-10-24

## 2023-10-24 RX ORDER — SODIUM CHLORIDE 9 MG/ML
40 INJECTION, SOLUTION INTRAVENOUS AS NEEDED
OUTPATIENT
Start: 2023-10-24

## 2023-10-24 RX ORDER — ONDANSETRON 2 MG/ML
4 INJECTION INTRAMUSCULAR; INTRAVENOUS EVERY 6 HOURS PRN
OUTPATIENT
Start: 2023-10-24

## 2023-10-24 RX ORDER — SODIUM CHLORIDE 0.9 % (FLUSH) 0.9 %
10 SYRINGE (ML) INJECTION AS NEEDED
OUTPATIENT
Start: 2023-10-24

## 2023-10-26 ENCOUNTER — TELEPHONE (OUTPATIENT)
Dept: CARDIAC SURGERY | Facility: CLINIC | Age: 63
End: 2023-10-26
Payer: COMMERCIAL

## 2023-10-26 NOTE — TELEPHONE ENCOUNTER
Pt calling to check status on scheduling his CT.  States Dr Kumar told him at appt that he needed to get this in preparation for surgery and he has seen in MyChart that a message has been sent about it being ordered but he has not heard about an appt yet.  Can reach him at #[phone]/ruiz

## 2023-10-26 NOTE — TELEPHONE ENCOUNTER
Order was placed yesterday, but was deferred to March. Reactivated in WQ and will schedule for next available prior to HC

## 2023-10-30 ENCOUNTER — TELEPHONE (OUTPATIENT)
Dept: CARDIAC SURGERY | Facility: CLINIC | Age: 63
End: 2023-10-30
Payer: COMMERCIAL

## 2023-10-30 ENCOUNTER — PREP FOR SURGERY (OUTPATIENT)
Dept: OTHER | Facility: HOSPITAL | Age: 63
End: 2023-10-30
Payer: COMMERCIAL

## 2023-10-30 DIAGNOSIS — I71.20 THORACIC AORTIC ANEURYSM WITHOUT RUPTURE, UNSPECIFIED PART: Primary | ICD-10-CM

## 2023-10-30 RX ORDER — SODIUM CHLORIDE 0.9 % (FLUSH) 0.9 %
10 SYRINGE (ML) INJECTION AS NEEDED
OUTPATIENT
Start: 2023-10-30

## 2023-10-30 RX ORDER — SODIUM CHLORIDE 0.9 % (FLUSH) 0.9 %
10 SYRINGE (ML) INJECTION EVERY 12 HOURS SCHEDULED
OUTPATIENT
Start: 2023-10-30

## 2023-10-30 RX ORDER — IBUPROFEN 600 MG/1
1 TABLET ORAL
OUTPATIENT
Start: 2023-10-30

## 2023-10-30 RX ORDER — ACETAMINOPHEN 500 MG
1000 TABLET ORAL ONCE
OUTPATIENT
Start: 2023-11-15

## 2023-10-30 RX ORDER — SODIUM CHLORIDE 9 MG/ML
40 INJECTION, SOLUTION INTRAVENOUS AS NEEDED
OUTPATIENT
Start: 2023-10-30

## 2023-10-30 RX ORDER — SODIUM CHLORIDE 0.9 % (FLUSH) 0.9 %
30 SYRINGE (ML) INJECTION ONCE AS NEEDED
OUTPATIENT
Start: 2023-10-30

## 2023-10-30 RX ORDER — SODIUM CHLORIDE 9 MG/ML
30 INJECTION, SOLUTION INTRAVENOUS CONTINUOUS PRN
OUTPATIENT
Start: 2023-10-30

## 2023-10-30 RX ORDER — NICOTINE POLACRILEX 4 MG
15 LOZENGE BUCCAL
OUTPATIENT
Start: 2023-10-30

## 2023-10-30 RX ORDER — DEXTROSE MONOHYDRATE 25 G/50ML
10-50 INJECTION, SOLUTION INTRAVENOUS
OUTPATIENT
Start: 2023-10-30

## 2023-10-30 NOTE — TELEPHONE ENCOUNTER
Surgery orders are in for 11/15/2023.  Please call patient with prework information.  Please confirm he does not take anticoagulation or Plavix.  Dental clearance has been filed in chart

## 2023-10-30 NOTE — TELEPHONE ENCOUNTER
Pt calling.  He was seen last week and Dr Kumar said he would plan to do surgery on 11/14 or 11/15.  Pt has not heard anything further re: this.  Pt calling to check status on scheduling for this.  Can reach him at #[phone]/main

## 2023-10-31 ENCOUNTER — TELEPHONE (OUTPATIENT)
Dept: CARDIAC SURGERY | Facility: CLINIC | Age: 63
End: 2023-10-31
Payer: COMMERCIAL

## 2023-10-31 DIAGNOSIS — I35.0 NONRHEUMATIC AORTIC VALVE STENOSIS: Primary | ICD-10-CM

## 2023-10-31 NOTE — TELEPHONE ENCOUNTER
Patient also needs bilateral carotid ultrasound prior to surgery.  Can we see if they can schedule this the day he comes for CTA of his chest please.

## 2023-11-01 NOTE — TELEPHONE ENCOUNTER
Patient aware of prework date/time and OR date/arrival time 0500/npo/no meds. Aware to  Bactroban for use on 11/14 @ 1700 - instructions given. Confirmed that patient does not take any anticoagulation other than 81 mg aspirin.

## 2023-11-06 ENCOUNTER — HOSPITAL ENCOUNTER (OUTPATIENT)
Dept: ULTRASOUND IMAGING | Facility: HOSPITAL | Age: 63
Discharge: HOME | End: 2023-11-06
Payer: COMMERCIAL

## 2023-11-06 ENCOUNTER — HOSPITAL ENCOUNTER (OUTPATIENT)
Dept: CT IMAGING | Facility: HOSPITAL | Age: 63
Discharge: HOME | End: 2023-11-06
Payer: COMMERCIAL

## 2023-11-06 DIAGNOSIS — Q23.1 BICUSPID AORTIC VALVE: ICD-10-CM

## 2023-11-06 DIAGNOSIS — I35.0 NONRHEUMATIC AORTIC VALVE STENOSIS: ICD-10-CM

## 2023-11-06 DIAGNOSIS — I71.20 THORACIC AORTIC ANEURYSM WITHOUT RUPTURE, UNSPECIFIED PART: ICD-10-CM

## 2023-11-06 LAB — CREAT BLDA-MCNC: 1.3 MG/DL (ref 0.6–1.3)

## 2023-11-06 PROCEDURE — 71275 CT ANGIOGRAPHY CHEST: CPT

## 2023-11-06 PROCEDURE — 93880 EXTRACRANIAL BILAT STUDY: CPT | Performed by: SURGERY

## 2023-11-06 PROCEDURE — 82565 ASSAY OF CREATININE: CPT

## 2023-11-06 PROCEDURE — 25510000001 IOPAMIDOL PER 1 ML: Performed by: NURSE PRACTITIONER

## 2023-11-06 PROCEDURE — 93880 EXTRACRANIAL BILAT STUDY: CPT

## 2023-11-06 RX ADMIN — IOPAMIDOL 100 ML: 755 INJECTION, SOLUTION INTRAVENOUS at 09:47

## 2023-11-06 NOTE — PROGRESS NOTES
. Subjective     Patient ID:  Gisele Kumar is a 69 y.o. ( 1944) female who presents for the following:   Headache; Fever; and Chills      URI    The history is provided by the patient. Episode onset: 5 days ago. There has been a fever of 101 - 101.9 F. Associated symptoms include congestion, headaches (generalized), rhinorrhea (thick clear), sneezing and cough (nonproductive). Pertinent negatives include no chest pain, no abdominal pain, no diarrhea, no nausea, no vomiting, no ear pain, no sinus pain, no sore throat, no neck pain, no rash and no wheezing. Flonase, aleve, inhalers  History of COPD. Also reports that hearing has been muffled for several months. She was told that she has bilateral cerumen impaction by an examiner from her health insurance company. Review of Systems   Constitutional: Positive for appetite change (low), chills and fever. Negative for diaphoresis and fatigue. HENT: Positive for congestion, hearing loss (muffled), rhinorrhea (thick clear), sinus pressure and sneezing. Negative for ear discharge, ear pain, postnasal drip, sinus pain, sore throat and trouble swallowing. Eyes: Negative for discharge, redness and visual disturbance. Respiratory: Positive for cough (nonproductive) and shortness of breath. Negative for chest tightness and wheezing. Cardiovascular: Negative for chest pain. Gastrointestinal: Positive for constipation (laxative was effective). Negative for abdominal pain, diarrhea, nausea and vomiting. Genitourinary: Negative for decreased urine volume. Musculoskeletal: Positive for myalgias. Negative for neck pain and neck stiffness. Skin: Negative for rash. Allergic/Immunologic: Negative for environmental allergies. Neurological: Positive for headaches (generalized). Negative for dizziness and light-headedness. Past Medical History, Past Surgery History, Allergies, Social History, and Family History were reviewed and updated. Past Medical History:   Diagnosis Date    Acute bronchitis 12/28/2015    Advance directive discussed with patient 5/4/2016    Pt would like to appoint her son, Amber Cobb as her medical decision maker in the event that she can no longer do so. Phone number is [phone]. Her secondary person is her next door [employer]. Phone number is [phone]. If her death is imminent and medical treatment will not help her recover, pt does want life prolonging measures.   If her condition makes her unawar    Bilateral leg pain 10/22/2015    Elevated TSH 10/22/2015    Hashimoto's thyroiditis 12/28/2015    Hernia, abdominal     History of benign breast tumor     Menopause     Nicotine dependence in remission 2/4/2016    Obesity, Class I, BMI 30-34.9 2/4/2016    Prediabetes 10/22/2015    Simple chronic bronchitis ([address] 5/4/2016    Skin lesion 2/4/2016    Varicose vein of leg      Past Surgical History:   Procedure Laterality Date    HX BREAST BIOPSY Right     Milk ducts removed    HX CYST INCISION AND DRAINAGE Right     38 y/o    HX HERNIA REPAIR      abdominal x2    HX HERNIA REPAIR  09/15/2016    ROBOTIC REPAIR OF RECURRENT INCARCERATED HERNIA WITH EXTENSIVE LYSIS OF ADHESIONS WITH PLACEMENT OF MESH     Family History   Problem Relation Age of Onset    Breast Cancer Mother 36        43s    Lung Disease Mother     Elevated Lipids Mother     Stroke Father     Diabetes Paternal Grandfather      Social History     Socioeconomic History    Marital status: SINGLE     Spouse name: Not on file    Number of children: Not on file    Years of education: Not on file    Highest education level: Not on file   Occupational History    Not on file   Social Needs    Financial resource strain: Not on file    Food insecurity:     Worry: Not on file     Inability: Not on file    Transportation needs:     Medical: Not on file     Non-medical: Not on file   Tobacco Use    Smoking status: Former Smoker     Packs/day: 1.00     Years: 30.00     Pack years: 30.00     Last attempt to quit: 1992     Years since quittin.4    Smokeless tobacco: Never Used   Substance and Sexual Activity    Alcohol use: Yes     Alcohol/week: 1.2 oz     Types: 2 Glasses of wine per week     Comment: Occasionally     Drug use: No    Sexual activity: Not Currently   Lifestyle    Physical activity:     Days per week: Not on file     Minutes per session: Not on file    Stress: Not on file   Relationships    Social connections:     Talks on phone: Not on file     Gets together: Not on file     Attends [employer] service: Not on file     Active member of club or organization: Not on file     Attends meetings of clubs or organizations: Not on file     Relationship status: Not on file    Intimate partner violence:     Fear of current or ex partner: Not on file     Emotionally abused: Not on file     Physically abused: Not on file     Forced sexual activity: Not on file   Other Topics Concern    Not on file   Social History Narrative    Not on file     No Known Allergies  Current Outpatient Medications on File Prior to Visit   Medication Sig Dispense Refill    levothyroxine (SYNTHROID) 50 mcg tablet Take 1 Tab by mouth Daily (before breakfast). 90 Tab 3    ipratropium (ATROVENT HFA) 17 mcg/actuation inhaler 2 puffs every 8 hours. 3 Inhaler 4    budesonide-formoterol (SYMBICORT) 80-4.5 mcg/actuation HFAA Take 2 Puffs by inhalation two (2) times a day. 3 Inhaler 4    omega-3 fatty acids-vitamin e (FISH OIL) 1,000 mg cap Take 1 capsule by mouth.  calcium-cholecalciferol, D3, (CALTRATE 600+D) tablet Take 1 tablet by mouth daily.  aspirin delayed-release 81 mg tablet Take  by mouth daily. No current facility-administered medications on file prior to visit.         Objective     Visit Vitals  BP 99/65   Pulse 86   Temp 98.4 °F (36.9 °C) (Oral)   Resp 20   Ht 5' 8\" (1.727 m)   Wt 200 lb 3.2 oz (90.8 kg) SpO2 93%   BMI 30.44 kg/m²     No LMP recorded. Patient is postmenopausal.    Physical Exam   Constitutional: She is oriented to person, place, and time. She appears well-developed and well-nourished. Non-toxic appearance. No distress. HENT:   Right Ear: Hearing and ear canal normal. Tympanic membrane is not erythematous, not retracted and not bulging. Left Ear: Hearing, tympanic membrane and ear canal normal. Tympanic membrane is not erythematous, not retracted and not bulging. Nose: No mucosal edema, rhinorrhea or sinus tenderness. Mouth/Throat: Oropharynx is clear and moist and mucous membranes are normal. No uvula swelling. No oropharyngeal exudate, posterior oropharyngeal edema, posterior oropharyngeal erythema or tonsillar abscesses. Bilateral cerumen impaction. After ear lavage TMs were visualized and right TM was dull, but no erythema or bulging. She reported improved hearing immediately after procedure. Eyes: Conjunctivae are normal. Right eye exhibits no discharge. Left eye exhibits no discharge. Neck: Neck supple. Cardiovascular: Normal rate, regular rhythm and normal heart sounds. No murmur heard. Pulmonary/Chest: Effort normal and breath sounds normal. No respiratory distress. She has no decreased breath sounds. She has no wheezes. She has no rhonchi. She has no rales. Lymphadenopathy:     She has no cervical adenopathy. Neurological: She is alert and oriented to person, place, and time. Skin: Skin is warm and dry. No rash noted. She is not diaphoretic. Psychiatric: She has a normal mood and affect. Assessment and Plan     1. Upper respiratory tract infection, unspecified type   May use over-the-counter decongestants, nasal sprays, analgesics, and cough suppressants as needed. Follow up if symptoms worsen or fail to resolve over the next 3-5 days. 2. Bilateral hearing loss due to cerumen impaction  - REMOVE IMPACTED EAR WAX  Ear lavage done by nursing. Patient tolerated well. Reported improved hearing immediately after procedure. Follow-up and Dispositions    · Return if symptoms worsen or fail to improve. Risks, benefits, and alternatives of the medications and treatment plan prescribed today were discussed, and patient expressed understanding. Printed after visit summary was given to patient and reviewed. All patient questions and concerns were addressed. Plan follow-up as discussed or as needed if any worsening symptoms or change in condition.            Signed electronically by Tessa Blevins DNP, HEATH-BC

## 2023-11-07 ENCOUNTER — HOSPITAL ENCOUNTER (OUTPATIENT)
Facility: HOSPITAL | Age: 63
Setting detail: HOSPITAL OUTPATIENT SURGERY
Discharge: HOME | End: 2023-11-07
Attending: INTERNAL MEDICINE | Admitting: INTERNAL MEDICINE
Payer: COMMERCIAL

## 2023-11-07 VITALS
OXYGEN SATURATION: 100 % | HEART RATE: 48 BPM | DIASTOLIC BLOOD PRESSURE: 73 MMHG | RESPIRATION RATE: 19 BRPM | TEMPERATURE: 98.1 F | SYSTOLIC BLOOD PRESSURE: 106 MMHG

## 2023-11-07 DIAGNOSIS — I35.0 NONRHEUMATIC AORTIC VALVE STENOSIS: ICD-10-CM

## 2023-11-07 LAB
ALBUMIN SERPL-MCNC: 4.3 G/DL (ref 3.5–5.2)
ALBUMIN/GLOB SERPL: 2 G/DL
ALP SERPL-CCNC: 58 U/L (ref 39–117)
ALT SERPL W P-5'-P-CCNC: 31 U/L (ref 1–41)
ANION GAP SERPL CALCULATED.3IONS-SCNC: 5 MMOL/L (ref 5–15)
AST SERPL-CCNC: 30 U/L (ref 1–40)
BILIRUB SERPL-MCNC: 0.4 MG/DL (ref 0–1.2)
BUN SERPL-MCNC: 15 MG/DL (ref 8–23)
BUN/CREAT SERPL: 12.1 (ref 7–25)
CALCIUM SPEC-SCNC: 8.7 MG/DL (ref 8.6–10.5)
CHLORIDE SERPL-SCNC: 107 MMOL/L (ref 98–107)
CO2 SERPL-SCNC: 30 MMOL/L (ref 22–29)
CREAT SERPL-MCNC: 1.24 MG/DL (ref 0.76–1.27)
DEPRECATED RDW RBC AUTO: 43 FL (ref 37–54)
EGFRCR SERPLBLD CKD-EPI 2021: 65.7 ML/MIN/1.73
ERYTHROCYTE [DISTWIDTH] IN BLOOD BY AUTOMATED COUNT: 13.1 % (ref 12.3–15.4)
GLOBULIN UR ELPH-MCNC: 2.1 GM/DL
GLUCOSE SERPL-MCNC: 118 MG/DL (ref 65–99)
HCT VFR BLD AUTO: 36.4 % (ref 37.5–51)
HGB BLD-MCNC: 11.5 G/DL (ref 13–17.7)
MCH RBC QN AUTO: 28 PG (ref 26.6–33)
MCHC RBC AUTO-ENTMCNC: 31.6 G/DL (ref 31.5–35.7)
MCV RBC AUTO: 88.8 FL (ref 79–97)
PLATELET # BLD AUTO: 148 10*3/MM3 (ref 140–450)
PMV BLD AUTO: 11.8 FL (ref 6–12)
POTASSIUM SERPL-SCNC: 4.8 MMOL/L (ref 3.5–5.2)
PROT SERPL-MCNC: 6.4 G/DL (ref 6–8.5)
RBC # BLD AUTO: 4.1 10*6/MM3 (ref 4.14–5.8)
SODIUM SERPL-SCNC: 142 MMOL/L (ref 136–145)
WBC NRBC COR # BLD: 5.5 10*3/MM3 (ref 3.4–10.8)

## 2023-11-07 PROCEDURE — 80053 COMPREHEN METABOLIC PANEL: CPT | Performed by: NURSE PRACTITIONER

## 2023-11-07 PROCEDURE — 25010000002 MIDAZOLAM PER 1 MG: Performed by: INTERNAL MEDICINE

## 2023-11-07 PROCEDURE — 85027 COMPLETE CBC AUTOMATED: CPT | Performed by: NURSE PRACTITIONER

## 2023-11-07 PROCEDURE — 25010000002 DIPHENHYDRAMINE PER 50 MG: Performed by: INTERNAL MEDICINE

## 2023-11-07 PROCEDURE — 25010000002 HEPARIN (PORCINE) 2000-0.9 UNIT/L-% SOLUTION: Performed by: INTERNAL MEDICINE

## 2023-11-07 PROCEDURE — 25010000002 FENTANYL CITRATE (PF) 50 MCG/ML SOLUTION: Performed by: INTERNAL MEDICINE

## 2023-11-07 PROCEDURE — C1894 INTRO/SHEATH, NON-LASER: HCPCS | Performed by: INTERNAL MEDICINE

## 2023-11-07 PROCEDURE — 25510000001 IOPAMIDOL PER 1 ML: Performed by: INTERNAL MEDICINE

## 2023-11-07 PROCEDURE — 93454 CORONARY ARTERY ANGIO S&I: CPT | Performed by: INTERNAL MEDICINE

## 2023-11-07 PROCEDURE — 99152 MOD SED SAME PHYS/QHP 5/>YRS: CPT | Performed by: INTERNAL MEDICINE

## 2023-11-07 PROCEDURE — 25010000002 HEPARIN (PORCINE) 1000-0.9 UT/500ML-% SOLUTION: Performed by: INTERNAL MEDICINE

## 2023-11-07 RX ORDER — ASPIRIN 325 MG
325 TABLET ORAL ONCE
Status: DISCONTINUED | OUTPATIENT
Start: 2023-11-07 | End: 2023-11-07 | Stop reason: HOSPADM

## 2023-11-07 RX ORDER — SODIUM CHLORIDE 9 MG/ML
100 INJECTION, SOLUTION INTRAVENOUS CONTINUOUS
Status: CANCELLED | OUTPATIENT
Start: 2023-11-07

## 2023-11-07 RX ORDER — ACETAMINOPHEN 325 MG/1
650 TABLET ORAL EVERY 4 HOURS PRN
Status: CANCELLED | OUTPATIENT
Start: 2023-11-07

## 2023-11-07 RX ORDER — NITROGLYCERIN 0.4 MG/1
0.4 TABLET SUBLINGUAL
Status: DISCONTINUED | OUTPATIENT
Start: 2023-11-07 | End: 2023-11-07 | Stop reason: HOSPADM

## 2023-11-07 RX ORDER — SODIUM CHLORIDE 0.9 % (FLUSH) 0.9 %
10 SYRINGE (ML) INJECTION AS NEEDED
Status: CANCELLED | OUTPATIENT
Start: 2023-11-07

## 2023-11-07 RX ORDER — LIDOCAINE HYDROCHLORIDE 20 MG/ML
INJECTION, SOLUTION INFILTRATION; PERINEURAL
Status: DISCONTINUED | OUTPATIENT
Start: 2023-11-07 | End: 2023-11-07 | Stop reason: HOSPADM

## 2023-11-07 RX ORDER — VERAPAMIL HYDROCHLORIDE 2.5 MG/ML
INJECTION, SOLUTION INTRAVENOUS
Status: DISCONTINUED | OUTPATIENT
Start: 2023-11-07 | End: 2023-11-07 | Stop reason: HOSPADM

## 2023-11-07 RX ORDER — SODIUM CHLORIDE 0.9 % (FLUSH) 0.9 %
10 SYRINGE (ML) INJECTION EVERY 12 HOURS SCHEDULED
Status: DISCONTINUED | OUTPATIENT
Start: 2023-11-07 | End: 2023-11-07 | Stop reason: HOSPADM

## 2023-11-07 RX ORDER — FENTANYL CITRATE 50 UG/ML
INJECTION, SOLUTION INTRAMUSCULAR; INTRAVENOUS
Status: DISCONTINUED | OUTPATIENT
Start: 2023-11-07 | End: 2023-11-07 | Stop reason: HOSPADM

## 2023-11-07 RX ORDER — SODIUM CHLORIDE 0.9 % (FLUSH) 0.9 %
10 SYRINGE (ML) INJECTION EVERY 12 HOURS SCHEDULED
Status: CANCELLED | OUTPATIENT
Start: 2023-11-07

## 2023-11-07 RX ORDER — ONDANSETRON 2 MG/ML
4 INJECTION INTRAMUSCULAR; INTRAVENOUS EVERY 6 HOURS PRN
Status: DISCONTINUED | OUTPATIENT
Start: 2023-11-07 | End: 2023-11-07 | Stop reason: HOSPADM

## 2023-11-07 RX ORDER — ASPIRIN 81 MG/1
81 TABLET ORAL DAILY
Status: DISCONTINUED | OUTPATIENT
Start: 2023-11-08 | End: 2023-11-07 | Stop reason: HOSPADM

## 2023-11-07 RX ORDER — HEPARIN SODIUM 200 [USP'U]/100ML
INJECTION, SOLUTION INTRAVENOUS
Status: DISCONTINUED | OUTPATIENT
Start: 2023-11-07 | End: 2023-11-07 | Stop reason: HOSPADM

## 2023-11-07 RX ORDER — SODIUM CHLORIDE 9 MG/ML
40 INJECTION, SOLUTION INTRAVENOUS AS NEEDED
Status: CANCELLED | OUTPATIENT
Start: 2023-11-07

## 2023-11-07 RX ORDER — MIDAZOLAM HYDROCHLORIDE 1 MG/ML
INJECTION INTRAMUSCULAR; INTRAVENOUS
Status: DISCONTINUED | OUTPATIENT
Start: 2023-11-07 | End: 2023-11-07 | Stop reason: HOSPADM

## 2023-11-07 RX ORDER — SODIUM CHLORIDE 0.9 % (FLUSH) 0.9 %
10 SYRINGE (ML) INJECTION AS NEEDED
Status: DISCONTINUED | OUTPATIENT
Start: 2023-11-07 | End: 2023-11-07 | Stop reason: HOSPADM

## 2023-11-07 RX ORDER — SODIUM CHLORIDE 9 MG/ML
40 INJECTION, SOLUTION INTRAVENOUS AS NEEDED
Status: DISCONTINUED | OUTPATIENT
Start: 2023-11-07 | End: 2023-11-07 | Stop reason: HOSPADM

## 2023-11-07 RX ORDER — DIPHENHYDRAMINE HYDROCHLORIDE 50 MG/ML
INJECTION INTRAMUSCULAR; INTRAVENOUS
Status: DISCONTINUED | OUTPATIENT
Start: 2023-11-07 | End: 2023-11-07 | Stop reason: HOSPADM

## 2023-11-07 NOTE — INTERVAL H&P NOTE
H&P reviewed. The patient was examined and there are no changes to the H&P.       LOS: 0 days   Patient Care Team:  Johny Luis MD as PCP - General (Family Medicine)  Raul Monteiro MD as Cardiologist (Cardiology)  Roberto Doss MD as Referring Physician (Family Medicine)  José, Ramon TAVAREZ MD as Consulting Physician (Cardiothoracic Surgery)  Alysia Fitzpatrick APRN as Nurse Practitioner (Family Medicine)  Chemo Williamson MD as Consulting Physician (Gastroenterology)    Chief Complaint: Shortness of breath     Subjective    Melecio Magallon is a 63 y.o. male who is being seen evaluation.  Essentially has severe aortic stenosis  Mild aortic root enlargement  Has been referred by CT surgery for coronary angiography prior to surgical repair of the aortic valve  He has presented for this  Accompanied by female family member    Review of Systems   Constitutional: No chills   Has fatigue   No fever.   HENT: Negative.    Eyes: Negative.    Respiratory: Negative for cough,   No chest wall soreness,   Shortness of breath,   no wheezing, no stridor.    Cardiovascular: As above  Gastrointestinal: Negative for abdominal distention,  No abdominal pain,   No blood in stool,   No constipation,   No diarrhea,   No nausea   No vomiting.   Endocrine: Negative.    Genitourinary: Negative for difficulty urinating, dysuria, flank pain and hematuria.   Musculoskeletal: Negative.    Skin: Negative for rash and wound.   Allergic/Immunologic: Negative.    Neurological: Negative for dizziness, syncope, weakness,   No light-headedness  No  headaches.   Hematological: Does not bruise/bleed easily.   Psychiatric/Behavioral: Negative for agitation or behavioral problems,   No confusion,   the patient is  nervous/anxious.       History:   Past Medical History:   Diagnosis Date    Aortic aneurysm     Bicuspid aortic valve     Cancer     skin cancer    Coronary artery disease 2018 / 2022    Aortic Stenosis / aneurysm    Glaucoma   "   Kidney stone Nov 2020    LVH (left ventricular hypertrophy) 10/29/2020    Mixed hyperlipidemia 10/29/2020    Nonrheumatic aortic valve insufficiency     Nonrheumatic aortic valve stenosis 04/13/2022    Primary hypertension 04/13/2022     Past Surgical History:   Procedure Laterality Date    ANKLE SURGERY      placed joint back in place    COLONOSCOPY  06/17/2011    normal    COLONOSCOPY N/A 8/29/2023    Procedure: COLONOSCOPY WITH ANESTHESIA;  Surgeon: Chemo Williamson MD;  Location: [facility] ENDOSCOPY;  Service: Gastroenterology;  Laterality: N/A;  Pre: History of adenomatous polyp of colon;  Post: Polyp;  Johny Luis MD    EXTRACORPOREAL SHOCK WAVE LITHOTRIPSY (ESWL) Right 11/30/2022    Procedure: RIGHT EXTRACORPOREAL SHOCKWAVE LITHOTRIPSY;  Surgeon: Venkat Murphy MD;  Location: [facility] OR;  Service: Urology;  Laterality: Right;    LITHOTRIPSY  November 2022     Social History     Socioeconomic History    Marital status:    Tobacco Use    Smoking status: Never     Passive exposure: Never    Smokeless tobacco: Never   Vaping Use    Vaping Use: Never used   Substance and Sexual Activity    Alcohol use: Yes     Comment: Occasional use of alcohol \"Rarely\"    Drug use: Never    Sexual activity: Yes     Partners: Female     Family History   Problem Relation Age of Onset    Cancer Father         Leukemia    Heart disease Maternal Grandfather     Cancer Paternal Grandfather         Lung Cancer    Colon cancer Neg Hx        Labs:  WBC WBC   Date Value Ref Range Status   11/07/2023 5.50 3.40 - 10.80 10*3/mm3 Final      HGB Hemoglobin   Date Value Ref Range Status   11/07/2023 11.5 (L) 13.0 - 17.7 g/dL Final      HCT Hematocrit   Date Value Ref Range Status   11/07/2023 36.4 (L) 37.5 - 51.0 % Final      Platelets Platelets   Date Value Ref Range Status   11/07/2023 148 140 - 450 10*3/mm3 Final      MCV MCV   Date Value Ref Range Status   11/07/2023 88.8 79.0 - 97.0 fL Final        Results from last 7 " "days   Lab Units 11/07/23  0749 11/06/23  0255   SODIUM mmol/L 142  --    POTASSIUM mmol/L 4.8  --    CHLORIDE mmol/L 107  --    CO2 mmol/L 30.0*  --    BUN mg/dL 15  --    CREATININE mg/dL 1.24 1.30   CALCIUM mg/dL 8.7  --    BILIRUBIN mg/dL 0.4  --    ALK PHOS U/L 58  --    ALT (SGPT) U/L 31  --    AST (SGOT) U/L 30  --    GLUCOSE mg/dL 118*  --    No results found for: \"CKTOTAL\", \"CKMB\", \"CKMBINDEX\", \"TROPONINI\", \"TROPONINT\"  PT/INR:  No results found for: \"PROTIME\"/No results found for: \"INR\"    Imaging Results (Last 72 Hours)       ** No results found for the last 72 hours. **            Objective     No Known Allergies    Medication Review: Performed  Current Facility-Administered Medications   Medication Dose Route Frequency Provider Last Rate Last Admin    aspirin tablet 325 mg  325 mg Oral Once Alysia Fitzpatrick APRN        And    [START ON 11/8/2023] aspirin EC tablet 81 mg  81 mg Oral Daily Alysia Fitzpatrick APRN        nitroglycerin (NITROSTAT) SL tablet 0.4 mg  0.4 mg Sublingual Q5 Min PRN Alysia Fitzpatrick N, APRN        ondansetron (ZOFRAN) injection 4 mg  4 mg Intravenous Q6H PRN Alysia Fitzpatrick, APRN        sodium chloride 0.9 % flush 10 mL  10 mL Intravenous Q12H Alysia Fitzpatrick, APRN        sodium chloride 0.9 % flush 10 mL  10 mL Intravenous PRN Alysia Fitzpatrick, APRN        sodium chloride 0.9 % infusion 40 mL  40 mL Intravenous PRN Alysia Fitzpatrick, APRN           Vital Sign Min/Max for last 24 hours  Temp  Min: 98.1 °F (36.7 °C)  Max: 98.1 °F (36.7 °C)   BP  Min: 132/92  Max: 132/92   Pulse  Min: 57  Max: 57   Resp  Min: 10  Max: 10   SpO2  Min: 99 %  Max: 99 %   No data recorded   No data recorded         Results for orders placed during the hospital encounter of 09/25/23    Adult Transthoracic Echo Complete w/ Color, Spectral and Contrast if necessary per protocol    Interpretation Summary    Left ventricular systolic function is normal. Left ventricular ejection fraction " appears to be 61 - 65%.    Left ventricular wall thickness is consistent with mild concentric hypertrophy.    Left ventricular diastolic function was normal.    Severe aortic valve stenosis is present.    Estimated right ventricular systolic pressure from tricuspid regurgitation is normal (<35 mmHg).    Mild dilation of the aortic root is present. Moderate dilation of the ascending aorta is present.    Compared to 9/13/2022 echo aortic stenosis has increased from moderate to severe to now severe      Physical Exam:    General Appearance: Awake, alert, in no acute distress  Eyes: Pupils equal and reactive    Ears: Appear intact with no abnormalities noted  Nose: Nares normal, no drainage  Neck: supple, trachea midline, no carotid bruit and no JVD  Back: no kyphosis present,    Lungs: respirations regular, respirations even and respirations unlabored  Heart: normal S1, S2, harsh 3/6 to 4/6 systolic murmur left sternal border  No gallops or rubs  no rub and no click  Abdomen: normal bowel sounds, no tenderness   Skin: no bleeding, bruising or rash  Extremities: no cyanosis  Psychiatric/Behavioral: Negative for agitation, behavioral problems, confusion, the patient does  appear to be nervous/anxious.       Results Review:   I reviewed the patient's new clinical results.  I reviewed the patient's new imaging results and agree with the interpretation.  I reviewed the patient's other test results and agree with the interpretation  I personally viewed and interpreted the patient's EKG/Telemetry data    Discussed with patient  Updated patient regarding any new or relevant abnormalities on review of records or any new findings on physical exam.   Mentioned to patient about purpose of visit and desirable health short and long term goals and objectives.     Reviewed available prior notes, consults, prior visits, laboratory findings, radiology and cardiology relevant reports.   Updated chart as applicable.   I have reviewed the  patient's medical history in detail and updated the computerized patient record as relevant.          Assessment & Plan       Nonrheumatic aortic valve stenosis      Plan    Recommend cardiac catheterization, selective coronary angiography, left ventriculography and percutaneous coronary intervention with application of arteriotomy hemostatic closure device.    I discussed cardiac catheterization, the procedure, risks (including bleeding, infection, vascular damage [including minor oozing, bruising, bleeding, and up to and including but not limited to the need for vascular surgery, emergency cardiothoracic surgery, contrast reaction, renal failure, respiratory failure, heart attack, stroke, arrhythmia and even death), benefits, and alternatives and the patient has voiced understanding and is willing to proceed.    Adequate pre-hydration and post cardiac catheterization hydration.  Premedications as required and indicated for cardiac catheterization.    No contraindication to drug eluting stent placement if required if required however likely this will not be the case as anticipated surgical repair of the aortic valve  Further recommendations pending results of cardiac catheterization   Right radial arterial approach for cardiac cath.         Raul Monteiro MD  11/07/23  09:18 CST    EMR Dragon/Transcription was used to dictate part of this note

## 2023-11-09 ENCOUNTER — TELEPHONE (OUTPATIENT)
Dept: CARDIAC SURGERY | Facility: CLINIC | Age: 63
End: 2023-11-09

## 2023-11-09 NOTE — TELEPHONE ENCOUNTER
OR date changed to 11/21. Called patient and advised of new OR date 11/21 arrival time 0900 and prework 11/17. He voiced understanding to all.

## 2023-11-17 ENCOUNTER — PRE-ADMISSION TESTING (OUTPATIENT)
Dept: PREADMISSION TESTING | Facility: HOSPITAL | Age: 63
End: 2023-11-17
Payer: COMMERCIAL

## 2023-11-17 ENCOUNTER — HOSPITAL ENCOUNTER (OUTPATIENT)
Dept: PULMONOLOGY | Facility: HOSPITAL | Age: 63
Discharge: HOME | End: 2023-11-17
Payer: COMMERCIAL

## 2023-11-17 ENCOUNTER — HOSPITAL ENCOUNTER (OUTPATIENT)
Dept: GENERAL RADIOLOGY | Facility: HOSPITAL | Age: 63
Discharge: HOME | End: 2023-11-17
Payer: COMMERCIAL

## 2023-11-17 ENCOUNTER — ANESTHESIA EVENT (OUTPATIENT)
Dept: PERIOP | Facility: HOSPITAL | Age: 63
End: 2023-11-17
Payer: COMMERCIAL

## 2023-11-17 VITALS
BODY MASS INDEX: 24.97 KG/M2 | HEART RATE: 59 BPM | RESPIRATION RATE: 16 BRPM | OXYGEN SATURATION: 99 % | DIASTOLIC BLOOD PRESSURE: 71 MMHG | HEIGHT: 76 IN | WEIGHT: 205.03 LBS | SYSTOLIC BLOOD PRESSURE: 131 MMHG

## 2023-11-17 DIAGNOSIS — I71.20 THORACIC AORTIC ANEURYSM WITHOUT RUPTURE, UNSPECIFIED PART: ICD-10-CM

## 2023-11-17 DIAGNOSIS — I35.0 NONRHEUMATIC AORTIC VALVE STENOSIS: ICD-10-CM

## 2023-11-17 LAB
ABO GROUP BLD: NORMAL
ALBUMIN SERPL-MCNC: 4.5 G/DL (ref 3.5–5.2)
ALBUMIN/GLOB SERPL: 2 G/DL
ALP SERPL-CCNC: 66 U/L (ref 39–117)
ALT SERPL W P-5'-P-CCNC: 64 U/L (ref 1–41)
ANION GAP SERPL CALCULATED.3IONS-SCNC: 7 MMOL/L (ref 5–15)
APTT PPP: 27.4 SECONDS (ref 24.5–36)
ARTERIAL PATENCY WRIST A: NORMAL
AST SERPL-CCNC: 45 U/L (ref 1–40)
ATMOSPHERIC PRESS: 748 MMHG
BASE EXCESS BLDA CALC-SCNC: 1.1 MMOL/L (ref 0–2)
BASOPHILS # BLD AUTO: 0.03 10*3/MM3 (ref 0–0.2)
BASOPHILS NFR BLD AUTO: 0.5 % (ref 0–1.5)
BDY SITE: NORMAL
BILIRUB SERPL-MCNC: 0.4 MG/DL (ref 0–1.2)
BILIRUB UR QL STRIP: NEGATIVE
BLD GP AB SCN SERPL QL: NEGATIVE
BODY TEMPERATURE: 37
BUN SERPL-MCNC: 17 MG/DL (ref 8–23)
BUN/CREAT SERPL: 16.3 (ref 7–25)
CALCIUM SPEC-SCNC: 9.1 MG/DL (ref 8.6–10.5)
CHLORIDE SERPL-SCNC: 104 MMOL/L (ref 98–107)
CLARITY UR: CLEAR
CO2 SERPL-SCNC: 29 MMOL/L (ref 22–29)
COLOR UR: YELLOW
CREAT SERPL-MCNC: 1.04 MG/DL (ref 0.76–1.27)
DEPRECATED RDW RBC AUTO: 42.8 FL (ref 37–54)
EGFRCR SERPLBLD CKD-EPI 2021: 81.2 ML/MIN/1.73
EOSINOPHIL # BLD AUTO: 0.16 10*3/MM3 (ref 0–0.4)
EOSINOPHIL NFR BLD AUTO: 2.6 % (ref 0.3–6.2)
ERYTHROCYTE [DISTWIDTH] IN BLOOD BY AUTOMATED COUNT: 13.1 % (ref 12.3–15.4)
GLOBULIN UR ELPH-MCNC: 2.2 GM/DL
GLUCOSE SERPL-MCNC: 92 MG/DL (ref 65–99)
GLUCOSE UR STRIP-MCNC: NEGATIVE MG/DL
HBA1C MFR BLD: 5.5 % (ref 4.8–5.6)
HCO3 BLDA-SCNC: 25.9 MMOL/L (ref 20–26)
HCT VFR BLD AUTO: 38.7 % (ref 37.5–51)
HGB BLD-MCNC: 12 G/DL (ref 13–17.7)
HGB UR QL STRIP.AUTO: NEGATIVE
IMM GRANULOCYTES # BLD AUTO: 0.01 10*3/MM3 (ref 0–0.05)
IMM GRANULOCYTES NFR BLD AUTO: 0.2 % (ref 0–0.5)
INR PPP: 1 (ref 0.91–1.09)
KETONES UR QL STRIP: NEGATIVE
LEUKOCYTE ESTERASE UR QL STRIP.AUTO: NEGATIVE
LYMPHOCYTES # BLD AUTO: 1.63 10*3/MM3 (ref 0.7–3.1)
LYMPHOCYTES NFR BLD AUTO: 26 % (ref 19.6–45.3)
Lab: NORMAL
MCH RBC QN AUTO: 27.6 PG (ref 26.6–33)
MCHC RBC AUTO-ENTMCNC: 31 G/DL (ref 31.5–35.7)
MCV RBC AUTO: 89.2 FL (ref 79–97)
MODALITY: NORMAL
MONOCYTES # BLD AUTO: 0.5 10*3/MM3 (ref 0.1–0.9)
MONOCYTES NFR BLD AUTO: 8 % (ref 5–12)
NEUTROPHILS NFR BLD AUTO: 3.94 10*3/MM3 (ref 1.7–7)
NEUTROPHILS NFR BLD AUTO: 62.7 % (ref 42.7–76)
NITRITE UR QL STRIP: NEGATIVE
NRBC BLD AUTO-RTO: 0 /100 WBC (ref 0–0.2)
PCO2 BLDA: 41.1 MM HG (ref 35–45)
PCO2 TEMP ADJ BLD: 41.1 MM HG (ref 35–45)
PH BLDA: 7.41 PH UNITS (ref 7.35–7.45)
PH UR STRIP.AUTO: 6.5 [PH] (ref 5–8)
PH, TEMP CORRECTED: 7.41 PH UNITS (ref 7.35–7.45)
PLATELET # BLD AUTO: 161 10*3/MM3 (ref 140–450)
PMV BLD AUTO: 12 FL (ref 6–12)
PO2 BLDA: 93.9 MM HG (ref 83–108)
PO2 TEMP ADJ BLD: 93.9 MM HG (ref 83–108)
POTASSIUM SERPL-SCNC: 5 MMOL/L (ref 3.5–5.2)
PROT SERPL-MCNC: 6.7 G/DL (ref 6–8.5)
PROT UR QL STRIP: NEGATIVE
PROTHROMBIN TIME: 13.3 SECONDS (ref 11.8–14.8)
RBC # BLD AUTO: 4.34 10*6/MM3 (ref 4.14–5.8)
RH BLD: NEGATIVE
SAO2 % BLDCOA: 98.5 % (ref 94–99)
SODIUM SERPL-SCNC: 140 MMOL/L (ref 136–145)
SP GR UR STRIP: 1.02 (ref 1–1.03)
T&S EXPIRATION DATE: NORMAL
UROBILINOGEN UR QL STRIP: NORMAL
VENTILATOR MODE: NORMAL
WBC NRBC COR # BLD AUTO: 6.27 10*3/MM3 (ref 3.4–10.8)

## 2023-11-17 PROCEDURE — 80053 COMPREHEN METABOLIC PANEL: CPT

## 2023-11-17 PROCEDURE — 86850 RBC ANTIBODY SCREEN: CPT

## 2023-11-17 PROCEDURE — 82803 BLOOD GASES ANY COMBINATION: CPT

## 2023-11-17 PROCEDURE — 81003 URINALYSIS AUTO W/O SCOPE: CPT

## 2023-11-17 PROCEDURE — 86901 BLOOD TYPING SEROLOGIC RH(D): CPT

## 2023-11-17 PROCEDURE — 85025 COMPLETE CBC W/AUTO DIFF WBC: CPT

## 2023-11-17 PROCEDURE — 83036 HEMOGLOBIN GLYCOSYLATED A1C: CPT

## 2023-11-17 PROCEDURE — 36415 COLL VENOUS BLD VENIPUNCTURE: CPT

## 2023-11-17 PROCEDURE — 36600 WITHDRAWAL OF ARTERIAL BLOOD: CPT

## 2023-11-17 PROCEDURE — 86900 BLOOD TYPING SEROLOGIC ABO: CPT

## 2023-11-17 PROCEDURE — 85610 PROTHROMBIN TIME: CPT

## 2023-11-17 PROCEDURE — 93005 ELECTROCARDIOGRAM TRACING: CPT

## 2023-11-17 PROCEDURE — 85730 THROMBOPLASTIN TIME PARTIAL: CPT

## 2023-11-17 PROCEDURE — 71046 X-RAY EXAM CHEST 2 VIEWS: CPT

## 2023-11-17 NOTE — ANESTHESIA PREPROCEDURE EVALUATION
Anesthesia Evaluation     Patient summary reviewed   no history of anesthetic complications:   NPO Solid Status: > 8 hours             Airway   Mallampati: II  TM distance: >3 FB  No difficulty expected  Dental      Pulmonary - negative pulmonary ROS   (-) asthma, sleep apnea, not a smoker  Cardiovascular   Exercise tolerance: poor (<4 METS)    (+) hypertension, valvular problems/murmurs (bicuspid aortic valve) AS and AI, hyperlipidemia  (-) CAD, angina, CHF, cardiac stents    ROS comment: Thoracic aneurysm    Cardiac cath:   Conclusion 11/7/2023        Normal left main coronary artery  Mid left anterior descending coronary artery has mild myocardial bridging otherwise normal  No significant disease of diagonal branches  Normal left circumflex coronary artery and obtuse marginal branches  Minimal atherosclerotic changes of right coronary artery which otherwise is normal  Normal right posterior descending coronary artery and right posterior lateral artery     Summary  Essentially normal coronary arteries  Severe aortic stenosis by echo    Echo:  ·  Left ventricular systolic function is normal. Left ventricular ejection fraction appears to be 61 - 65%.  ·  Left ventricular wall thickness is consistent with mild concentric hypertrophy.  ·  Left ventricular diastolic function was normal.  ·  Severe aortic valve stenosis is present.  ·  Estimated right ventricular systolic pressure from tricuspid regurgitation is normal (<35 mmHg).  ·  Mild dilation of the aortic root is present. Moderate dilation of the ascending aorta is present.  ·  Compared to 9/13/2022 echo aortic stenosis has increased from moderate to severe to now severe        Neuro/Psych- negative ROS  (-) seizures, TIA, CVA  GI/Hepatic/Renal/Endo    (-) liver disease, no renal disease, diabetes    Musculoskeletal (-) negative ROS    Abdominal    Substance History      OB/GYN          Other                      Anesthesia Plan    ASA 4     Beckie, CVL, PAC and  general     (No C/I to [facility])  intravenous induction     Anesthetic plan, risks, benefits, and alternatives have been provided, discussed and informed consent has been obtained with: patient.    Use of blood products discussed with patient  Consented to blood products.      CODE STATUS:

## 2023-11-17 NOTE — DISCHARGE INSTRUCTIONS
How to Use Chlorhexidine Before Surgery  Chlorhexidine gluconate (CHG) is a germ-killing (antiseptic) solution that is used to clean the skin. It can get rid of the bacteria that normally live on the skin and can keep them away for about 24 hours. To clean your skin with CHG, you may be given:  A CHG solution to use in the shower or as part of a sponge bath.  A prepackaged cloth that contains CHG.  Cleaning your skin with CHG may help lower the risk for infection:  While you are staying in the intensive care unit of the hospital.  If you have a vascular access, such as a central line, to provide short-term or long-term access to your veins.  If you have a catheter to drain urine from your bladder.  If you are on a ventilator. A ventilator is a machine that helps you breathe by moving air in and out of your lungs.  After surgery.  What are the risks?  Risks of using CHG include:  A skin reaction.  Hearing loss, if CHG gets in your ears and you have a perforated eardrum.  Eye injury, if CHG gets in your eyes and is not rinsed out.  The CHG product catching fire.  Make sure that you avoid smoking and flames after applying CHG to your skin.  Do not use CHG:  If you have a chlorhexidine allergy or have previously reacted to chlorhexidine.  On babies younger than 2 months of age.  How to use CHG solution  Use CHG only as told by your health care provider, and follow the instructions on the label.  Use the full amount of CHG as directed. Usually, this is one bottle.  During a shower    Follow these steps when using CHG solution during a shower (unless your health care provider gives you different instructions):  Start the shower.  Use your normal soap and shampoo to wash your face and hair.  Turn off the shower or move out of the shower stream.  Pour the CHG onto a clean washcloth. Do not use any type of brush or rough-edged sponge.  Starting at your neck, lather your body down to your toes. Make sure you follow these  instructions:  If you will be having surgery, pay special attention to the part of your body where you will be having surgery. Scrub this area for at least 1 minute.  Do not use CHG on your head or face. If the solution gets into your ears or eyes, rinse them well with water.  Avoid your genital area.  Avoid any areas of skin that have broken skin, cuts, or scrapes.  Scrub your back and under your arms. Make sure to wash skin folds.  Let the lather sit on your skin for 1-2 minutes or as long as told by your health care provider.  Thoroughly rinse your entire body in the shower. Make sure that all body creases and crevices are rinsed well.  Dry off with a clean towel. Do not put any substances on your body afterward--such as powder, lotion, or perfume--unless you are told to do so by your health care provider. Only use lotions that are recommended by the .  Put on clean clothes or pajamas.  If it is the night before your surgery, sleep in clean sheets.     During a sponge bath  Follow these steps when using CHG solution during a sponge bath (unless your health care provider gives you different instructions):  Use your normal soap and shampoo to wash your face and hair.  Pour the CHG onto a clean washcloth.  Starting at your neck, lather your body down to your toes. Make sure you follow these instructions:  If you will be having surgery, pay special attention to the part of your body where you will be having surgery. Scrub this area for at least 1 minute.  Do not use CHG on your head or face. If the solution gets into your ears or eyes, rinse them well with water.  Avoid your genital area.  Avoid any areas of skin that have broken skin, cuts, or scrapes.  Scrub your back and under your arms. Make sure to wash skin folds.  Let the lather sit on your skin for 1-2 minutes or as long as told by your health care provider.  Using a different clean, wet washcloth, thoroughly rinse your entire body. Make sure that  all body creases and crevices are rinsed well.  Dry off with a clean towel. Do not put any substances on your body afterward--such as powder, lotion, or perfume--unless you are told to do so by your health care provider. Only use lotions that are recommended by the .  Put on clean clothes or pajamas.  If it is the night before your surgery, sleep in clean sheets.  How to use CHG prepackaged cloths  Only use CHG cloths as told by your health care provider, and follow the instructions on the label.  Use the CHG cloth on clean, dry skin.  Do not use the CHG cloth on your head or face unless your health care provider tells you to.  When washing with the CHG cloth:  Avoid your genital area.  Avoid any areas of skin that have broken skin, cuts, or scrapes.  Before surgery    Follow these steps when using a CHG cloth to clean before surgery (unless your health care provider gives you different instructions):  Using the CHG cloth, vigorously scrub the part of your body where you will be having surgery. Scrub using a back-and-forth motion for 3 minutes. The area on your body should be completely wet with CHG when you are done scrubbing.  Do not rinse. Discard the cloth and let the area air-dry. Do not put any substances on the area afterward, such as powder, lotion, or perfume.  Put on clean clothes or pajamas.  If it is the night before your surgery, sleep in clean sheets.     For general bathing  Follow these steps when using CHG cloths for general bathing (unless your health care provider gives you different instructions).  Use a separate CHG cloth for each area of your body. Make sure you wash between any folds of skin and between your fingers and toes. Wash your body in the following order, switching to a new cloth after each step:  The front of your neck, shoulders, and chest.  Both of your arms, under your arms, and your hands.  Your stomach and groin area, avoiding the genitals.  Your right leg and  foot.  Your left leg and foot.  The back of your neck, your back, and your buttocks.  Do not rinse. Discard the cloth and let the area air-dry. Do not put any substances on your body afterward--such as powder, lotion, or perfume--unless you are told to do so by your health care provider. Only use lotions that are recommended by the .  Put on clean clothes or pajamas.  Contact a health care provider if:  Your skin gets irritated after scrubbing.  You have questions about using your solution or cloth.  You swallow any chlorhexidine. Call your local poison control center (1-861.883.3222 in the [place]  Get help right away if:  Your eyes itch badly, or they become very red or swollen.  Your skin itches badly and is red or swollen.  Your hearing changes.  You have trouble seeing.  You have swelling or tingling in your mouth or throat.  You have trouble breathing.  These symptoms may represent a serious problem that is an emergency. Do not wait to see if the symptoms will go away. Get medical help right away. Call your local emergency services (599 in the [place] Do not drive yourself to the hospital.  Summary  Chlorhexidine gluconate (CHG) is a germ-killing (antiseptic) solution that is used to clean the skin. Cleaning your skin with CHG may help to lower your risk for infection.  You may be given CHG to use for bathing. It may be in a bottle or in a prepackaged cloth to use on your skin. Carefully follow your health care provider's instructions and the instructions on the product label.  Do not use CHG if you have a chlorhexidine allergy.  Contact your health care provider if your skin gets irritated after scrubbing.  This information is not intended to replace advice given to you by your health care provider. Make sure you discuss any questions you have with your health care provider.  Document Revised: 04/17/2023 Document Reviewed: 02/28/2022  Elsevier Patient Education © 2023 [employer]      Before you  come to the hospital        Arrival time: AS DIRECTED BY OFFICE     YOU MAY TAKE THE FOLLOWING MEDICATION(S) THE MORNING OF SURGERY WITH A SIP OF WATER: ***           ALL OTHER HOME MEDICATION CHECK WITH YOUR PHYSICIAN (especially if   you are taking diabetes medicines or blood thinners)    Do not take any Erectile Dysfunction medications (EX: CIALIS, VIAGRA) 24 hours prior to surgery.      If you were given and instructed to use a germ- killing soap, use as directed the night before surgery and again the morning of surgery or as directed by your surgeon. (Use one-half of the bottle with each shower.)   See attached information for How to Use Chlorhexidine for Bathing if applicable.            Eating and drinking restrictions prior to scheduled arrival time    2 Hours before arrival time STOP   Drinking Clear liquids (water, black coffee-NO CREAM,  apple juice-no pulp)    Clear Liquids    Water and flavored water                                                                      Clear Fruit juices, such as cranberry juice and apple juice.  Black coffee (NO cream of any kind, including powdered).  Plain tea  Clear bouillon or broth.  Flavored gelatin.  Soda.  Gatorade or Powerade.    8 Hours before arrival time STOP   All food, full liquids, and dairy products  Full liquid examples  Juices that have pulp.  Frozen ice pops that contain fruit pieces.  Coffee with creamer  Milk.  Yogurt.    (It is extremely important that you follow these guidelines to prevent delay or cancelation of your procedure)                       MANAGING PAIN AFTER SURGERY    We know you are probably wondering what your pain will be like after surgery.  Following surgery it is unrealistic to expect you will not have pain.   Pain is how our bodies let us know that something is wrong or cautions us to be careful.  That said, our goal is to make your pain tolerable.    Methods we may use to treat your pain include (oral or IV medications, PCAs,  epidurals, nerve blocks, etc.)   While some procedures require IV pain medications for a short time after surgery, transitioning to pain medications by mouth allows for better management of pain.   Your nurse will encourage you to take oral pain medications whenever possible.  IV medications work almost immediately, but only last a short while.  Taking medications by mouth allows for a more constant level of medication in your blood stream for a longer period of time.      Once your pain is out of control it is harder to get back under control.  It is important you are aware when your next dose of pain medication is due.  If you are admitted, your nurse may write the time of your next dose on the white board in your room to help you remember.      We are interested in your pain and encourage you to inform us about aggravating factors during your visit.   Many times a simple repositioning every few hours can make a big difference.    If your physician says it is okay, do not let your pain prevent you from getting out of bed. Be sure to call your nurse for assistance prior to getting up so you do not fall.      Before surgery, please decide your tolerable pain goal.  These faces help describe the pain ratings we use on a 0-10 scale.   Be prepared to tell us your goal and whether or not you take pain or anxiety medications at home.          Preparing for Surgery  Preparing for surgery is an important part of your care. It can make things go more smoothly and help you avoid complications. The steps leading up to surgery may vary among hospitals. Follow all instructions given to you by your health care providers. Ask questions if you do not understand something. Talk about any concerns that you have.  Here are some questions to consider asking before your surgery:  If my surgery is not an emergency (is elective), when would be the best time to have the surgery?  What arrangements do I need to make for work, home, or  school?  What will my recovery be like? How long will it be before I can return to normal activities?  Will I need to prepare my home? Will I need to arrange care for me or my children?  Should I expect to have pain after surgery? What are my pain management options? Are there nonmedical options that I can try for pain?  Tell a health care provider about:  Any allergies you have.  All medicines you are taking, including vitamins, herbs, eye drops, creams, and over-the-counter medicines.  Any problems you or family members have had with anesthetic medicines.  Any blood disorders you have.  Any surgeries you have had.  Any medical conditions you have.  Whether you are pregnant or may be pregnant.  What are the risks?  The risks and complications of surgery depend on the specific procedure that you have. Discuss all the risks with your health care providers before your surgery. Ask about common surgical complications, which may include:  Infection.  Bleeding or a need for blood replacement (transfusion).  Allergic reactions to medicines.  Damage to surrounding nerves, tissues, or structures.  A blood clot.  Scarring.  Failure of the surgery to correct the problem.  Follow these instructions before the procedure:  Several days or weeks before your procedure  You may have a physical exam by your primary health care provider to make sure it is safe for you to have surgery.  You may have testing. This may include a chest X-ray, blood and urine tests, electrocardiogram (ECG), or other testing.  Ask your health care provider about:  Changing or stopping your regular medicines. This is especially important if you are taking diabetes medicines or blood thinners.  Taking medicines such as aspirin and ibuprofen. These medicines can thin your blood. Do not take these medicines unless your health care provider tells you to take them.  Taking over-the-counter medicines, vitamins, herbs, and supplements.  Do not use any products  that contain nicotine or tobacco, such as cigarettes and e-cigarettes. If you need help quitting, ask your health care provider.  Avoid alcohol.  Ask your health care provider if there are exercises you can do to prepare for surgery.  Eat a healthy diet.   Plan to have someone 18 years of age or older to take you home from the hospital. We will need to verify your ride on the morning of surgery if you are being discharged home on the same day. Tell your ride to be expecting a call from the hospital prior to your procedure.   Plan to have a responsible adult care for you for at least 24 hours after you leave the hospital or clinic. This is important.  The day before your procedure  You may be given antibiotic medicine to take by mouth to help prevent infection. Take it as told by your health care provider.  You may be asked to shower with a germ-killing soap.  Follow instructions from your health care provider about eating and drinking restrictions. This includes gum, mints and hard candy.  Pack comfortable clothes according to your procedure.   The day of your procedure  You may need to take another shower with a germ-killing soap before you leave home in the morning.  With a small sip of water, take only the medicines that you are told to take.  Remove all jewelry including rings.   Leave anything you consider valuable at home except hearing aids if needed.  You do not need to bring your home medications into the hospital.   Do not wear any makeup, nail polish, powder, deodorant, lotion, hair accessories, or anything on your skin or body except your clothes.  If you will be staying in the hospital, bring a case to hold your glasses, contacts, or dentures. You may also want to bring your robe and non-skid footwear.       (Do not use denture adhesives since you will be asked to remove them during  surgery).   If you wear oxygen at home, bring it with you the day of surgery.  If instructed by your health care  provider, bring your sleep apnea device with you on the day of your surgery (if this applies to you).  You may want to leave your suitcase and sleep apnea device in the car until after surgery.   Arrive at the hospital as scheduled.  Bring a friend or family member with you who can help to answer questions and be present while you meet with your health care provider.  At the hospital  When you arrive at the hospital:  Go to registration located at the main entrance of the hospital. You will be registered and given a beeper and a sticker sheet. Take the stickers to the Outpatient nurses desk and place in the black tray. This is to notify staff that you have arrived. Then return to the lobby to wait.   When your beeper lights up and vibrates proceed through the double doors, under the stairs, and a member of the Outpatient Surgery staff will escort you to your preoperative room.  You may have to wear compression sleeves. These help to prevent blood clots and reduce swelling in your legs.  An IV may be inserted into one of your veins.              In the operating room, you may be given one or more of the following:        A medicine to help you relax (sedative).        A medicine to numb the area (local anesthetic).        A medicine to make you fall asleep (general anesthetic).        A medicine that is injected into an area of your body to numb everything below the                      injection site (regional anesthetic).  You may be given an antibiotic through your IV to help prevent infection.  Your surgical site will be marked or identified.    Contact a health care provider if you:  Develop a fever of more than 100.4°F (38°C) or other feelings of illness during the 48 hours before your surgery.  Have symptoms that get worse.  Have questions or concerns about your surgery.  Summary  Preparing for surgery can make the procedure go more smoothly and lower your risk of complications.  Before surgery, make a list of  questions and concerns to discuss with your surgeon. Ask about the risks and possible complications.  In the days or weeks before your surgery, follow all instructions from your health care provider. You may need to stop smoking, avoid alcohol, follow eating restrictions, and change or stop your regular medicines.  Contact your surgeon if you develop a fever or other signs of illness during the few days before your surgery.  This information is not intended to replace advice given to you by your health care provider. Make sure you discuss any questions you have with your health care provider.  Document Revised: 12/21/2018 Document Reviewed: 10/23/2018  Elsevier Patient Education © 2021 [employer]          89-year-old male past medical history as documented found on the floor of his residence this morning.  GCS 12 on arrival.  Patient with UTI and sepsis.  Sepsis treatment initiated.  Trauma evaluation revealed left clavicle fracture and sling applied.  Trauma alert on arrival.  Critical care consulted and patient admitted to the stepdown unit

## 2023-11-20 LAB
QT INTERVAL: 396 MS
QTC INTERVAL: 392 MS

## 2023-11-21 ENCOUNTER — HOSPITAL ENCOUNTER (INPATIENT)
Facility: HOSPITAL | Age: 63
LOS: 5 days | Discharge: HOME | DRG: 220 | End: 2023-11-26
Attending: SURGERY | Admitting: SURGERY
Payer: COMMERCIAL

## 2023-11-21 ENCOUNTER — APPOINTMENT (OUTPATIENT)
Dept: CARDIOLOGY | Facility: HOSPITAL | Age: 63
DRG: 220 | End: 2023-11-21
Payer: COMMERCIAL

## 2023-11-21 ENCOUNTER — APPOINTMENT (OUTPATIENT)
Dept: GENERAL RADIOLOGY | Facility: HOSPITAL | Age: 63
DRG: 220 | End: 2023-11-21
Payer: COMMERCIAL

## 2023-11-21 ENCOUNTER — ANESTHESIA (OUTPATIENT)
Dept: PERIOP | Facility: HOSPITAL | Age: 63
End: 2023-11-21
Payer: COMMERCIAL

## 2023-11-21 DIAGNOSIS — I71.20 THORACIC AORTIC ANEURYSM WITHOUT RUPTURE, UNSPECIFIED PART: ICD-10-CM

## 2023-11-21 DIAGNOSIS — Z74.09 IMPAIRED MOBILITY: Primary | ICD-10-CM

## 2023-11-21 LAB
ABO GROUP BLD: NORMAL
ALBUMIN SERPL-MCNC: 4.1 G/DL (ref 3.5–5.2)
ALBUMIN SERPL-MCNC: 4.3 G/DL (ref 3.5–5.2)
ANION GAP SERPL CALCULATED.3IONS-SCNC: 6 MMOL/L (ref 5–15)
ANION GAP SERPL CALCULATED.3IONS-SCNC: 8 MMOL/L (ref 5–15)
APTT PPP: 30.7 SECONDS (ref 24.5–36)
APTT PPP: 31.8 SECONDS (ref 24.5–36)
APTT PPP: 34.9 SECONDS (ref 24.5–36)
ARTERIAL PATENCY WRIST A: ABNORMAL
ATMOSPHERIC PRESS: 749 MMHG
ATMOSPHERIC PRESS: 751 MMHG
BASE EXCESS BLDA CALC-SCNC: -0.1 MMOL/L (ref 0–2)
BASE EXCESS BLDA CALC-SCNC: -0.4 MMOL/L (ref 0–2)
BASE EXCESS BLDA CALC-SCNC: -0.5 MMOL/L (ref 0–2)
BASE EXCESS BLDA CALC-SCNC: -0.9 MMOL/L (ref 0–2)
BASE EXCESS BLDA CALC-SCNC: -1.4 MMOL/L (ref 0–2)
BASE EXCESS BLDA CALC-SCNC: 0 MMOL/L (ref 0–2)
BASE EXCESS BLDA CALC-SCNC: 0.2 MMOL/L (ref 0–2)
BASE EXCESS BLDA CALC-SCNC: 1.1 MMOL/L (ref 0–2)
BASOPHILS # BLD AUTO: 0.02 10*3/MM3 (ref 0–0.2)
BASOPHILS # BLD AUTO: 0.02 10*3/MM3 (ref 0–0.2)
BASOPHILS NFR BLD AUTO: 0.2 % (ref 0–1.5)
BASOPHILS NFR BLD AUTO: 0.3 % (ref 0–1.5)
BDY SITE: ABNORMAL
BLD GP AB SCN SERPL QL: NEGATIVE
BODY TEMPERATURE: 37
BUN SERPL-MCNC: 16 MG/DL (ref 8–23)
BUN SERPL-MCNC: 17 MG/DL (ref 8–23)
BUN/CREAT SERPL: 15.5 (ref 7–25)
BUN/CREAT SERPL: 15.9 (ref 7–25)
CA-I BLD-MCNC: 4.41 MG/DL (ref 4.6–5.4)
CA-I BLD-MCNC: 4.43 MG/DL (ref 4.6–5.4)
CA-I BLD-MCNC: 4.5 MG/DL (ref 4.6–5.4)
CA-I BLD-MCNC: 4.52 MG/DL (ref 4.6–5.4)
CA-I BLD-MCNC: 4.53 MG/DL (ref 4.6–5.4)
CA-I BLD-MCNC: 4.98 MG/DL (ref 4.6–5.4)
CA-I BLD-MCNC: 5.24 MG/DL (ref 4.6–5.4)
CA-I BLD-MCNC: 5.25 MG/DL (ref 4.6–5.4)
CA-I BLDA-SCNC: 5.05 MMOL/L (ref 4.6–5.4)
CALCIUM SPEC-SCNC: 9.3 MG/DL (ref 8.6–10.5)
CALCIUM SPEC-SCNC: 9.7 MG/DL (ref 8.6–10.5)
CHLORIDE SERPL-SCNC: 106 MMOL/L (ref 98–107)
CHLORIDE SERPL-SCNC: 109 MMOL/L (ref 98–107)
CO2 SERPL-SCNC: 27 MMOL/L (ref 22–29)
CO2 SERPL-SCNC: 27 MMOL/L (ref 22–29)
COHGB MFR BLD: 0.6 % (ref 0–5)
COHGB MFR BLD: 1 % (ref 0–5)
COHGB MFR BLD: 1.1 % (ref 0–5)
COHGB MFR BLD: 1.2 % (ref 0–5)
COHGB MFR BLD: 1.6 % (ref 0–5)
CREAT SERPL-MCNC: 1.03 MG/DL (ref 0.76–1.27)
CREAT SERPL-MCNC: 1.07 MG/DL (ref 0.76–1.27)
D-LACTATE SERPL-SCNC: 1.7 MMOL/L (ref 0.5–2)
DEPRECATED RDW RBC AUTO: 41.9 FL (ref 37–54)
DEPRECATED RDW RBC AUTO: 42.2 FL (ref 37–54)
DEPRECATED RDW RBC AUTO: 42.5 FL (ref 37–54)
EGFRCR SERPLBLD CKD-EPI 2021: 78.5 ML/MIN/1.73
EGFRCR SERPLBLD CKD-EPI 2021: 82.1 ML/MIN/1.73
EOSINOPHIL # BLD AUTO: 0.05 10*3/MM3 (ref 0–0.4)
EOSINOPHIL # BLD AUTO: 0.05 10*3/MM3 (ref 0–0.4)
EOSINOPHIL NFR BLD AUTO: 0.5 % (ref 0.3–6.2)
EOSINOPHIL NFR BLD AUTO: 0.6 % (ref 0.3–6.2)
ERYTHROCYTE [DISTWIDTH] IN BLOOD BY AUTOMATED COUNT: 13 % (ref 12.3–15.4)
ERYTHROCYTE [DISTWIDTH] IN BLOOD BY AUTOMATED COUNT: 13.1 % (ref 12.3–15.4)
ERYTHROCYTE [DISTWIDTH] IN BLOOD BY AUTOMATED COUNT: 13.2 % (ref 12.3–15.4)
FIBRINOGEN PPP-MCNC: 168 MG/DL (ref 240–460)
FIBRINOGEN PPP-MCNC: 205 MG/DL (ref 240–460)
FIBRINOGEN PPP-MCNC: 242 MG/DL (ref 240–460)
FSP PPP LA-ACNC: NORMAL
GAS FLOW AIRWAY: 1.8 LPM
GAS FLOW AIRWAY: 2.5 LPM
GAS FLOW AIRWAY: 2.8 LPM
GLUCOSE BLDC GLUCOMTR-MCNC: 158 MG/DL (ref 70–130)
GLUCOSE BLDC GLUCOMTR-MCNC: 160 MG/DL (ref 70–130)
GLUCOSE BLDC GLUCOMTR-MCNC: 161 MG/DL (ref 70–130)
GLUCOSE BLDC GLUCOMTR-MCNC: 164 MG/DL (ref 70–130)
GLUCOSE BLDC GLUCOMTR-MCNC: 173 MG/DL (ref 70–130)
GLUCOSE SERPL-MCNC: 154 MG/DL (ref 65–99)
GLUCOSE SERPL-MCNC: 172 MG/DL (ref 65–99)
HCO3 BLDA-SCNC: 24 MMOL/L (ref 20–26)
HCO3 BLDA-SCNC: 24.2 MMOL/L (ref 20–26)
HCO3 BLDA-SCNC: 24.2 MMOL/L (ref 20–26)
HCO3 BLDA-SCNC: 25 MMOL/L (ref 20–26)
HCO3 BLDA-SCNC: 25.2 MMOL/L (ref 20–26)
HCO3 BLDA-SCNC: 25.6 MMOL/L (ref 20–26)
HCO3 BLDA-SCNC: 25.8 MMOL/L (ref 20–26)
HCO3 BLDA-SCNC: 27.5 MMOL/L (ref 20–26)
HCT VFR BLD AUTO: 26.4 % (ref 37.5–51)
HCT VFR BLD AUTO: 27.7 % (ref 37.5–51)
HCT VFR BLD AUTO: 29.3 % (ref 37.5–51)
HCT VFR BLD CALC: 25.3 % (ref 38–51)
HCT VFR BLD CALC: 25.6 % (ref 38–51)
HCT VFR BLD CALC: 25.6 % (ref 38–51)
HCT VFR BLD CALC: 26.5 % (ref 38–51)
HCT VFR BLD CALC: 28.7 % (ref 38–51)
HCT VFR BLD CALC: 29.8 % (ref 38–51)
HCT VFR BLD CALC: 30.3 % (ref 38–51)
HCT VFR BLD CALC: 30.6 % (ref 38–51)
HGB BLD-MCNC: 8.4 G/DL (ref 13–17.7)
HGB BLD-MCNC: 8.9 G/DL (ref 13–17.7)
HGB BLD-MCNC: 9.3 G/DL (ref 13–17.7)
HGB BLDA-MCNC: 10 G/DL (ref 14–18)
HGB BLDA-MCNC: 8.3 G/DL (ref 14–18)
HGB BLDA-MCNC: 8.7 G/DL (ref 14–18)
HGB BLDA-MCNC: 9.4 G/DL (ref 14–18)
HGB BLDA-MCNC: 9.7 G/DL (ref 14–18)
HGB BLDA-MCNC: 9.9 G/DL (ref 14–18)
INHALED O2 CONCENTRATION: 100 %
INHALED O2 CONCENTRATION: 45 %
INHALED O2 CONCENTRATION: 50 %
INHALED O2 CONCENTRATION: 60 %
INHALED O2 CONCENTRATION: 60 %
INHALED O2 CONCENTRATION: 90 %
INR PPP: 1.29 (ref 0.91–1.09)
INR PPP: 1.47 (ref 0.91–1.09)
INR PPP: 1.6 (ref 0.91–1.09)
LYMPHOCYTES # BLD AUTO: 1.09 10*3/MM3 (ref 0.7–3.1)
LYMPHOCYTES # BLD AUTO: 1.37 10*3/MM3 (ref 0.7–3.1)
LYMPHOCYTES NFR BLD AUTO: 14 % (ref 19.6–45.3)
LYMPHOCYTES NFR BLD AUTO: 14.9 % (ref 19.6–45.3)
Lab: ABNORMAL
Lab: NORMAL
MCH RBC QN AUTO: 27.9 PG (ref 26.6–33)
MCH RBC QN AUTO: 28 PG (ref 26.6–33)
MCH RBC QN AUTO: 28.3 PG (ref 26.6–33)
MCHC RBC AUTO-ENTMCNC: 31.7 G/DL (ref 31.5–35.7)
MCHC RBC AUTO-ENTMCNC: 31.8 G/DL (ref 31.5–35.7)
MCHC RBC AUTO-ENTMCNC: 32.1 G/DL (ref 31.5–35.7)
MCV RBC AUTO: 88 FL (ref 79–97)
MCV RBC AUTO: 88 FL (ref 79–97)
MCV RBC AUTO: 88.2 FL (ref 79–97)
METHGB BLD QL: 0 % (ref 0–3)
METHGB BLD QL: 0.5 % (ref 0–3)
METHGB BLD QL: 0.6 % (ref 0–3)
METHGB BLD QL: 1 % (ref 0–3)
METHGB BLD QL: 1 % (ref 0–3)
METHGB BLD QL: 1.1 % (ref 0–3)
METHGB BLD QL: 1.1 % (ref 0–3)
MODALITY: ABNORMAL
MONOCYTES # BLD AUTO: 0.28 10*3/MM3 (ref 0.1–0.9)
MONOCYTES # BLD AUTO: 0.4 10*3/MM3 (ref 0.1–0.9)
MONOCYTES NFR BLD AUTO: 3.6 % (ref 5–12)
MONOCYTES NFR BLD AUTO: 4.3 % (ref 5–12)
NEUTROPHILS NFR BLD AUTO: 6.29 10*3/MM3 (ref 1.7–7)
NEUTROPHILS NFR BLD AUTO: 7.31 10*3/MM3 (ref 1.7–7)
NEUTROPHILS NFR BLD AUTO: 79.4 % (ref 42.7–76)
NEUTROPHILS NFR BLD AUTO: 80.7 % (ref 42.7–76)
OXYHGB MFR BLDV: 98.3 % (ref 94–99)
OXYHGB MFR BLDV: 98.3 % (ref 94–99)
OXYHGB MFR BLDV: 98.4 % (ref 94–99)
OXYHGB MFR BLDV: 98.4 % (ref 94–99)
OXYHGB MFR BLDV: 98.6 % (ref 94–99)
OXYHGB MFR BLDV: 98.7 % (ref 94–99)
OXYHGB MFR BLDV: 98.9 % (ref 94–99)
PCO2 BLDA: 36.1 MM HG (ref 35–45)
PCO2 BLDA: 41.1 MM HG (ref 35–45)
PCO2 BLDA: 42.3 MM HG (ref 35–45)
PCO2 BLDA: 43.8 MM HG (ref 35–45)
PCO2 BLDA: 43.8 MM HG (ref 35–45)
PCO2 BLDA: 44.2 MM HG (ref 35–45)
PCO2 BLDA: 48.4 MM HG (ref 35–45)
PCO2 BLDA: 51.7 MM HG (ref 35–45)
PCO2 TEMP ADJ BLD: 36.1 MM HG (ref 35–45)
PCO2 TEMP ADJ BLD: 41.1 MM HG (ref 35–45)
PCO2 TEMP ADJ BLD: 42.3 MM HG (ref 35–45)
PCO2 TEMP ADJ BLD: 43.8 MM HG (ref 35–45)
PCO2 TEMP ADJ BLD: 43.8 MM HG (ref 35–45)
PCO2 TEMP ADJ BLD: 44.2 MM HG (ref 35–45)
PCO2 TEMP ADJ BLD: 48.4 MM HG (ref 35–45)
PCO2 TEMP ADJ BLD: 51.7 MM HG (ref 35–45)
PEEP RESPIRATORY: 10 CM[H2O]
PEEP RESPIRATORY: 8 CM[H2O]
PH BLDA: 7.33 PH UNITS (ref 7.35–7.45)
PH BLDA: 7.33 PH UNITS (ref 7.35–7.45)
PH BLDA: 7.35 PH UNITS (ref 7.35–7.45)
PH BLDA: 7.36 PH UNITS (ref 7.35–7.45)
PH BLDA: 7.37 PH UNITS (ref 7.35–7.45)
PH BLDA: 7.38 PH UNITS (ref 7.35–7.45)
PH BLDA: 7.38 PH UNITS (ref 7.35–7.45)
PH BLDA: 7.43 PH UNITS (ref 7.35–7.45)
PH, TEMP CORRECTED: 7.33 PH UNITS (ref 7.35–7.45)
PH, TEMP CORRECTED: 7.33 PH UNITS (ref 7.35–7.45)
PH, TEMP CORRECTED: 7.35 PH UNITS (ref 7.35–7.45)
PH, TEMP CORRECTED: 7.36 PH UNITS (ref 7.35–7.45)
PH, TEMP CORRECTED: 7.37 PH UNITS (ref 7.35–7.45)
PH, TEMP CORRECTED: 7.38 PH UNITS (ref 7.35–7.45)
PH, TEMP CORRECTED: 7.38 PH UNITS (ref 7.35–7.45)
PH, TEMP CORRECTED: 7.43 PH UNITS (ref 7.35–7.45)
PHOSPHATE SERPL-MCNC: 3.1 MG/DL (ref 2.5–4.5)
PHOSPHATE SERPL-MCNC: 4 MG/DL (ref 2.5–4.5)
PLATELET # BLD AUTO: 117 10*3/MM3 (ref 140–450)
PLATELET # BLD AUTO: 122 10*3/MM3 (ref 140–450)
PLATELET # BLD AUTO: 71 10*3/MM3 (ref 140–450)
PMV BLD AUTO: 11.3 FL (ref 6–12)
PMV BLD AUTO: 11.6 FL (ref 6–12)
PMV BLD AUTO: 12.3 FL (ref 6–12)
PO2 BLDA: 196 MM HG (ref 83–108)
PO2 BLDA: 236 MM HG (ref 83–108)
PO2 BLDA: 237 MM HG (ref 83–108)
PO2 BLDA: 269 MM HG (ref 83–108)
PO2 BLDA: 277 MM HG (ref 83–108)
PO2 BLDA: 378 MM HG (ref 83–108)
PO2 BLDA: 400 MM HG (ref 83–108)
PO2 BLDA: 442 MM HG (ref 83–108)
PO2 TEMP ADJ BLD: 196 MM HG (ref 83–108)
PO2 TEMP ADJ BLD: 236 MM HG (ref 83–108)
PO2 TEMP ADJ BLD: 237 MM HG (ref 83–108)
PO2 TEMP ADJ BLD: 269 MM HG (ref 83–108)
PO2 TEMP ADJ BLD: 277 MM HG (ref 83–108)
PO2 TEMP ADJ BLD: 378 MM HG (ref 83–108)
PO2 TEMP ADJ BLD: 400 MM HG (ref 83–108)
PO2 TEMP ADJ BLD: 442 MM HG (ref 83–108)
POTASSIUM BLDA-SCNC: 3.8 MMOL/L (ref 3.5–5.2)
POTASSIUM BLDA-SCNC: 3.9 MMOL/L (ref 3.5–5.2)
POTASSIUM BLDA-SCNC: 3.9 MMOL/L (ref 3.5–5.2)
POTASSIUM BLDA-SCNC: 4 MMOL/L (ref 3.5–5.2)
POTASSIUM BLDA-SCNC: 4.1 MMOL/L (ref 3.5–5.2)
POTASSIUM BLDA-SCNC: 4.1 MMOL/L (ref 3.5–5.2)
POTASSIUM BLDA-SCNC: 4.4 MMOL/L (ref 3.5–5.2)
POTASSIUM BLDA-SCNC: 4.5 MMOL/L (ref 3.5–5.2)
POTASSIUM SERPL-SCNC: 4.1 MMOL/L (ref 3.5–5.2)
POTASSIUM SERPL-SCNC: 4.4 MMOL/L (ref 3.5–5.2)
PROTHROMBIN TIME: 16.3 SECONDS (ref 11.8–14.8)
PROTHROMBIN TIME: 18 SECONDS (ref 11.8–14.8)
PROTHROMBIN TIME: 19.3 SECONDS (ref 11.8–14.8)
PSV: 10 CMH2O
PSV: 10 CMH2O
RBC # BLD AUTO: 3 10*6/MM3 (ref 4.14–5.8)
RBC # BLD AUTO: 3.14 10*6/MM3 (ref 4.14–5.8)
RBC # BLD AUTO: 3.33 10*6/MM3 (ref 4.14–5.8)
RH BLD: NEGATIVE
SAO2 % BLDCOA: 100 % (ref 94–99)
SAO2 % BLDCOA: 99.8 % (ref 94–99)
SAO2 % BLDCOA: >100.1 % (ref 94–99)
SET MECH RESP RATE: 20
SET MECH RESP RATE: 20
SODIUM BLDA-SCNC: 140 MMOL/L (ref 136–145)
SODIUM BLDA-SCNC: 141 MMOL/L (ref 136–145)
SODIUM BLDA-SCNC: 141 MMOL/L (ref 136–145)
SODIUM BLDA-SCNC: 143 MMOL/L (ref 136–145)
SODIUM SERPL-SCNC: 141 MMOL/L (ref 136–145)
SODIUM SERPL-SCNC: 142 MMOL/L (ref 136–145)
T&S EXPIRATION DATE: NORMAL
VENTILATOR MODE: ABNORMAL
VT ON VENT VENT: 550 ML
VT ON VENT VENT: 550 ML
WBC NRBC COR # BLD AUTO: 10.21 10*3/MM3 (ref 3.4–10.8)
WBC NRBC COR # BLD AUTO: 7.79 10*3/MM3 (ref 3.4–10.8)
WBC NRBC COR # BLD AUTO: 9.21 10*3/MM3 (ref 3.4–10.8)

## 2023-11-21 PROCEDURE — 88313 SPECIAL STAINS GROUP 2: CPT | Performed by: SURGERY

## 2023-11-21 PROCEDURE — 25010000002 MANNITOL PER 50 ML

## 2023-11-21 PROCEDURE — 25810000003 LACTATED RINGERS PER 1000 ML

## 2023-11-21 PROCEDURE — 85362 FIBRIN DEGRADATION PRODUCTS: CPT | Performed by: SURGERY

## 2023-11-21 PROCEDURE — 94799 UNLISTED PULMONARY SVC/PX: CPT

## 2023-11-21 PROCEDURE — 25010000002 HEPARIN (PORCINE) PER 1000 UNITS: Performed by: NURSE ANESTHETIST, CERTIFIED REGISTERED

## 2023-11-21 PROCEDURE — 25010000002 HEPARIN (PORCINE) PER 1000 UNITS: Performed by: SURGERY

## 2023-11-21 PROCEDURE — 25010000002 ALBUMIN HUMAN 25% PER 50 ML

## 2023-11-21 PROCEDURE — 83605 ASSAY OF LACTIC ACID: CPT | Performed by: SURGERY

## 2023-11-21 PROCEDURE — C1889 IMPLANT/INSERT DEVICE, NOC: HCPCS | Performed by: SURGERY

## 2023-11-21 PROCEDURE — C1751 CATH, INF, PER/CENT/MIDLINE: HCPCS | Performed by: NURSE ANESTHETIST, CERTIFIED REGISTERED

## 2023-11-21 PROCEDURE — 82805 BLOOD GASES W/O2 SATURATION: CPT

## 2023-11-21 PROCEDURE — 02RX0JZ REPLACEMENT OF THORACIC AORTA, ASCENDING/ARCH WITH SYNTHETIC SUBSTITUTE, OPEN APPROACH: ICD-10-PCS | Performed by: SURGERY

## 2023-11-21 PROCEDURE — P9037 PLATE PHERES LEUKOREDU IRRAD: HCPCS

## 2023-11-21 PROCEDURE — 25010000002 HEPARIN (PORCINE) PER 1000 UNITS

## 2023-11-21 PROCEDURE — 33866 AORTIC HEMIARCH GRAFT: CPT | Performed by: SURGERY

## 2023-11-21 PROCEDURE — P9012 CRYOPRECIPITATE EACH UNIT: HCPCS

## 2023-11-21 PROCEDURE — 85027 COMPLETE CBC AUTOMATED: CPT | Performed by: SURGERY

## 2023-11-21 PROCEDURE — P9041 ALBUMIN (HUMAN),5%, 50ML: HCPCS | Performed by: NURSE ANESTHETIST, CERTIFIED REGISTERED

## 2023-11-21 PROCEDURE — P9035 PLATELET PHERES LEUKOREDUCED: HCPCS

## 2023-11-21 PROCEDURE — C9290 INJ, BUPIVACAINE LIPOSOME: HCPCS | Performed by: SURGERY

## 2023-11-21 PROCEDURE — 86923 COMPATIBILITY TEST ELECTRIC: CPT

## 2023-11-21 PROCEDURE — 86900 BLOOD TYPING SEROLOGIC ABO: CPT

## 2023-11-21 PROCEDURE — 88304 TISSUE EXAM BY PATHOLOGIST: CPT | Performed by: SURGERY

## 2023-11-21 PROCEDURE — 83050 HGB METHEMOGLOBIN QUAN: CPT

## 2023-11-21 PROCEDURE — 25010000002 ALBUMIN HUMAN 5% PER 50 ML: Performed by: NURSE ANESTHETIST, CERTIFIED REGISTERED

## 2023-11-21 PROCEDURE — 25010000002 PROTAMINE SULFATE PER 10 MG: Performed by: NURSE ANESTHETIST, CERTIFIED REGISTERED

## 2023-11-21 PROCEDURE — 25010000002 ALBUMIN HUMAN 5% PER 50 ML: Performed by: SURGERY

## 2023-11-21 PROCEDURE — 25010000002 MIDAZOLAM PER 1 MG: Performed by: ANESTHESIOLOGY

## 2023-11-21 PROCEDURE — 25810000003 LACTATED RINGERS PER 1000 ML: Performed by: ANESTHESIOLOGY

## 2023-11-21 PROCEDURE — 25010000002 PROPOFOL 10 MG/ML EMULSION: Performed by: NURSE ANESTHETIST, CERTIFIED REGISTERED

## 2023-11-21 PROCEDURE — 71045 X-RAY EXAM CHEST 1 VIEW: CPT

## 2023-11-21 PROCEDURE — 25810000003 SODIUM CHLORIDE 0.9 % SOLUTION: Performed by: SURGERY

## 2023-11-21 PROCEDURE — 93005 ELECTROCARDIOGRAM TRACING: CPT | Performed by: SURGERY

## 2023-11-21 PROCEDURE — 85384 FIBRINOGEN ACTIVITY: CPT | Performed by: SURGERY

## 2023-11-21 PROCEDURE — 25010000002 CEFAZOLIN PER 500 MG: Performed by: NURSE ANESTHETIST, CERTIFIED REGISTERED

## 2023-11-21 PROCEDURE — 25010000002 PROTAMINE SULFATE PER 10 MG

## 2023-11-21 PROCEDURE — 94761 N-INVAS EAR/PLS OXIMETRY MLT: CPT

## 2023-11-21 PROCEDURE — 86901 BLOOD TYPING SEROLOGIC RH(D): CPT | Performed by: SURGERY

## 2023-11-21 PROCEDURE — 25010000002 MORPHINE PER 10 MG: Performed by: SURGERY

## 2023-11-21 PROCEDURE — 82803 BLOOD GASES ANY COMBINATION: CPT

## 2023-11-21 PROCEDURE — 25010000002 VANCOMYCIN 10 G RECONSTITUTED SOLUTION: Performed by: SURGERY

## 2023-11-21 PROCEDURE — 82375 ASSAY CARBOXYHB QUANT: CPT

## 2023-11-21 PROCEDURE — 82948 REAGENT STRIP/BLOOD GLUCOSE: CPT

## 2023-11-21 PROCEDURE — 25010000002 METHYLPREDNISOLONE PER 125 MG: Performed by: NURSE ANESTHETIST, CERTIFIED REGISTERED

## 2023-11-21 PROCEDURE — C1768 GRAFT, VASCULAR: HCPCS | Performed by: SURGERY

## 2023-11-21 PROCEDURE — 25810000003 LACTATED RINGERS PER 1000 ML: Performed by: SURGERY

## 2023-11-21 PROCEDURE — 33863 ASCENDING AORTIC GRAFT: CPT | Performed by: SURGERY

## 2023-11-21 PROCEDURE — 0 INSULIN REGULAR HUMAN PER 5 UNITS: Performed by: SURGERY

## 2023-11-21 PROCEDURE — 82330 ASSAY OF CALCIUM: CPT

## 2023-11-21 PROCEDURE — 25810000003 SODIUM CHLORIDE 0.9 % SOLUTION 250 ML FLEX CONT: Performed by: SURGERY

## 2023-11-21 PROCEDURE — 86850 RBC ANTIBODY SCREEN: CPT | Performed by: SURGERY

## 2023-11-21 PROCEDURE — P9100 PATHOGEN TEST FOR PLATELETS: HCPCS

## 2023-11-21 PROCEDURE — 0 BUPIVACAINE LIPOSOME 1.3 % SUSPENSION 20 ML VIAL: Performed by: SURGERY

## 2023-11-21 PROCEDURE — 93325 DOPPLER ECHO COLOR FLOW MAPG: CPT | Performed by: INTERNAL MEDICINE

## 2023-11-21 PROCEDURE — 25810000003 SODIUM CHLORIDE 0.9 % SOLUTION

## 2023-11-21 PROCEDURE — 25010000002 ONDANSETRON PER 1 MG: Performed by: SURGERY

## 2023-11-21 PROCEDURE — P9047 ALBUMIN (HUMAN), 25%, 50ML: HCPCS

## 2023-11-21 PROCEDURE — 25010000002 MIDAZOLAM PER 1 MG: Performed by: NURSE ANESTHETIST, CERTIFIED REGISTERED

## 2023-11-21 PROCEDURE — 93312 ECHO TRANSESOPHAGEAL: CPT | Performed by: INTERNAL MEDICINE

## 2023-11-21 PROCEDURE — 88305 TISSUE EXAM BY PATHOLOGIST: CPT | Performed by: SURGERY

## 2023-11-21 PROCEDURE — 85730 THROMBOPLASTIN TIME PARTIAL: CPT | Performed by: SURGERY

## 2023-11-21 PROCEDURE — 94002 VENT MGMT INPAT INIT DAY: CPT

## 2023-11-21 PROCEDURE — 85610 PROTHROMBIN TIME: CPT | Performed by: SURGERY

## 2023-11-21 PROCEDURE — 80069 RENAL FUNCTION PANEL: CPT | Performed by: SURGERY

## 2023-11-21 PROCEDURE — 25010000002 POTASSIUM CHLORIDE PER 2 MEQ OF POTASSIUM

## 2023-11-21 PROCEDURE — 85025 COMPLETE CBC W/AUTO DIFF WBC: CPT | Performed by: SURGERY

## 2023-11-21 PROCEDURE — 86901 BLOOD TYPING SEROLOGIC RH(D): CPT

## 2023-11-21 PROCEDURE — 93010 ELECTROCARDIOGRAM REPORT: CPT | Performed by: INTERNAL MEDICINE

## 2023-11-21 PROCEDURE — 25810000003 DEXTROSE 5 % WITH KCL 20 MEQ 20 MEQ/L SOLUTION: Performed by: SURGERY

## 2023-11-21 PROCEDURE — 25010000002 PHENYLEPHRINE 10 MG/ML SOLUTION

## 2023-11-21 PROCEDURE — C1713 ANCHOR/SCREW BN/BN,TIS/BN: HCPCS | Performed by: SURGERY

## 2023-11-21 PROCEDURE — 86900 BLOOD TYPING SEROLOGIC ABO: CPT | Performed by: SURGERY

## 2023-11-21 PROCEDURE — 25010000002 ACETAMINOPHEN 10 MG/ML SOLUTION: Performed by: SURGERY

## 2023-11-21 PROCEDURE — 25010000002 VANCOMYCIN 1 G RECONSTITUTED SOLUTION 1 EACH VIAL: Performed by: SURGERY

## 2023-11-21 PROCEDURE — 36430 TRANSFUSION BLD/BLD COMPNT: CPT

## 2023-11-21 PROCEDURE — 86927 PLASMA FRESH FROZEN: CPT

## 2023-11-21 PROCEDURE — 25810000003 SODIUM CHLORIDE 0.9 % SOLUTION 250 ML FLEX CONT: Performed by: NURSE ANESTHETIST, CERTIFIED REGISTERED

## 2023-11-21 PROCEDURE — 25010000002 PHENYLEPHRINE 10 MG/ML SOLUTION: Performed by: NURSE ANESTHETIST, CERTIFIED REGISTERED

## 2023-11-21 PROCEDURE — 25010000002 CEFAZOLIN PER 500 MG: Performed by: NURSE PRACTITIONER

## 2023-11-21 PROCEDURE — 02RF0JZ REPLACEMENT OF AORTIC VALVE WITH SYNTHETIC SUBSTITUTE, OPEN APPROACH: ICD-10-PCS | Performed by: SURGERY

## 2023-11-21 PROCEDURE — 88311 DECALCIFY TISSUE: CPT | Performed by: SURGERY

## 2023-11-21 PROCEDURE — 5A1221Z PERFORMANCE OF CARDIAC OUTPUT, CONTINUOUS: ICD-10-PCS | Performed by: SURGERY

## 2023-11-21 PROCEDURE — 36600 WITHDRAWAL OF ARTERIAL BLOOD: CPT

## 2023-11-21 PROCEDURE — 93318 ECHO TRANSESOPHAGEAL INTRAOP: CPT | Performed by: NURSE ANESTHETIST, CERTIFIED REGISTERED

## 2023-11-21 PROCEDURE — 25010000002 FUROSEMIDE PER 20 MG

## 2023-11-21 PROCEDURE — C1760 CLOSURE DEV, VASC: HCPCS | Performed by: SURGERY

## 2023-11-21 PROCEDURE — P9041 ALBUMIN (HUMAN),5%, 50ML: HCPCS | Performed by: SURGERY

## 2023-11-21 DEVICE — GRFT HEMASHLD/PLAT WOVN 10X28 50CM: Type: IMPLANTABLE DEVICE | Site: HEART | Status: FUNCTIONAL

## 2023-11-21 DEVICE — PLEDGET INCISIONLINE REINF TFE SFT PTFE 1.5X3X7MM WHT: Type: IMPLANTABLE DEVICE | Site: HEART | Status: FUNCTIONAL

## 2023-11-21 DEVICE — WAX,BONE,NATURAL
Type: IMPLANTABLE DEVICE | Site: STERNUM | Status: FUNCTIONAL
Brand: MEDLINE INDUSTRIES

## 2023-11-21 DEVICE — KT HEMOST ABS SURGIFOAM PORCN 1GRAM: Type: IMPLANTABLE DEVICE | Site: CHEST | Status: FUNCTIONAL

## 2023-11-21 DEVICE — GRFT HEMASHLD WOVN STR 30CMX34 PLAT: Type: IMPLANTABLE DEVICE | Site: HEART | Status: FUNCTIONAL

## 2023-11-21 DEVICE — SEAL HEMO SURG ARISTA/AH ABS/PWDR 3GM: Type: IMPLANTABLE DEVICE | Site: HEART | Status: FUNCTIONAL

## 2023-11-21 DEVICE — COR-KNOT® QUICK LOAD® SINGLES
Type: IMPLANTABLE DEVICE | Site: HEART | Status: FUNCTIONAL
Brand: COR-KNOT® QUICK LOAD®

## 2023-11-21 DEVICE — IMPLANTABLE DEVICE: Type: IMPLANTABLE DEVICE | Site: HEART | Status: FUNCTIONAL

## 2023-11-21 DEVICE — COR-KNOT MINI® COMBO KITBASE PACKAGE TYPE - KITEACH STERILE PACKAGE KIT CONTAINS (2) SINGLE PATIENT USE COR-KNOT MINI® DEVICES AND (12) COR-KNOT® QUICK LOADS®.
Type: IMPLANTABLE DEVICE | Site: CHEST | Status: FUNCTIONAL
Brand: COR-KNOT MINI®

## 2023-11-21 DEVICE — VLV AORTA INSPRIS RESILIA 27MM: Type: IMPLANTABLE DEVICE | Site: HEART | Status: FUNCTIONAL

## 2023-11-21 DEVICE — ADHS SURG BIOGLUE PREF 5ML: Type: IMPLANTABLE DEVICE | Site: HEART | Status: FUNCTIONAL

## 2023-11-21 DEVICE — ABSORBABLE HEMOSTAT (OXIDIZED REGENERATED CELLULOSE)
Type: IMPLANTABLE DEVICE | Site: HEART | Status: FUNCTIONAL
Brand: SURGICEL NU-KNIT

## 2023-11-21 DEVICE — WR SUT NONABS MF SS V40 1/2CIR TC 6/0 18IN M649G: Type: IMPLANTABLE DEVICE | Site: STERNUM | Status: FUNCTIONAL

## 2023-11-21 RX ORDER — LIDOCAINE HYDROCHLORIDE 20 MG/ML
INJECTION, SOLUTION EPIDURAL; INFILTRATION; INTRACAUDAL; PERINEURAL AS NEEDED
Status: DISCONTINUED | OUTPATIENT
Start: 2023-11-21 | End: 2023-11-21 | Stop reason: SURG

## 2023-11-21 RX ORDER — POTASSIUM CHLORIDE, DEXTROSE MONOHYDRATE 150; 5 MG/100ML; G/100ML
30 INJECTION, SOLUTION INTRAVENOUS CONTINUOUS
Status: DISCONTINUED | OUTPATIENT
Start: 2023-11-21 | End: 2023-11-24

## 2023-11-21 RX ORDER — SODIUM CHLORIDE 9 MG/ML
40 INJECTION, SOLUTION INTRAVENOUS AS NEEDED
Status: DISCONTINUED | OUTPATIENT
Start: 2023-11-21 | End: 2023-11-21 | Stop reason: HOSPADM

## 2023-11-21 RX ORDER — SODIUM CHLORIDE 0.9 % (FLUSH) 0.9 %
10 SYRINGE (ML) INJECTION AS NEEDED
Status: DISCONTINUED | OUTPATIENT
Start: 2023-11-21 | End: 2023-11-21 | Stop reason: HOSPADM

## 2023-11-21 RX ORDER — MEPERIDINE HYDROCHLORIDE 50 MG/ML
25 INJECTION INTRAMUSCULAR; INTRAVENOUS; SUBCUTANEOUS
Status: DISCONTINUED | OUTPATIENT
Start: 2023-11-21 | End: 2023-11-22

## 2023-11-21 RX ORDER — OXYCODONE HYDROCHLORIDE 5 MG/1
5 TABLET ORAL EVERY 4 HOURS PRN
Status: DISCONTINUED | OUTPATIENT
Start: 2023-11-21 | End: 2023-11-26 | Stop reason: HOSPADM

## 2023-11-21 RX ORDER — SODIUM CHLORIDE 0.9 % (FLUSH) 0.9 %
10 SYRINGE (ML) INJECTION EVERY 12 HOURS SCHEDULED
Status: DISCONTINUED | OUTPATIENT
Start: 2023-11-21 | End: 2023-11-21 | Stop reason: HOSPADM

## 2023-11-21 RX ORDER — DEXMEDETOMIDINE HYDROCHLORIDE 4 UG/ML
.2-1.5 INJECTION, SOLUTION INTRAVENOUS
Status: DISCONTINUED | OUTPATIENT
Start: 2023-11-21 | End: 2023-11-22

## 2023-11-21 RX ORDER — ATORVASTATIN CALCIUM 10 MG/1
20 TABLET, FILM COATED ORAL NIGHTLY
Status: DISCONTINUED | OUTPATIENT
Start: 2023-11-22 | End: 2023-11-26 | Stop reason: HOSPADM

## 2023-11-21 RX ORDER — SODIUM CHLORIDE, SODIUM LACTATE, POTASSIUM CHLORIDE, CALCIUM CHLORIDE 600; 310; 30; 20 MG/100ML; MG/100ML; MG/100ML; MG/100ML
100 INJECTION, SOLUTION INTRAVENOUS CONTINUOUS
Status: DISCONTINUED | OUTPATIENT
Start: 2023-11-21 | End: 2023-11-22

## 2023-11-21 RX ORDER — CALCIUM CHLORIDE 100 MG/ML
INJECTION INTRAVENOUS; INTRAVENTRICULAR AS NEEDED
Status: DISCONTINUED | OUTPATIENT
Start: 2023-11-21 | End: 2023-11-21 | Stop reason: SURG

## 2023-11-21 RX ORDER — SODIUM CHLORIDE 9 MG/ML
30 INJECTION, SOLUTION INTRAVENOUS CONTINUOUS PRN
Status: DISCONTINUED | OUTPATIENT
Start: 2023-11-21 | End: 2023-11-21 | Stop reason: HOSPADM

## 2023-11-21 RX ORDER — METHYLPREDNISOLONE SODIUM SUCCINATE 125 MG/2ML
INJECTION, POWDER, LYOPHILIZED, FOR SOLUTION INTRAMUSCULAR; INTRAVENOUS AS NEEDED
Status: DISCONTINUED | OUTPATIENT
Start: 2023-11-21 | End: 2023-11-21 | Stop reason: SURG

## 2023-11-21 RX ORDER — ACETAMINOPHEN 325 MG/1
650 TABLET ORAL EVERY 4 HOURS PRN
Status: DISCONTINUED | OUTPATIENT
Start: 2023-11-22 | End: 2023-11-26 | Stop reason: HOSPADM

## 2023-11-21 RX ORDER — LATANOPROST 50 UG/ML
1 SOLUTION/ DROPS OPHTHALMIC NIGHTLY
Status: DISCONTINUED | OUTPATIENT
Start: 2023-11-21 | End: 2023-11-26 | Stop reason: HOSPADM

## 2023-11-21 RX ORDER — ACETAMINOPHEN 650 MG/1
650 SUPPOSITORY RECTAL EVERY 4 HOURS PRN
Status: DISCONTINUED | OUTPATIENT
Start: 2023-11-22 | End: 2023-11-26 | Stop reason: HOSPADM

## 2023-11-21 RX ORDER — OXYCODONE HYDROCHLORIDE 5 MG/1
10 TABLET ORAL EVERY 4 HOURS PRN
Status: DISCONTINUED | OUTPATIENT
Start: 2023-11-21 | End: 2023-11-26 | Stop reason: HOSPADM

## 2023-11-21 RX ORDER — NICOTINE POLACRILEX 4 MG
15 LOZENGE BUCCAL
Status: DISCONTINUED | OUTPATIENT
Start: 2023-11-21 | End: 2023-11-26 | Stop reason: HOSPADM

## 2023-11-21 RX ORDER — DEXTROSE MONOHYDRATE 25 G/50ML
10-50 INJECTION, SOLUTION INTRAVENOUS
Status: DISCONTINUED | OUTPATIENT
Start: 2023-11-21 | End: 2023-11-26 | Stop reason: HOSPADM

## 2023-11-21 RX ORDER — SODIUM CHLORIDE 0.9 % (FLUSH) 0.9 %
30 SYRINGE (ML) INJECTION ONCE AS NEEDED
Status: DISCONTINUED | OUTPATIENT
Start: 2023-11-21 | End: 2023-11-21 | Stop reason: HOSPADM

## 2023-11-21 RX ORDER — MAGNESIUM HYDROXIDE 1200 MG/15ML
LIQUID ORAL AS NEEDED
Status: DISCONTINUED | OUTPATIENT
Start: 2023-11-21 | End: 2023-11-21 | Stop reason: HOSPADM

## 2023-11-21 RX ORDER — DEXMEDETOMIDINE HYDROCHLORIDE 4 UG/ML
INJECTION, SOLUTION INTRAVENOUS CONTINUOUS PRN
Status: DISCONTINUED | OUTPATIENT
Start: 2023-11-21 | End: 2023-11-21 | Stop reason: SURG

## 2023-11-21 RX ORDER — NICOTINE POLACRILEX 4 MG
15 LOZENGE BUCCAL
Status: DISCONTINUED | OUTPATIENT
Start: 2023-11-21 | End: 2023-11-21 | Stop reason: HOSPADM

## 2023-11-21 RX ORDER — NITROGLYCERIN 0.4 MG/1
0.4 TABLET SUBLINGUAL
Status: DISCONTINUED | OUTPATIENT
Start: 2023-11-21 | End: 2023-11-26 | Stop reason: HOSPADM

## 2023-11-21 RX ORDER — MIDAZOLAM HYDROCHLORIDE 1 MG/ML
2 INJECTION INTRAMUSCULAR; INTRAVENOUS
Status: DISCONTINUED | OUTPATIENT
Start: 2023-11-21 | End: 2023-11-21 | Stop reason: HOSPADM

## 2023-11-21 RX ORDER — SODIUM CHLORIDE 0.9 % (FLUSH) 0.9 %
3 SYRINGE (ML) INJECTION AS NEEDED
Status: DISCONTINUED | OUTPATIENT
Start: 2023-11-21 | End: 2023-11-21 | Stop reason: HOSPADM

## 2023-11-21 RX ORDER — CHLORHEXIDINE GLUCONATE ORAL RINSE 1.2 MG/ML
15 SOLUTION DENTAL EVERY 12 HOURS SCHEDULED
Status: DISCONTINUED | OUTPATIENT
Start: 2023-11-21 | End: 2023-11-21

## 2023-11-21 RX ORDER — ONDANSETRON 2 MG/ML
4 INJECTION INTRAMUSCULAR; INTRAVENOUS EVERY 6 HOURS PRN
Status: DISCONTINUED | OUTPATIENT
Start: 2023-11-21 | End: 2023-11-26 | Stop reason: HOSPADM

## 2023-11-21 RX ORDER — CHLORHEXIDINE GLUCONATE ORAL RINSE 1.2 MG/ML
15 SOLUTION DENTAL EVERY 12 HOURS
Status: DISCONTINUED | OUTPATIENT
Start: 2023-11-21 | End: 2023-11-24

## 2023-11-21 RX ORDER — VECURONIUM BROMIDE 1 MG/ML
INJECTION, POWDER, LYOPHILIZED, FOR SOLUTION INTRAVENOUS AS NEEDED
Status: DISCONTINUED | OUTPATIENT
Start: 2023-11-21 | End: 2023-11-21 | Stop reason: SURG

## 2023-11-21 RX ORDER — ASPIRIN 81 MG/1
81 TABLET ORAL DAILY
Status: DISCONTINUED | OUTPATIENT
Start: 2023-11-23 | End: 2023-11-26 | Stop reason: HOSPADM

## 2023-11-21 RX ORDER — ALBUMIN, HUMAN INJ 5% 5 %
500 SOLUTION INTRAVENOUS ONCE
Status: COMPLETED | OUTPATIENT
Start: 2023-11-21 | End: 2023-11-21

## 2023-11-21 RX ORDER — MORPHINE SULFATE 2 MG/ML
2 INJECTION, SOLUTION INTRAMUSCULAR; INTRAVENOUS
Status: DISCONTINUED | OUTPATIENT
Start: 2023-11-21 | End: 2023-11-22

## 2023-11-21 RX ORDER — ASPIRIN 81 MG/1
162 TABLET, CHEWABLE ORAL ONCE
Status: COMPLETED | OUTPATIENT
Start: 2023-11-22 | End: 2023-11-22

## 2023-11-21 RX ORDER — PROPOFOL 10 MG/ML
VIAL (ML) INTRAVENOUS AS NEEDED
Status: DISCONTINUED | OUTPATIENT
Start: 2023-11-21 | End: 2023-11-21 | Stop reason: SURG

## 2023-11-21 RX ORDER — CEFAZOLIN SODIUM 1 G/3ML
INJECTION, POWDER, FOR SOLUTION INTRAMUSCULAR; INTRAVENOUS AS NEEDED
Status: DISCONTINUED | OUTPATIENT
Start: 2023-11-21 | End: 2023-11-21 | Stop reason: SURG

## 2023-11-21 RX ORDER — PHENYLEPHRINE HYDROCHLORIDE 10 MG/ML
INJECTION INTRAVENOUS AS NEEDED
Status: DISCONTINUED | OUTPATIENT
Start: 2023-11-21 | End: 2023-11-21 | Stop reason: SURG

## 2023-11-21 RX ORDER — ALBUTEROL SULFATE 2.5 MG/3ML
2.5 SOLUTION RESPIRATORY (INHALATION) EVERY 4 HOURS PRN
Status: ACTIVE | OUTPATIENT
Start: 2023-11-21 | End: 2023-11-22

## 2023-11-21 RX ORDER — SODIUM CHLORIDE 0.9 % (FLUSH) 0.9 %
3 SYRINGE (ML) INJECTION EVERY 12 HOURS SCHEDULED
Status: DISCONTINUED | OUTPATIENT
Start: 2023-11-21 | End: 2023-11-21 | Stop reason: HOSPADM

## 2023-11-21 RX ORDER — ALBUMIN, HUMAN INJ 5% 5 %
SOLUTION INTRAVENOUS CONTINUOUS PRN
Status: DISCONTINUED | OUTPATIENT
Start: 2023-11-21 | End: 2023-11-21 | Stop reason: SURG

## 2023-11-21 RX ORDER — DEXTROSE MONOHYDRATE 25 G/50ML
10-50 INJECTION, SOLUTION INTRAVENOUS
Status: DISCONTINUED | OUTPATIENT
Start: 2023-11-21 | End: 2023-11-21 | Stop reason: HOSPADM

## 2023-11-21 RX ORDER — LIDOCAINE HYDROCHLORIDE 10 MG/ML
0.5 INJECTION, SOLUTION EPIDURAL; INFILTRATION; INTRACAUDAL; PERINEURAL ONCE AS NEEDED
Status: DISCONTINUED | OUTPATIENT
Start: 2023-11-21 | End: 2023-11-21 | Stop reason: HOSPADM

## 2023-11-21 RX ORDER — SODIUM CHLORIDE, SODIUM LACTATE, POTASSIUM CHLORIDE, CALCIUM CHLORIDE 600; 310; 30; 20 MG/100ML; MG/100ML; MG/100ML; MG/100ML
1000 INJECTION, SOLUTION INTRAVENOUS CONTINUOUS
Status: DISCONTINUED | OUTPATIENT
Start: 2023-11-21 | End: 2023-11-22

## 2023-11-21 RX ORDER — IPRATROPIUM BROMIDE AND ALBUTEROL SULFATE 2.5; .5 MG/3ML; MG/3ML
3 SOLUTION RESPIRATORY (INHALATION)
Status: DISCONTINUED | OUTPATIENT
Start: 2023-11-21 | End: 2023-11-22

## 2023-11-21 RX ORDER — BISACODYL 5 MG/1
10 TABLET, DELAYED RELEASE ORAL 2 TIMES DAILY
Status: DISCONTINUED | OUTPATIENT
Start: 2023-11-22 | End: 2023-11-26 | Stop reason: HOSPADM

## 2023-11-21 RX ORDER — MIDAZOLAM HYDROCHLORIDE 1 MG/ML
INJECTION INTRAMUSCULAR; INTRAVENOUS AS NEEDED
Status: DISCONTINUED | OUTPATIENT
Start: 2023-11-21 | End: 2023-11-21 | Stop reason: SURG

## 2023-11-21 RX ORDER — HEPARIN SODIUM 1000 [USP'U]/ML
INJECTION, SOLUTION INTRAVENOUS; SUBCUTANEOUS AS NEEDED
Status: DISCONTINUED | OUTPATIENT
Start: 2023-11-21 | End: 2023-11-21 | Stop reason: SURG

## 2023-11-21 RX ORDER — PROTAMINE SULFATE 10 MG/ML
INJECTION, SOLUTION INTRAVENOUS AS NEEDED
Status: DISCONTINUED | OUTPATIENT
Start: 2023-11-21 | End: 2023-11-21 | Stop reason: SURG

## 2023-11-21 RX ORDER — IBUPROFEN 600 MG/1
1 TABLET ORAL
Status: DISCONTINUED | OUTPATIENT
Start: 2023-11-21 | End: 2023-11-21 | Stop reason: HOSPADM

## 2023-11-21 RX ORDER — VANCOMYCIN/0.9 % SOD CHLORIDE 1.5G/250ML
PLASTIC BAG, INJECTION (ML) INTRAVENOUS AS NEEDED
Status: DISCONTINUED | OUTPATIENT
Start: 2023-11-21 | End: 2023-11-21

## 2023-11-21 RX ORDER — NOREPINEPHRINE BITARTRATE 0.03 MG/ML
.02-.3 INJECTION, SOLUTION INTRAVENOUS CONTINUOUS PRN
Status: DISCONTINUED | OUTPATIENT
Start: 2023-11-21 | End: 2023-11-26 | Stop reason: HOSPADM

## 2023-11-21 RX ORDER — ENOXAPARIN SODIUM 100 MG/ML
40 INJECTION SUBCUTANEOUS DAILY
Status: DISCONTINUED | OUTPATIENT
Start: 2023-11-22 | End: 2023-11-26 | Stop reason: HOSPADM

## 2023-11-21 RX ORDER — SODIUM CHLORIDE 0.9 % (FLUSH) 0.9 %
3-10 SYRINGE (ML) INJECTION AS NEEDED
Status: DISCONTINUED | OUTPATIENT
Start: 2023-11-21 | End: 2023-11-21 | Stop reason: HOSPADM

## 2023-11-21 RX ORDER — POLYETHYLENE GLYCOL 3350 17 G/17G
17 POWDER, FOR SOLUTION ORAL DAILY
Status: DISCONTINUED | OUTPATIENT
Start: 2023-11-22 | End: 2023-11-26 | Stop reason: HOSPADM

## 2023-11-21 RX ORDER — ACETAMINOPHEN 500 MG
1000 TABLET ORAL ONCE
Status: COMPLETED | OUTPATIENT
Start: 2023-11-21 | End: 2023-11-21

## 2023-11-21 RX ORDER — ACETAMINOPHEN 325 MG/1
650 TABLET ORAL EVERY 6 HOURS
Status: DISCONTINUED | OUTPATIENT
Start: 2023-11-22 | End: 2023-11-26 | Stop reason: HOSPADM

## 2023-11-21 RX ORDER — VANCOMYCIN/0.9 % SOD CHLORIDE 1.5G/250ML
PLASTIC BAG, INJECTION (ML) INTRAVENOUS AS NEEDED
Status: DISCONTINUED | OUTPATIENT
Start: 2023-11-21 | End: 2023-11-21 | Stop reason: SURG

## 2023-11-21 RX ORDER — SUFENTANIL CITRATE 50 UG/ML
INJECTION EPIDURAL; INTRAVENOUS AS NEEDED
Status: DISCONTINUED | OUTPATIENT
Start: 2023-11-21 | End: 2023-11-21 | Stop reason: SURG

## 2023-11-21 RX ORDER — ACETAMINOPHEN 10 MG/ML
1000 INJECTION, SOLUTION INTRAVENOUS EVERY 8 HOURS
Status: COMPLETED | OUTPATIENT
Start: 2023-11-21 | End: 2023-11-22

## 2023-11-21 RX ORDER — ROCURONIUM BROMIDE 10 MG/ML
INJECTION, SOLUTION INTRAVENOUS AS NEEDED
Status: DISCONTINUED | OUTPATIENT
Start: 2023-11-21 | End: 2023-11-21 | Stop reason: SURG

## 2023-11-21 RX ORDER — ACETAMINOPHEN 160 MG/5ML
650 SOLUTION ORAL EVERY 4 HOURS PRN
Status: DISCONTINUED | OUTPATIENT
Start: 2023-11-22 | End: 2023-11-26 | Stop reason: HOSPADM

## 2023-11-21 RX ORDER — METOPROLOL TARTRATE 5 MG/5ML
INJECTION INTRAVENOUS AS NEEDED
Status: DISCONTINUED | OUTPATIENT
Start: 2023-11-21 | End: 2023-11-21 | Stop reason: SURG

## 2023-11-21 RX ADMIN — LIDOCAINE HYDROCHLORIDE 100 MG: 20 INJECTION, SOLUTION EPIDURAL; INFILTRATION; INTRACAUDAL; PERINEURAL at 10:27

## 2023-11-21 RX ADMIN — VECURONIUM BROMIDE 10 MG: 1 INJECTION, POWDER, LYOPHILIZED, FOR SOLUTION INTRAVENOUS at 13:52

## 2023-11-21 RX ADMIN — SUFENTANIL CITRATE 20 MCG: 50 INJECTION EPIDURAL; INTRAVENOUS at 10:24

## 2023-11-21 RX ADMIN — SODIUM CHLORIDE 2 UNITS/HR: 9 INJECTION, SOLUTION INTRAVENOUS at 18:12

## 2023-11-21 RX ADMIN — PROPOFOL INJECTABLE EMULSION 80 MG: 10 INJECTION, EMULSION INTRAVENOUS at 10:31

## 2023-11-21 RX ADMIN — CEFAZOLIN 2 G: 330 INJECTION, POWDER, FOR SOLUTION INTRAMUSCULAR; INTRAVENOUS at 15:15

## 2023-11-21 RX ADMIN — POTASSIUM CHLORIDE AND DEXTROSE MONOHYDRATE 30 ML/HR: 150; 5 INJECTION, SOLUTION INTRAVENOUS at 17:03

## 2023-11-21 RX ADMIN — POTASSIUM CHLORIDE AND DEXTROSE MONOHYDRATE 30 ML/HR: 150; 5 INJECTION, SOLUTION INTRAVENOUS at 16:57

## 2023-11-21 RX ADMIN — CEFAZOLIN 2000 MG: 2 INJECTION, POWDER, FOR SOLUTION INTRAMUSCULAR; INTRAVENOUS at 10:43

## 2023-11-21 RX ADMIN — Medication 1500 MG: at 11:25

## 2023-11-21 RX ADMIN — PROTAMINE SULFATE 250 MG: 10 INJECTION, SOLUTION INTRAVENOUS at 15:06

## 2023-11-21 RX ADMIN — SUFENTANIL CITRATE 50 MCG: 50 INJECTION EPIDURAL; INTRAVENOUS at 13:52

## 2023-11-21 RX ADMIN — DEXMEDETOMIDINE HYDROCHLORIDE IN 0.9% SODIUM CHLORIDE 0.5 MCG/KG/HR: 4 INJECTION INTRAVENOUS at 15:42

## 2023-11-21 RX ADMIN — SODIUM CHLORIDE 5 MG/HR: 9 INJECTION, SOLUTION INTRAVENOUS at 16:30

## 2023-11-21 RX ADMIN — PHENYLEPHRINE HYDROCHLORIDE 100 MCG: 10 INJECTION INTRAVENOUS at 11:47

## 2023-11-21 RX ADMIN — SUFENTANIL CITRATE 20 MCG: 50 INJECTION EPIDURAL; INTRAVENOUS at 10:26

## 2023-11-21 RX ADMIN — PHENYLEPHRINE HYDROCHLORIDE 100 MCG: 10 INJECTION INTRAVENOUS at 11:52

## 2023-11-21 RX ADMIN — ALBUMIN (HUMAN) 500 ML: 12.5 INJECTION, SOLUTION INTRAVENOUS at 22:02

## 2023-11-21 RX ADMIN — MIDAZOLAM 1 MG: 1 INJECTION INTRAMUSCULAR; INTRAVENOUS at 10:48

## 2023-11-21 RX ADMIN — CALCIUM CHLORIDE 0.7 G: 100 INJECTION INTRAVENOUS; INTRAVENTRICULAR at 15:50

## 2023-11-21 RX ADMIN — HEPARIN SODIUM 45000 UNITS: 1000 INJECTION, SOLUTION INTRAVENOUS; SUBCUTANEOUS at 11:44

## 2023-11-21 RX ADMIN — MORPHINE SULFATE 2 MG: 2 INJECTION, SOLUTION INTRAMUSCULAR; INTRAVENOUS at 21:15

## 2023-11-21 RX ADMIN — SUFENTANIL CITRATE 20 MCG: 50 INJECTION EPIDURAL; INTRAVENOUS at 10:28

## 2023-11-21 RX ADMIN — PROPOFOL INJECTABLE EMULSION 100 MG: 10 INJECTION, EMULSION INTRAVENOUS at 13:06

## 2023-11-21 RX ADMIN — SUFENTANIL CITRATE 50 MCG: 50 INJECTION EPIDURAL; INTRAVENOUS at 12:09

## 2023-11-21 RX ADMIN — CHLORHEXIDINE GLUCONATE 15 ML: 1.2 SOLUTION ORAL at 17:44

## 2023-11-21 RX ADMIN — ACETAMINOPHEN TAB 500 MG 1000 MG: 500 TAB at 09:25

## 2023-11-21 RX ADMIN — Medication 1 APPLICATION: at 22:02

## 2023-11-21 RX ADMIN — MIDAZOLAM 2 MG: 1 INJECTION INTRAMUSCULAR; INTRAVENOUS at 09:56

## 2023-11-21 RX ADMIN — MIDAZOLAM 5 MG: 1 INJECTION INTRAMUSCULAR; INTRAVENOUS at 12:09

## 2023-11-21 RX ADMIN — VECURONIUM BROMIDE 10 MG: 1 INJECTION, POWDER, LYOPHILIZED, FOR SOLUTION INTRAVENOUS at 11:35

## 2023-11-21 RX ADMIN — VANCOMYCIN HYDROCHLORIDE 1250 MG: 10 INJECTION, POWDER, LYOPHILIZED, FOR SOLUTION INTRAVENOUS at 22:39

## 2023-11-21 RX ADMIN — MIDAZOLAM 5 MG: 1 INJECTION INTRAMUSCULAR; INTRAVENOUS at 13:49

## 2023-11-21 RX ADMIN — ALBUMIN (HUMAN) 500 ML: 12.5 INJECTION, SOLUTION INTRAVENOUS at 19:50

## 2023-11-21 RX ADMIN — AMINOCAPROIC ACID 1 G/HR: 250 INJECTION, SOLUTION INTRAVENOUS at 21:40

## 2023-11-21 RX ADMIN — ALBUMIN HUMAN: 0.05 INJECTION, SOLUTION INTRAVENOUS at 15:52

## 2023-11-21 RX ADMIN — ROCURONIUM BROMIDE 100 MG: 10 INJECTION, SOLUTION INTRAVENOUS at 10:32

## 2023-11-21 RX ADMIN — PROPOFOL INJECTABLE EMULSION 100 MG: 10 INJECTION, EMULSION INTRAVENOUS at 13:09

## 2023-11-21 RX ADMIN — DEXMEDETOMIDINE HYDROCHLORIDE 1 MCG/KG/HR: 4 INJECTION, SOLUTION INTRAVENOUS at 19:18

## 2023-11-21 RX ADMIN — SUFENTANIL CITRATE 50 MCG: 50 INJECTION EPIDURAL; INTRAVENOUS at 15:17

## 2023-11-21 RX ADMIN — SUFENTANIL CITRATE 20 MCG: 50 INJECTION EPIDURAL; INTRAVENOUS at 10:32

## 2023-11-21 RX ADMIN — IPRATROPIUM BROMIDE AND ALBUTEROL SULFATE 3 ML: 2.5; .5 SOLUTION RESPIRATORY (INHALATION) at 19:02

## 2023-11-21 RX ADMIN — PROTAMINE SULFATE 25 MG: 10 INJECTION, SOLUTION INTRAVENOUS at 15:16

## 2023-11-21 RX ADMIN — SUFENTANIL CITRATE 50 MCG: 50 INJECTION EPIDURAL; INTRAVENOUS at 11:31

## 2023-11-21 RX ADMIN — MIDAZOLAM 2 MG: 1 INJECTION INTRAMUSCULAR; INTRAVENOUS at 10:27

## 2023-11-21 RX ADMIN — CALCIUM CHLORIDE 0.3 G: 100 INJECTION INTRAVENOUS; INTRAVENTRICULAR at 15:17

## 2023-11-21 RX ADMIN — METOPROLOL TARTRATE 2.5 MG: 5 INJECTION INTRAVENOUS at 11:00

## 2023-11-21 RX ADMIN — MIDAZOLAM 2 MG: 1 INJECTION INTRAMUSCULAR; INTRAVENOUS at 10:30

## 2023-11-21 RX ADMIN — ONDANSETRON 4 MG: 2 INJECTION INTRAMUSCULAR; INTRAVENOUS at 22:18

## 2023-11-21 RX ADMIN — SUFENTANIL CITRATE 20 MCG: 50 INJECTION EPIDURAL; INTRAVENOUS at 10:30

## 2023-11-21 RX ADMIN — METHYLPREDNISOLONE SODIUM SUCCINATE 500 MG: 125 INJECTION, POWDER, FOR SOLUTION INTRAMUSCULAR; INTRAVENOUS at 12:09

## 2023-11-21 RX ADMIN — Medication 1 APPLICATION: at 09:26

## 2023-11-21 RX ADMIN — SODIUM CHLORIDE, POTASSIUM CHLORIDE, SODIUM LACTATE AND CALCIUM CHLORIDE 100 ML/HR: 600; 310; 30; 20 INJECTION, SOLUTION INTRAVENOUS at 09:57

## 2023-11-21 RX ADMIN — AMINOCAPROIC ACID 5 G: 250 INJECTION, SOLUTION INTRAVENOUS at 10:50

## 2023-11-21 RX ADMIN — SODIUM CHLORIDE, POTASSIUM CHLORIDE, SODIUM LACTATE AND CALCIUM CHLORIDE 1000 ML: 600; 310; 30; 20 INJECTION, SOLUTION INTRAVENOUS at 09:57

## 2023-11-21 RX ADMIN — ACETAMINOPHEN 1000 MG: 10 INJECTION, SOLUTION INTRAVENOUS at 17:44

## 2023-11-21 NOTE — ANESTHESIA PROCEDURE NOTES
Airway  Urgency: elective    Date/Time: 11/21/2023 10:33 AM  End Time:11/21/2023 10:33 AM  Airway not difficult    General Information and Staff    Patient location during procedure: OR  SRNA: Carmen Bah SRNA  Indications and Patient Condition  Indications for airway management: airway protection    Preoxygenated: yes  Mask difficulty assessment: 1 - vent by mask    Final Airway Details  Final airway type: endotracheal airway      Successful airway: ETT  Cuffed: yes   Successful intubation technique: video laryngoscopy  Facilitating devices/methods: intubating stylet  Endotracheal tube insertion site: oral  Blade: [place]  Blade size: 4  ETT size (mm): 8.0  Cormack-Lehane Classification: grade I - full view of glottis  Placement verified by: capnometry   Cuff volume (mL): 6  Measured from: lips  ETT/EBT  to lips (cm): 22  Number of attempts at approach: 1  Assessment: lips, teeth, and gum same as pre-op and atraumatic intubation    Additional Comments  Small chip on front tooth pre-existing prior to procedure, unchanged after intubation

## 2023-11-21 NOTE — ANESTHESIA PROCEDURE NOTES
Central Line      Patient reassessed immediately prior to procedure    Patient location during procedure: OR  Start time: 11/21/2023 10:37 AM  Indications: vascular access, MD/Surgeon request and central pressure monitoring  Staff  Anesthesiologist: Leighann Zhou MD  Preanesthetic Checklist  Completed: patient identified, IV checked, site marked, risks and benefits discussed, surgical consent, monitors and equipment checked, pre-op evaluation and timeout performed  Central Line Prep  Sterile Tech:cap, gloves, gown, mask and sterile barriers  Prep: chloraprep  Patient monitoring: blood pressure monitoring, continuous pulse oximetry and EKG  Central Line Procedure  Laterality:right  Location:internal jugular  Catheter Type:MAC and [place]  Catheter Size:9 Fr  Guidance:ultrasound guided and wire visualized in collapsible vessel prior to dilation  PROCEDURE NOTE/ULTRASOUND INTERPRETATION.  Using ultrasound guidance the potential vascular sites for insertion of the catheter were visualized to determine the patency of the vessel to be used for vascular access.  After selecting the appropriate site for insertion, the needle was visualized under ultrasound being inserted into the internal jugular vein, followed by ultrasound confirmation of wire and catheter placement. There were no abnormalities seen on ultrasound; an image was taken; and the patient tolerated the procedure with no complications.   Assessment  Post procedure:biopatch applied, line sutured and occlusive dressing applied  Assessement:blood return through all ports, chest x-ray ordered and free fluid flow  Complications:no  Patient Tolerance:patient tolerated the procedure well with no apparent complications  Additional Notes  [place] floated and secured at 45 cm

## 2023-11-21 NOTE — INTERVAL H&P NOTE
No significant change.  To OR today for aortic root replacement with bio-Bentall, hemiarch replacement.  Discussed again the operative plan, risks and benefits, he understands and agrees to proceed.    Ramon Kumar M.D.  Cardiothoracic Surgeon

## 2023-11-21 NOTE — ANESTHESIA PROCEDURE NOTES
Arterial Line      Patient reassessed immediately prior to procedure    Patient location during procedure: pre-op  Start time: 11/21/2023 10:00 AM   Line placed for hemodynamic monitoring, ABGs/Labs/ISTAT and MD/Surgeon request.  Performed By   CRNA/CAA: Sarina Woodard CRNA   Preanesthetic Checklist  Completed: patient identified, IV checked, site marked, risks and benefits discussed, surgical consent, monitors and equipment checked, pre-op evaluation and timeout performed  Arterial Line Prep    Sterile Tech: gloves, sterile barriers and cap  Prep: ChloraPrep  Patient monitoring: blood pressure monitoring, continuous pulse oximetry and EKG  Arterial Line Procedure   Laterality:left  Location:  radial artery  Catheter size: 20 G   Guidance: ultrasound guided  PROCEDURE NOTE/ULTRASOUND INTERPRETATION.  Using ultrasound guidance the potential vascular sites for insertion of the catheter were visualized to determine the patency of the vessel to be used for vascular access.  After selecting the appropriate site for insertion, the needle was visualized under ultrasound being inserted into the radial artery, followed by ultrasound confirmation of wire and catheter placement. There were no abnormalities seen on ultrasound; an image was taken; and the patient tolerated the procedure with no complications.   Number of attempts: 1  Successful placement: yes   Post Assessment   Dressing Type: occlusive dressing applied, secured with tape and wrist guard applied.   Complications no  Circ/Move/Sens Assessment: normal and unchanged.   Patient Tolerance: patient tolerated the procedure well with no apparent complications

## 2023-11-21 NOTE — ANESTHESIA PROCEDURE NOTES
Intra-Op Anesthesia BRANDON    Procedure Performed: Intra-Op Anesthesia BRANDON       Start Time:  11/21/2023 10:45 AM       End Time:      Preanesthesia Checklist:  Patient identified, IV assessed, risks and benefits discussed, monitors and equipment assessed, procedure being performed at surgeon's request and anesthesia consent obtained.    General Procedure Information  BRANDON Placed for monitoring purposes only -- This is not a diagnostic BRANDON  Diagnostic Indications for Echo:  hemodynamic monitoring  Physician Requesting Echo: Ramon Kumar MD  Location performed:  OR  Intubated  Bite block not placed  Heart visualized  Probe Insertion:  Easy  Probe Type:  Multiplane  Modalities:  2D only, color flow mapping and continuous wave Doppler        Anesthesia Information  Performed Personally  Anesthesiologist:  Leighann Zhou MD      Echocardiogram Comments:       BRANDON placed at surgeon request.     Please see cardiology diagnostic evaluation for complete report.

## 2023-11-21 NOTE — ANESTHESIA POSTPROCEDURE EVALUATION
Patient: Melecio Magallon    Procedure Summary       Date: 11/21/23 Room / Location:  [facility] OR 16 /  [facility] OR    Anesthesia Start: 1024 Anesthesia Stop: 1629    Procedure: AORTIC ROOT with 27mm INSPIRIS, ASCENDING AORTIC ARCH/HEMIARCH REPLACEMENT WITH CIRCULATORY ARREST, BRANDON (Chest) Diagnosis:       Thoracic aortic aneurysm without rupture, unspecified part      (Thoracic aortic aneurysm without rupture, unspecified part [I71.20])    Surgeons: Ramon Kumar MD Provider: Espinoza España CRNA    Anesthesia Type: Beckie, CVL, PAC, general ASA Status: 4            Anesthesia Type: Beckie, CVL, PAC, general    Vitals  Vitals Value Taken Time   /77 11/21/23 1646   Temp 92.5 °F (33.6 °C) 11/21/23 1640   Pulse 75 11/21/23 1648   Resp 20 11/21/23 1640   SpO2 100 % 11/21/23 1647   Vitals shown include unfiled device data.        Post Anesthesia Care and Evaluation    Patient location during evaluation: ICU  Level of consciousness: obtunded/minimal responses  Pain management: adequate    Airway patency: patent  Anesthetic complications: No anesthetic complications  PONV Status: none  Cardiovascular status: acceptable  Respiratory status: acceptable, ETT and ventilator  Hydration status: acceptable

## 2023-11-21 NOTE — OP NOTE
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 Ascending aorta and aortic root replacement with valved conduit and coronary reconstruction, Bio-Bentall Procedure; CPT 13464  2. Aortic Hemiarch Replacement with beveled open distal anastomosis under arch vessels with circulatory arrest; CPT +98631     Procedure Summary:   Aortic Root Replacement with BioBentall (34mm Dacron graft, 27mm Inspiris valve), Ascending Aortic Replacement with Hemiarch Replacement with 28mm Side Branched Dacron graft     Surgeon:  Ramon Kumar MD  Assistants:  Norma Breaux CFA; Sarina Kennedy [facility]  Anesthesia Staff:  Leighann Zhou MD  Anesthesia Type:  General     Estimated Blood Loss:  Minimal (Cell Saver)     Drains:  1. 24 [place] Cipriano drains x2-anterior and posterior mediastinum     Specimens:  Aorta and Aortic Valve     Operative Indications: Mr. Lora is a 63-year-old male I been following him for an ascending aortic aneurysm and bicuspid aortic valve.  Recently he had a repeat echocardiogram that showed worsening of his aortic stenosis of his bicuspid valve to severe with moderate aortic insufficiency.  His mid ascending aorta measured 5 cm in maximum diameter on most recent echo and CT scan.  With this I discussed with him proceeding with bio Bentall and hemiarch replacement.  We discussed operative expectations as well as risk and benefits at length he understood and agreed to proceed.  He had normal coronary arteries on coronary angiography and a normal LVEF on echo.     Operative Findings:        Enlarged mid ascending aorta and aortic root  Bicuspid aortic valve with fusion of R-L cusps, heavily calcified and minimally mobile leaflets.  Debrided to clean annulus.      Aortic root replacement with a back table  constructed biologic valve conduit, 27 mm Inspiris valve and 34 mm Dacron graft.        Ascending aorta and hemiarch replacement with a beveled distal anastomosis underneath head vessels with circulatory arrest with retrograde cerebral perfusion, 28 mm side branched Dacron graft used      Transesophageal echocardiography showed normal LV and RV function beginning the case with bicuspid aortic valve.  At end of case well-seated aortic valve normal LV and RV function     Total Circulatory Arrest time (with RCP): 14 minutes   Total aortic cross-clamp time: 140 minutes  Total cardiac bypass time: 172 minutes     Operative description in detail:  The patient was taken to the operative suite where he was placed in a supine position.  Induction of general anesthesia and placement of a single-lumen endotracheal tube was performed without remark.  Appropriate arterial and venous access was established without remark.  A right IJ central venous catheter was placed followed by a [place] pulmonary artery catheter by anesthesia staff. The patient was then prepped and draped in the usual and sterile surgical fashion.  A timeout was performed.  Perioperative antibiotics were administered. Beta blocker was given.     Median sternotomy was performed in standard fashion. Hemostasis was ensured along sternal edges.  Midline mediastinal fat and thymus were divided and pericardium opened.  A pericardial well was created.  The distal ascending aorta and right atrial appendage were cannulated for cardiopulmonary bypass standard fashion.  Aortic line was tested and was satisfactory.  The SVC was cannulated for retrograde cerebral perfusion in standard fashion.  A right superior pulmonary vein LV vent was placed in standard fashion.  A combined cardioplegia/aortic root vent set was secured with a horizontal mattress 4-0 Prolene suture.  Retrograde cardioplegia catheter was inserted into the coronary sinus to the free wall the right atrium.   With an appropriate ACT cardiopulmonary bypass was commenced.  Cooling was begun with a goal temperature of 22°C.  During cooling the ascending aorta was dissected out further until it was free from the pulmonary artery and arch vessels were identified.  With that, I proceeded to apply the aortic cross-clamp and administered cardioplegia utilizing standard cardioplegia in an antegrade and retrograde fashion.  With this there was prompt diastolic arrest.  Total standard dose was given.  Cardioplegia was given every 15-20 mins via retrograde, antegrade and direct coronary administration.     I then began dissection of the aortic root.  Stay stitches were placed at the tops of each commissure.  The valve was inspected and was per above findings.  The aortic valve was excised in its entirety.  I then sharply developed left and right coronary buttons and tagged the buttons with pledgeted 4-0 Prolene sutures at the top.  The aortic root was then carefully dissected out down to the level of the annulus.  With this being achieved the patient's temperature was near 22 degrees in appropriate level for circulatory arrest.  An additional dose of cardioplegia was given.     Propofol was bolused until the BIS monitor had a reading of 0.  The head was packed in ice.  Steroids were given.  The cardiopulmonary bypass circuit was stopped aortic cannula removed and the cross-clamp removed.  We began RCP perfusion at 10 mL/min/kg.  The aorta was trimmed up to the previously marked hemiarch position.  The 28 mm branched Dacron graft was beveled and then trimmed with an appropriate bevel for the distal anastomosis.  Distal anastomosis was constructed with a running 3-0 Prolene suture.  After completion of the anastomosis the aortic arch and graft were de-aired.  Cardiopulmonary bypass was resumed at full flow through the sidebranch of the ascending aortic graft.  Hemostasis was ensured along the distal anastomosis.  Anastomosis was  reinforced with bioglue.  After 5 minutes rewarming was begun.     We then returned attention to the aortic root.  The annulus was measured and measured to easily fit a 27 mm valve.  A composite biologic valve graft conduit was created with a 27 mm Inspiris valve and a 34 mm Dacron graft.  The aortic annulus had horizontal mattress 2-0 Tycron sutures placed around its entirety with pledgets on the ventricular side.  The sutures were then passed through the biologic valved graft conduit.  It was seated and secured in place with cor knot suture securement.  The aortic root graft was trimmed.  We then measured the left button and marked its appropriate position on the graft.  Coronary button hole was created with eye cautery, left coronary was anastomosed to the aortic valve conduit with a running 5-0 Prolene suture.  The aortic root was then test pressurized and there was adequate hemostasis at the left coronary button.  We then turned our attention to the right coronary button, with heart full it was measured to appropriate position and marked on the aortic root graft.  Coronary buttonhole was created with eye cautery, right coronary was anastomosed to the aortic valve conduit with a running 5-0 Prolene suture.  The aortic root was test pressurized and there was adequate hemostasis at both buttons and a dose of cardioplegia was given.  Bioglue was used to reinforce the coronary button suture lines.     I then measured the ascending aortic graft and aortic root graft to appropriate length and trimmed appropriately.  A graft to graft anastomosis was created with a running 4-0 Prolene suture.  With that being accomplished aortic root vent was secured in place just above the graft to graft anastomosis.     With that being accomplished, a terminal hotshot was administered.  The patient was placed in trendelenburg position.  Upon completion of terminal hotshot and placement of temporary epicardial pacing wires, with the  aortic vent on high and pump flows diminished, the aortic cross-clamp was released.  With that, full support was implemented.  A nonworking beating phase was implemented.  Ventilation restored.  The retrograde cardioplegia catheter was removed and site oversewn as a matter of routine.       While on cardiopulmonary bypass, vent in the right superior pulmonary vein was removed and there was adequate hemostasis.  With all in readiness, the heart was allowed to fill and then weaned off cardiopulmonary bypass.  He  from bypass well without problem.  We removed the aortic root vent/cardioplegia cannula set once we ensured no intracardiac air on echocardiogram.  Its associated pursestring suture was tied securely. The table was now placed in neutral position.  I decannulated both venous lines and snared down their associated pursestring sutures.  Systemic intravenous protamine was administered.  All associated blood volume was returned to the patient. With continued good hemodynamics, I divided the arterial line by securing suture around the sidebranch of the aortic graft.  At this time I tied down the previously snared venous pursestring suture.  The mediastinum was drained with 24 [place] Cipriano drains placed anteriorly and posteriorly.  I surveyed the chest and hemostasis was pristine.  The sternum was reapproximated with stainless sterile wires placed in an interrupted fashion.  In layers anatomically the soft tissue planes were reapproximated. Instruments, sharps, and sponge counts were reported as correct.      Complications: None     Disposition: Transferred to ICU in stable and guarded condition.     Ramon Kumar M.D.  Cardiothoracic Surgeon

## 2023-11-21 NOTE — PROGRESS NOTES
"Pharmacy Dosing Service  Pharmacokinetics  Vancomycin Initial Evaluation  Assessment/Action/Plan:  Admitted 11/21/2023  8:47 AM   Procedure: 11/21/23 - TX AORTIC HEMIARCH GRAFT W/ISOL & CTRL ARCH VESSELS [14954] (AORTIC ROOT, ASCENDING AORTIC ARCH/HEMIARCH REPLACEMENT WITH CIRCULATORY ARREST)  Surgeon: Ramon Kumar MD      Pre-op dose: 1500 mg  Initiate New Order: Vancomycin 1250 mg IVPB every 12 hours X 3 Doses  Current end date:after 3 doses (48H total coverage)   Levels: deferred for now    Vancomycin dosage initiated based on population pharmacokinetic parameters. Pharmacy will continue to follow daily and adjust dose accordingly.     Subjective:  Melecio Magallon is a 63 y.o. male with a Vancomycin \"Pharmacy to Dose\" consult for the treatment of surgical prophylaxis, for 48 hours of treatment.    AUC Model Data at Dose 4:  D4 1250 mg 48 hours 529 mg/L.hr 17.2 mg/L       Objective:  Ht: 192 cm ; Wt: 93.5 kg (206 lb 2.1 oz)  Estimated Creatinine Clearance: 97.4 mL/min (by C-G formula based on SCr of 1.04 mg/dL).   Creatinine   Date Value Ref Range Status   11/17/2023 1.04 0.76 - 1.27 mg/dL Final      Lab Results   Component Value Date    WBC 7.79 11/21/2023    WBC 6.27 11/17/2023    WBC 5.50 11/07/2023      Baseline culture results:  Microbiology Results (last 10 days)       ** No results found for the last 240 hours. **            Chucho Juarez, PharmD  11/21/23 16:40 CST    "

## 2023-11-22 ENCOUNTER — APPOINTMENT (OUTPATIENT)
Dept: GENERAL RADIOLOGY | Facility: HOSPITAL | Age: 63
DRG: 220 | End: 2023-11-22
Payer: COMMERCIAL

## 2023-11-22 LAB
ANION GAP SERPL CALCULATED.3IONS-SCNC: 13 MMOL/L (ref 5–15)
ANION GAP SERPL CALCULATED.3IONS-SCNC: 6 MMOL/L (ref 5–15)
ARTERIAL PATENCY WRIST A: ABNORMAL
ATMOSPHERIC PRESS: 751 MMHG
BASE EXCESS BLDA CALC-SCNC: -0.3 MMOL/L (ref 0–2)
BASOPHILS # BLD AUTO: 0.01 10*3/MM3 (ref 0–0.2)
BASOPHILS NFR BLD AUTO: 0.1 % (ref 0–1.5)
BDY SITE: ABNORMAL
BH BB BLOOD EXPIRATION DATE: NORMAL
BH BB BLOOD TYPE BARCODE: 5100
BH BB BLOOD TYPE BARCODE: 6200
BH BB BLOOD TYPE BARCODE: 6200
BH BB DISPENSE STATUS: NORMAL
BH BB PRODUCT CODE: NORMAL
BH BB UNIT NUMBER: NORMAL
BODY TEMPERATURE: 37
BUN SERPL-MCNC: 20 MG/DL (ref 8–23)
BUN SERPL-MCNC: 22 MG/DL (ref 8–23)
BUN/CREAT SERPL: 17.5 (ref 7–25)
BUN/CREAT SERPL: 19.1 (ref 7–25)
CALCIUM SPEC-SCNC: 8.7 MG/DL (ref 8.6–10.5)
CALCIUM SPEC-SCNC: 9.2 MG/DL (ref 8.6–10.5)
CHLORIDE SERPL-SCNC: 102 MMOL/L (ref 98–107)
CHLORIDE SERPL-SCNC: 109 MMOL/L (ref 98–107)
CO2 SERPL-SCNC: 21 MMOL/L (ref 22–29)
CO2 SERPL-SCNC: 26 MMOL/L (ref 22–29)
CPAP: 5 CMH2O
CREAT SERPL-MCNC: 1.14 MG/DL (ref 0.76–1.27)
CREAT SERPL-MCNC: 1.15 MG/DL (ref 0.76–1.27)
DEPRECATED RDW RBC AUTO: 41.5 FL (ref 37–54)
DEPRECATED RDW RBC AUTO: 45.5 FL (ref 37–54)
EGFRCR SERPLBLD CKD-EPI 2021: 72 ML/MIN/1.73
EGFRCR SERPLBLD CKD-EPI 2021: 72.7 ML/MIN/1.73
EOSINOPHIL # BLD AUTO: 0 10*3/MM3 (ref 0–0.4)
EOSINOPHIL NFR BLD AUTO: 0 % (ref 0.3–6.2)
ERYTHROCYTE [DISTWIDTH] IN BLOOD BY AUTOMATED COUNT: 13 % (ref 12.3–15.4)
ERYTHROCYTE [DISTWIDTH] IN BLOOD BY AUTOMATED COUNT: 13.6 % (ref 12.3–15.4)
GLUCOSE BLDC GLUCOMTR-MCNC: 162 MG/DL (ref 70–130)
GLUCOSE BLDC GLUCOMTR-MCNC: 164 MG/DL (ref 70–130)
GLUCOSE BLDC GLUCOMTR-MCNC: 169 MG/DL (ref 70–130)
GLUCOSE BLDC GLUCOMTR-MCNC: 171 MG/DL (ref 70–130)
GLUCOSE BLDC GLUCOMTR-MCNC: 172 MG/DL (ref 70–130)
GLUCOSE BLDC GLUCOMTR-MCNC: 182 MG/DL (ref 70–130)
GLUCOSE BLDC GLUCOMTR-MCNC: 183 MG/DL (ref 70–130)
GLUCOSE BLDC GLUCOMTR-MCNC: 185 MG/DL (ref 70–130)
GLUCOSE SERPL-MCNC: 173 MG/DL (ref 65–99)
GLUCOSE SERPL-MCNC: 180 MG/DL (ref 65–99)
HCO3 BLDA-SCNC: 24.5 MMOL/L (ref 20–26)
HCT VFR BLD AUTO: 22.6 % (ref 37.5–51)
HCT VFR BLD AUTO: 26.4 % (ref 37.5–51)
HGB BLD-MCNC: 7.2 G/DL (ref 13–17.7)
HGB BLD-MCNC: 8.4 G/DL (ref 13–17.7)
INHALED O2 CONCENTRATION: 30 %
INR PPP: 1.38 (ref 0.91–1.09)
LYMPHOCYTES # BLD AUTO: 0.42 10*3/MM3 (ref 0.7–3.1)
LYMPHOCYTES NFR BLD AUTO: 3.8 % (ref 19.6–45.3)
Lab: ABNORMAL
MAGNESIUM SERPL-MCNC: 2.1 MG/DL (ref 1.6–2.4)
MCH RBC QN AUTO: 28 PG (ref 26.6–33)
MCH RBC QN AUTO: 28.9 PG (ref 26.6–33)
MCHC RBC AUTO-ENTMCNC: 31.8 G/DL (ref 31.5–35.7)
MCHC RBC AUTO-ENTMCNC: 31.9 G/DL (ref 31.5–35.7)
MCV RBC AUTO: 87.9 FL (ref 79–97)
MCV RBC AUTO: 90.7 FL (ref 79–97)
MODALITY: ABNORMAL
MONOCYTES # BLD AUTO: 0.3 10*3/MM3 (ref 0.1–0.9)
MONOCYTES NFR BLD AUTO: 2.7 % (ref 5–12)
NEUTROPHILS NFR BLD AUTO: 10.26 10*3/MM3 (ref 1.7–7)
NEUTROPHILS NFR BLD AUTO: 93 % (ref 42.7–76)
PCO2 BLDA: 39.9 MM HG (ref 35–45)
PCO2 TEMP ADJ BLD: 39.9 MM HG (ref 35–45)
PH BLDA: 7.4 PH UNITS (ref 7.35–7.45)
PH, TEMP CORRECTED: 7.4 PH UNITS (ref 7.35–7.45)
PLATELET # BLD AUTO: 119 10*3/MM3 (ref 140–450)
PLATELET # BLD AUTO: 131 10*3/MM3 (ref 140–450)
PMV BLD AUTO: 11.5 FL (ref 6–12)
PMV BLD AUTO: 12.3 FL (ref 6–12)
PO2 BLDA: 132 MM HG (ref 83–108)
PO2 TEMP ADJ BLD: 132 MM HG (ref 83–108)
POTASSIUM SERPL-SCNC: 4.6 MMOL/L (ref 3.5–5.2)
POTASSIUM SERPL-SCNC: 4.7 MMOL/L (ref 3.5–5.2)
PROTHROMBIN TIME: 17.1 SECONDS (ref 11.8–14.8)
PSV: 5 CMH2O
QT INTERVAL: 408 MS
QT INTERVAL: 488 MS
QTC INTERVAL: 431 MS
QTC INTERVAL: 470 MS
RBC # BLD AUTO: 2.57 10*6/MM3 (ref 4.14–5.8)
RBC # BLD AUTO: 2.91 10*6/MM3 (ref 4.14–5.8)
SAO2 % BLDCOA: 99.7 % (ref 94–99)
SODIUM SERPL-SCNC: 136 MMOL/L (ref 136–145)
SODIUM SERPL-SCNC: 141 MMOL/L (ref 136–145)
UNIT  ABO: NORMAL
UNIT  RH: NORMAL
VENTILATOR MODE: ABNORMAL
WBC NRBC COR # BLD AUTO: 11.03 10*3/MM3 (ref 3.4–10.8)
WBC NRBC COR # BLD AUTO: 20.29 10*3/MM3 (ref 3.4–10.8)

## 2023-11-22 PROCEDURE — P9016 RBC LEUKOCYTES REDUCED: HCPCS

## 2023-11-22 PROCEDURE — 85027 COMPLETE CBC AUTOMATED: CPT | Performed by: SURGERY

## 2023-11-22 PROCEDURE — 86900 BLOOD TYPING SEROLOGIC ABO: CPT

## 2023-11-22 PROCEDURE — 85610 PROTHROMBIN TIME: CPT | Performed by: SURGERY

## 2023-11-22 PROCEDURE — 25010000002 VANCOMYCIN 10 G RECONSTITUTED SOLUTION: Performed by: SURGERY

## 2023-11-22 PROCEDURE — 94799 UNLISTED PULMONARY SVC/PX: CPT

## 2023-11-22 PROCEDURE — 82948 REAGENT STRIP/BLOOD GLUCOSE: CPT

## 2023-11-22 PROCEDURE — 25010000002 METOCLOPRAMIDE PER 10 MG: Performed by: NURSE PRACTITIONER

## 2023-11-22 PROCEDURE — 71045 X-RAY EXAM CHEST 1 VIEW: CPT

## 2023-11-22 PROCEDURE — 82803 BLOOD GASES ANY COMBINATION: CPT

## 2023-11-22 PROCEDURE — 93010 ELECTROCARDIOGRAM REPORT: CPT | Performed by: INTERNAL MEDICINE

## 2023-11-22 PROCEDURE — 25010000002 ENOXAPARIN PER 10 MG: Performed by: SURGERY

## 2023-11-22 PROCEDURE — 85025 COMPLETE CBC W/AUTO DIFF WBC: CPT | Performed by: SURGERY

## 2023-11-22 PROCEDURE — 97116 GAIT TRAINING THERAPY: CPT

## 2023-11-22 PROCEDURE — 36430 TRANSFUSION BLD/BLD COMPNT: CPT

## 2023-11-22 PROCEDURE — 63710000001 INSULIN LISPRO (HUMAN) PER 5 UNITS: Performed by: SURGERY

## 2023-11-22 PROCEDURE — 94664 DEMO&/EVAL PT USE INHALER: CPT

## 2023-11-22 PROCEDURE — 80048 BASIC METABOLIC PNL TOTAL CA: CPT | Performed by: SURGERY

## 2023-11-22 PROCEDURE — 83735 ASSAY OF MAGNESIUM: CPT | Performed by: SURGERY

## 2023-11-22 PROCEDURE — 25810000003 SODIUM CHLORIDE 0.9 % SOLUTION: Performed by: SURGERY

## 2023-11-22 PROCEDURE — 97162 PT EVAL MOD COMPLEX 30 MIN: CPT

## 2023-11-22 PROCEDURE — 93005 ELECTROCARDIOGRAM TRACING: CPT | Performed by: SURGERY

## 2023-11-22 PROCEDURE — 25010000002 ACETAMINOPHEN 10 MG/ML SOLUTION: Performed by: SURGERY

## 2023-11-22 RX ORDER — INSULIN LISPRO 100 [IU]/ML
2-7 INJECTION, SOLUTION INTRAVENOUS; SUBCUTANEOUS
Status: DISCONTINUED | OUTPATIENT
Start: 2023-11-22 | End: 2023-11-26 | Stop reason: HOSPADM

## 2023-11-22 RX ORDER — METOCLOPRAMIDE HYDROCHLORIDE 5 MG/ML
10 INJECTION INTRAMUSCULAR; INTRAVENOUS EVERY 6 HOURS
Status: COMPLETED | OUTPATIENT
Start: 2023-11-22 | End: 2023-11-23

## 2023-11-22 RX ORDER — LOSARTAN POTASSIUM 25 MG/1
25 TABLET ORAL DAILY
COMMUNITY
End: 2023-11-26 | Stop reason: HOSPADM

## 2023-11-22 RX ORDER — DEXTROSE MONOHYDRATE 25 G/50ML
25 INJECTION, SOLUTION INTRAVENOUS
Status: DISCONTINUED | OUTPATIENT
Start: 2023-11-22 | End: 2023-11-26 | Stop reason: HOSPADM

## 2023-11-22 RX ORDER — PANTOPRAZOLE SODIUM 40 MG/1
40 TABLET, DELAYED RELEASE ORAL
Status: DISCONTINUED | OUTPATIENT
Start: 2023-11-22 | End: 2023-11-26 | Stop reason: HOSPADM

## 2023-11-22 RX ORDER — NICOTINE POLACRILEX 4 MG
15 LOZENGE BUCCAL
Status: DISCONTINUED | OUTPATIENT
Start: 2023-11-22 | End: 2023-11-26 | Stop reason: HOSPADM

## 2023-11-22 RX ADMIN — INSULIN LISPRO 2 UNITS: 100 INJECTION, SOLUTION INTRAVENOUS; SUBCUTANEOUS at 11:32

## 2023-11-22 RX ADMIN — METOCLOPRAMIDE HYDROCHLORIDE 10 MG: 5 INJECTION INTRAMUSCULAR; INTRAVENOUS at 20:45

## 2023-11-22 RX ADMIN — ENOXAPARIN SODIUM 40 MG: 100 INJECTION SUBCUTANEOUS at 08:08

## 2023-11-22 RX ADMIN — Medication 1 APPLICATION: at 09:21

## 2023-11-22 RX ADMIN — OXYCODONE HYDROCHLORIDE 5 MG: 5 TABLET ORAL at 06:44

## 2023-11-22 RX ADMIN — POLYETHYLENE GLYCOL 3350 17 G: 17 POWDER, FOR SOLUTION ORAL at 08:08

## 2023-11-22 RX ADMIN — OXYCODONE HYDROCHLORIDE 5 MG: 5 TABLET ORAL at 23:42

## 2023-11-22 RX ADMIN — OXYCODONE HYDROCHLORIDE 5 MG: 5 TABLET ORAL at 19:50

## 2023-11-22 RX ADMIN — BISACODYL 10 MG: 5 TABLET ORAL at 20:45

## 2023-11-22 RX ADMIN — INSULIN LISPRO 2 UNITS: 100 INJECTION, SOLUTION INTRAVENOUS; SUBCUTANEOUS at 20:45

## 2023-11-22 RX ADMIN — VANCOMYCIN HYDROCHLORIDE 1250 MG: 10 INJECTION, POWDER, LYOPHILIZED, FOR SOLUTION INTRAVENOUS at 11:32

## 2023-11-22 RX ADMIN — CALCIUM CHLORIDE 1000 MG: 100 INJECTION INTRAVENOUS; INTRAVENTRICULAR at 09:21

## 2023-11-22 RX ADMIN — METOPROLOL TARTRATE 12.5 MG: 25 TABLET, FILM COATED ORAL at 08:09

## 2023-11-22 RX ADMIN — METOCLOPRAMIDE HYDROCHLORIDE 10 MG: 5 INJECTION INTRAMUSCULAR; INTRAVENOUS at 14:03

## 2023-11-22 RX ADMIN — BISACODYL 10 MG: 5 TABLET ORAL at 08:08

## 2023-11-22 RX ADMIN — ASPIRIN 162 MG: 81 TABLET, CHEWABLE ORAL at 08:08

## 2023-11-22 RX ADMIN — ACETAMINOPHEN 650 MG: 325 TABLET, FILM COATED ORAL at 16:00

## 2023-11-22 RX ADMIN — INSULIN LISPRO 2 UNITS: 100 INJECTION, SOLUTION INTRAVENOUS; SUBCUTANEOUS at 16:37

## 2023-11-22 RX ADMIN — ATORVASTATIN CALCIUM 20 MG: 10 TABLET, FILM COATED ORAL at 20:45

## 2023-11-22 RX ADMIN — OXYCODONE HYDROCHLORIDE 5 MG: 5 TABLET ORAL at 10:54

## 2023-11-22 RX ADMIN — ACETAMINOPHEN 1000 MG: 10 INJECTION, SOLUTION INTRAVENOUS at 00:38

## 2023-11-22 RX ADMIN — CHLORHEXIDINE GLUCONATE 15 ML: 1.2 SOLUTION ORAL at 18:16

## 2023-11-22 RX ADMIN — METOPROLOL TARTRATE 12.5 MG: 25 TABLET, FILM COATED ORAL at 20:45

## 2023-11-22 RX ADMIN — ACETAMINOPHEN 650 MG: 325 TABLET, FILM COATED ORAL at 09:21

## 2023-11-22 RX ADMIN — VANCOMYCIN HYDROCHLORIDE 1250 MG: 10 INJECTION, POWDER, LYOPHILIZED, FOR SOLUTION INTRAVENOUS at 23:42

## 2023-11-22 RX ADMIN — Medication 1 APPLICATION: at 20:45

## 2023-11-22 RX ADMIN — ACETAMINOPHEN 650 MG: 325 TABLET, FILM COATED ORAL at 20:45

## 2023-11-22 RX ADMIN — CHLORHEXIDINE GLUCONATE 15 ML: 1.2 SOLUTION ORAL at 06:43

## 2023-11-22 RX ADMIN — IPRATROPIUM BROMIDE AND ALBUTEROL SULFATE 3 ML: 2.5; .5 SOLUTION RESPIRATORY (INHALATION) at 06:11

## 2023-11-22 RX ADMIN — METOCLOPRAMIDE HYDROCHLORIDE 10 MG: 5 INJECTION INTRAMUSCULAR; INTRAVENOUS at 08:20

## 2023-11-22 RX ADMIN — PANTOPRAZOLE SODIUM 40 MG: 40 TABLET, DELAYED RELEASE ORAL at 10:54

## 2023-11-22 NOTE — CASE MANAGEMENT/SOCIAL WORK
Discharge Planning Assessment   [place]     Patient Name: Melecio Magallon  MRN: [id number]  Today's Date: 11/22/2023    Admit Date: 11/21/2023        Discharge Needs Assessment       Row Name 11/22/23 1044       Living Environment    People in Home spouse    Name(s) of People in Home Karol    Current Living Arrangements home    Primary Care Provided by self    Provides Primary Care For no one    Family Caregiver if Needed spouse    Family Caregiver Names Karol    Able to Return to Prior Arrangements yes       Resource/Environmental Concerns    Resource/Environmental Concerns none       Transition Planning    Patient/Family Anticipates Transition to home with family    Transportation Anticipated family or friend will provide       Discharge Needs Assessment    Readmission Within the Last 30 Days no previous admission in last 30 days    Equipment Currently Used at Home none    Concerns to be Addressed no discharge needs identified    Equipment Needed After Discharge none    Discharge Coordination/Progress spoke to spouse who patient lives with; has RX coverage and PCP; independent at home prior to illness will follow for DC needs                   Discharge Plan    No documentation.                 Continued Care and Services - Admitted Since 11/21/2023    Coordination has not been started for this encounter.          Demographic Summary    No documentation.                  Functional Status    No documentation.                  Psychosocial    No documentation.                  Abuse/Neglect    No documentation.                  Legal    No documentation.                  Substance Abuse    No documentation.                  Patient Forms    No documentation.                     Carmen Shah RN

## 2023-11-22 NOTE — THERAPY EVALUATION
Patient Name: Melecio Magallon  : 1960    MRN: [id number]                              Today's Date: 2023       Admit Date: 2023    Visit Dx:     ICD-10-CM ICD-9-CM   1. Impaired mobility [Z74.09]  Z74.09 799.89   2. Thoracic aortic aneurysm without rupture, unspecified part  I71.20 441.2     Patient Active Problem List   Diagnosis    Bicuspid aortic valve    Mixed hyperlipidemia    LVH (left ventricular hypertrophy)    Family history of early CAD    Family history of prostate cancer    Kidney stone    Nonrheumatic aortic valve stenosis    Primary hypertension    Bruit of right carotid artery    Thoracic aortic aneurysm without rupture    History of adenomatous polyp of colon     Past Medical History:   Diagnosis Date    Aortic aneurysm     Bicuspid aortic valve     Cancer     skin cancer    Coronary artery disease 2018    Aortic Stenosis / aneurysm    Glaucoma     Kidney stone 2020    LVH (left ventricular hypertrophy) 10/29/2020    Mixed hyperlipidemia 10/29/2020    Nonrheumatic aortic valve insufficiency     Nonrheumatic aortic valve stenosis 2022    Primary hypertension 2022     Past Surgical History:   Procedure Laterality Date    ANKLE SURGERY      placed joint back in place    ASCENDING ARCH/HEMIARCH REPLACEMENT N/A 2023    Procedure: AORTIC ROOT with 27mm INSPIRIS, ASCENDING AORTIC ARCH/HEMIARCH REPLACEMENT WITH CIRCULATORY ARREST, BRANDON;  Surgeon: Ramon Kumar MD;  Location: [facility] OR;  Service: Cardiothoracic;  Laterality: N/A;    CARDIAC CATHETERIZATION N/A 2023    Procedure: Left Heart Cath;  Surgeon: Raul Monteiro MD;  Location: [facility] CATH INVASIVE LOCATION;  Service: Cardiology;  Laterality: N/A;    COLONOSCOPY  2011    normal    COLONOSCOPY N/A 2023    Procedure: COLONOSCOPY WITH ANESTHESIA;  Surgeon: Chemo Williamson MD;  Location: [facility] ENDOSCOPY;  Service: Gastroenterology;  Laterality: N/A;  Pre: History of adenomatous polyp of  colon;  Post: Polyp;  Johny Luis MD    EXTRACORPOREAL SHOCK WAVE LITHOTRIPSY (ESWL) Right 11/30/2022    Procedure: RIGHT EXTRACORPOREAL SHOCKWAVE LITHOTRIPSY;  Surgeon: Venkat Murphy MD;  Location: [facility] OR;  Service: Urology;  Laterality: Right;    LITHOTRIPSY  November 2022      General Information       Row Name 11/22/23 0849          Physical Therapy Time and Intention    Document Type evaluation  s/p 11/21 Aortic root replacement, ascending aortic replacement  -SANTA     Mode of Treatment physical therapy  -SANTA       Row Name 11/22/23 0849          General Information    Patient Profile Reviewed yes  -SANTA     Prior Level of Function independent:;all household mobility;ADL's  -SANTA     Existing Precautions/Restrictions fall;oxygen therapy device and L/min;sternal  chest tube  -SANTA     Barriers to Rehab medically complex  -SANTA       Row Name 11/22/23 0849          Living Environment    People in Home spouse  -SANTA       Row Name 11/22/23 0849          Home Main Entrance    Number of Stairs, Main Entrance three;four  -SANTA       Row Name 11/22/23 0849          Stairs Within Home, Primary    Number of Stairs, Within Home, Primary none  -SANTA       Row Name 11/22/23 0849          Cognition    Orientation Status (Cognition) oriented x 4  -SANTA       Row Name 11/22/23 0849          Safety Issues, Functional Mobility    Impairments Affecting Function (Mobility) balance;endurance/activity tolerance;strength;shortness of breath;pain  -SANTA               User Key  (r) = Recorded By, (t) = Taken By, (c) = Cosigned By      Initials Name Provider Type    Bright Schumacher, PT DPT Physical Therapist                   Mobility       Row Name 11/22/23 0849          Bed Mobility    Comment, (Bed Mobility) in chair  -SANTA       Row Name 11/22/23 0849          Sit-Stand Transfer    Sit-Stand [place] (Transfers) contact guard;minimum assist (75% patient effort)  -SANTA       Row Name 11/22/23 0849          Gait/Stairs (Locomotion)     [place] Level (Gait) contact guard;minimum assist (75% patient effort)  -SANTA     Distance in Feet (Gait) 200 ft  -SANTA     Deviations/Abnormal Patterns (Gait) gait speed decreased  -SANTA     Bilateral Gait Deviations forward flexed posture  -SANTA               User Key  (r) = Recorded By, (t) = Taken By, (c) = Cosigned By      Initials Name Provider Type    Bright Schumacher PT DPT Physical Therapist                   Obj/Interventions       Row Name 11/22/23 0849          Range of Motion Comprehensive    Comment, General Range of Motion B LE AROM WFL  -SANTA       Row Name 11/22/23 0849          Strength Comprehensive (MMT)    Comment, General Manual Muscle Testing (MMT) Assessment B LE grossly 4/5  -SANTA       Row Name 11/22/23 0849          Balance    Balance Assessment sitting dynamic balance;standing dynamic balance  -SANTA     Dynamic Sitting Balance supervision  -SANTA     Position, Sitting Balance unsupported;sitting in chair  -SANTA     Dynamic Standing Balance contact guard;minimal assist  -SANTA     Position/Device Used, Standing Balance supported  -SANTA               User Key  (r) = Recorded By, (t) = Taken By, (c) = Cosigned By      Initials Name Provider Type    Bright Schumacher PT DPT Physical Therapist                   Goals/Plan       Row Name 11/22/23 0849          Bed Mobility Goal 1 (PT)    Activity/Assistive Device (Bed Mobility Goal 1, PT) sit to supine/supine to sit  -SANTA     [place] Level/Cues Needed (Bed Mobility Goal 1, PT) supervision required  -SANTA     Time Frame (Bed Mobility Goal 1, PT) long term goal (LTG);10 days  -SANTA     Progress/Outcomes (Bed Mobility Goal 1, PT) new goal  -SANTA       Row Name 11/22/23 0849          Transfer Goal 1 (PT)    Activity/Assistive Device (Transfer Goal 1, PT) sit-to-stand/stand-to-sit;bed-to-chair/chair-to-bed  -SANTA     [place] Level/Cues Needed (Transfer Goal 1, PT) supervision required  -SANTA     Time Frame (Transfer Goal 1, PT) long term goal (LTG);10 days   -SANTA     Progress/Outcome (Transfer Goal 1, PT) new goal  -SANTA       Row Name 11/22/23 0849          Gait Training Goal 1 (PT)    Activity/Assistive Device (Gait Training Goal 1, PT) gait (walking locomotion);decrease fall risk;improve balance and speed;increase endurance/gait distance  -SANTA     [place] Level (Gait Training Goal 1, PT) supervision required  -SANTA     Distance (Gait Training Goal 1, PT) 250 ft  -SANTA     Time Frame (Gait Training Goal 1, PT) long term goal (LTG);10 days  -SANTA     Progress/Outcome (Gait Training Goal 1, PT) new goal  -SANTA       Row Name 11/22/23 0849          Stairs Goal 1 (PT)    Activity/Assistive Device (Stairs Goal 1, PT) ascending stairs;descending stairs  -SANTA     [place] Level/Cues Needed (Stairs Goal 1, PT) supervision required  -SANTA     Number of Stairs (Stairs Goal 1, PT) 3-5 steps  -SANTA     Time Frame (Stairs Goal 1, PT) long term goal (LTG);10 days  -SANTA     Progress/Outcome (Stairs Goal 1, PT) new goal  -SANTA       Row Name 11/22/23 0849          Therapy Assessment/Plan (PT)    Planned Therapy Interventions (PT) bed mobility training;transfer training;gait training;balance training;home exercise program;patient/family education;postural re-education;stair training;strengthening  -SANTA               User Key  (r) = Recorded By, (t) = Taken By, (c) = Cosigned By      Initials Name Provider Type    Bright Schumacher, PT DPT Physical Therapist                   Clinical Impression       Row Name 11/22/23 0849          Pain    Pain Intervention(s) Repositioned;Ambulation/increased activity  -SANTA     Additional Documentation Pain Scale: FACES Pre/Post-Treatment (Group)  -SANTA       Row Name 11/22/23 0849          Pain Scale: FACES Pre/Post-Treatment    Pain: FACES Scale, Pretreatment 2-->hurts little bit  -SANTA     Pain Location incisional  -SANTA     Pain Location - chest  -SANTA       Row Name 11/22/23 0849          Plan of Care Review    Plan of Care Reviewed With patient  -SANTA     Outcome  Evaluation PT eval complete. He is alert and oriented x 4, spouse and son in room. They were educated on sternal precautions. He needs CGA/min assist x 1 to stand and to ambulate. he ambulates 200 ft around the unit. Demos decreased step length with a flexed posture. Step length and pace did increased with further gait distances. /79 after gait. PT will cont with mobility, balance, and strengthening. Anticipate he will d.c home with spouse.  -SANTA       Row Name 11/22/23 0849          Therapy Assessment/Plan (PT)    Patient/Family Therapy Goals Statement (PT) go home  -SANTA     Rehab Potential (PT) good, to achieve stated therapy goals  -SANTA     Criteria for Skilled Interventions Met (PT) yes;meets criteria;skilled treatment is necessary  -SANTA     Therapy Frequency (PT) 2 times/day  -SANTA     Predicted Duration of Therapy Intervention (PT) unti ld.c  -SANTA       Row Name 11/22/23 0849          Vital Signs    Pre Systolic BP Rehab 84  -SANTA     Pre Treatment Diastolic BP 56  -SANTA     Post Systolic BP Rehab 136  -SANTA     Post Treatment Diastolic BP 79  -SANTA     Pretreatment Heart Rate (beats/min) 74  -SANTA     Posttreatment Heart Rate (beats/min) 70  -SANTA     Pre SpO2 (%) 99  -SANTA     O2 Delivery Pre Treatment nasal cannula  -SANTA     O2 Delivery Intra Treatment nasal cannula  -SANTA     Post SpO2 (%) 99  -SANTA     O2 Delivery Post Treatment nasal cannula  -SANTA     Pre Patient Position Sitting  -SANTA     Intra Patient Position Standing  -SANTA     Post Patient Position Sitting  -SANTA       Row Name 11/22/23 0849          Positioning and Restraints    Pre-Treatment Position sitting in chair/recliner  -SANTA     Post Treatment Position chair  -SANTA     In Chair sitting;call light within reach;encouraged to call for assist;with family/caregiver;with nsg;notified nsg;RUE elevated;LUE elevated;patient within staff view  -SANTA               User Key  (r) = Recorded By, (t) = Taken By, (c) = Cosigned By      Initials Name Provider Type    Bright Schumacher  KAYLI, PT DPT Physical Therapist                   Outcome Measures       Row Name 11/22/23 0849 11/22/23 0715       How much help from another person do you currently need...    Turning from your back to your side while in flat bed without using bedrails? 3  -SANTA 3  -CH    Moving from lying on back to sitting on the side of a flat bed without bedrails? 3  -SANTA 3  -CH    Moving to and from a bed to a chair (including a wheelchair)? 3  -SANTA 3  -CH    Standing up from a chair using your arms (e.g., wheelchair, bedside chair)? 3  -SANTA 3  -CH    Climbing 3-5 steps with a railing? 2  -SANTA 3  -CH    To walk in hospital room? 3  -SANTA 3  -CH    AM-PAC 6 Clicks Score (PT) 17  -SANTA 18  -CH    Highest Level of Mobility Goal 5 --> Static standing  -SANTA 6 --> Walk 10 steps or more  -CH      Row Name 11/22/23 0849          Functional Assessment    Outcome Measure Options AM-PAC 6 Clicks Basic Mobility (PT)  -SANTA               User Key  (r) = Recorded By, (t) = Taken By, (c) = Cosigned By      Initials Name Provider Type    Bright Schumacher, PT DPT Physical Therapist    Gaviota Yusuf, RN Registered Nurse                                 Physical Therapy Education       Title: PT OT SLP Therapies (In Progress)       Topic: Physical Therapy (In Progress)       Point: Mobility training (Done)       Learning Progress Summary             Patient Acceptance, E, VU,DU by SANTA at 11/22/2023 0849    Comment: sternal prec, benefits of activiyt, progression of PT                         Point: Home exercise program (Not Started)       Learner Progress:  Not documented in this visit.              Point: Body mechanics (Not Started)       Learner Progress:  Not documented in this visit.              Point: Precautions (Done)       Learning Progress Summary             Patient Acceptance, E, VU,DU by SANTA at 11/22/2023 0849    Comment: sternal prec, benefits of activiyt, progression of PT                                         User Key        Initials Effective Dates Name Provider Type Discipline    SANTA 02/03/23 -  Bright Sandra PT DPT Physical Therapist PT                  PT Recommendation and Plan  Planned Therapy Interventions (PT): bed mobility training, transfer training, gait training, balance training, home exercise program, patient/family education, postural re-education, stair training, strengthening  Plan of Care Reviewed With: patient  Outcome Evaluation: PT eval complete. He is alert and oriented x 4, spouse and son in room. They were educated on sternal precautions. He needs CGA/min assist x 1 to stand and to ambulate. he ambulates 200 ft around the unit. Demos decreased step length with a flexed posture. Step length and pace did increased with further gait distances. /79 after gait. PT will cont with mobility, balance, and strengthening. Anticipate he will d.c home with spouse.     Time Calculation:         PT Charges       Row Name 11/22/23 0930             Time Calculation    Start Time 0849  -SANTA      Stop Time 0928  -SANTA      Time Calculation (min) 39 min  -SANTA      PT Received On 11/22/23  -SANTA      PT Goal Re-Cert Due Date 12/02/23  -SANTA                User Key  (r) = Recorded By, (t) = Taken By, (c) = Cosigned By      Initials Name Provider Type    Bright Schumacher PT DPT Physical Therapist                  Therapy Charges for Today       Code Description Service Date Service Provider Modifiers Qty    [id number]  PT MELL MOD COMPLEXITY 3 11/22/2023 Bright Sandra PT DPT GP 1            PT G-Codes  Outcome Measure Options: AM-PAC 6 Clicks Basic Mobility (PT)  AM-PAC 6 Clicks Score (PT): 17  PT Discharge Summary  Anticipated Discharge Disposition (PT): home with assist    Bright Sandra, PT DPT  11/22/2023

## 2023-11-22 NOTE — PAYOR COMM NOTE
"Ref:   [id number]     [facility]  JALEEL,  [phone]  OR  FAX   [phone]      Lalo Mendoza \"Rene\" (63 y.o. Male)       Date of Birth   1960    Social Security Number       Address   [address] [address]    Home Phone   [phone]    MRN   [id number]       Jehovah's witness   Confucianism    Marital Status                               Admission Date   11/21/23    Admission Type   Elective    Admitting Provider   Ramon Kumar MD    Attending Provider   Ramon Kumar MD    Department, Room/Bed   [facility] INTENSIVE CARE, I003/1       Discharge Date       Discharge Disposition       Discharge Destination                                 Attending Provider: Ramon Kumar MD    Allergies: No Known Allergies    Isolation: None   Infection: None   Code Status: CPR    Ht: 192 cm (75.59\")   Wt: 96 kg (211 lb 9.6 oz)    Admission Cmt: None   Principal Problem: Thoracic aortic aneurysm without rupture [I71.20]                   Active Insurance as of 11/21/2023       Primary Coverage       Payor Plan Insurance Group Employer/Plan Group    Health Benefits Direct ANTHLive[employer] [id number] HMO [id number]       Payor Plan Address Payor Plan Phone Number Payor Plan Fax Number Effective Dates    [id number] [phone]  12/1/2022 - None Entered    [place] [address]         Subscriber Name Subscriber Birth Date Member ID       LALO MENDOZA 1960 [id number]                     Emergency Contacts        (Rel.) Home Phone Work Phone Mobile Phone    SHEBA MENDOZA (Spouse) -- -- [phone]    NANCY MENDOZA (Son) -- -- [phone]    Sinai Mendoza (Relative) [phone] -- --    BALTA MENDOZA (Son) -- -- [phone]                 History & Physical        Kumar, Ramon TAVAREZ MD at 11/21/23 3739          No significant change.  To OR today for aortic root replacement with bio-Bentall, hemiarch replacement.  Discussed again the operative plan, risks and " "benefits, he understands and agrees to proceed.    Ramon Kumar M.D.  Cardiothoracic Surgeon        Electronically signed by Ramon Kumar MD at 11/21/23 0932   Source Note              [facility] Cardiothoracic Surgery  PROGRESS NOTE   CC: Aortic stenosis and ascending/aortic root aneurysm with bicuspid aortic valve    Subjective:  An adult female accompanies the patient. The patient states that he has been feeling fine. He denies any shortness of breath or trouble doing the things he wants to do. He reports that he has been doing the things he wants to do. He does agree with having surgery to treat this. He states that his cholesterol is borderline high even with medication so that worries him for surgery.    ROS: No fevers, chills or recent illnesses    Objective:      /64 (BP Location: Right arm, Patient Position: Sitting, Cuff Size: Adult)   Pulse 69   Ht 190.5 cm (75\")   Wt 91.2 kg (201 lb)   SpO2 99%   BMI 25.12 kg/m²         PE:  Vitals:    10/23/23 1419   BP: 112/64   Pulse: 69   SpO2: 99%   GENERAL: NAD, resting comfortably, normal color  CARDIOVASCULAR: regular, regular rate, sinus  PULMONARY: Normal bilateral breath sounds, no labored breathing  ABDOMEN: soft, nontender/nondistended  EXTREMITIES: mild peripheral edema, normal pulses, normal ROM      Lab Results (last 72 hours)       ** No results found for the last 72 hours. **        Echocardiogram:  Interpretation Summary      Left ventricular systolic function is normal. Left ventricular ejection fraction appears to be 61 - 65%.    Left ventricular wall thickness is consistent with mild concentric hypertrophy.    Left ventricular diastolic function was normal.    Severe aortic valve stenosis is present.    Estimated right ventricular systolic pressure from tricuspid regurgitation is normal (<35 mmHg).    Mild dilation of the aortic root is present. Moderate dilation of the ascending aorta is present.    Compared to " 9/13/2022 echo aortic stenosis has increased from moderate to severe to now severe    I personally reviewed the echocardiogram; the following is my interpretation:  There is normal LV function with mild LVH, heavily calcified aortic valve that appears to be bicuspid and is consistent with severe aortic stenosis. The ascending aorta on echo does measure 5 cm in maximum dimension, mean gradient across the aortic valve is 40.    Assessment & Plan    Mr. Magallon is a 63-year-old male. He has a bicuspid aortic valve now with severe stenosis and moderate aortic insufficiency. He has normal LV function. He has an ascending aortic aneurysm that measures 5 cm on the most recent echocardiogram, I last had CTA performed in March of last year and at that time measured it to be 4.9 cm. He is asymptomatic but given size criteria 5 cm he wants to proceed with surgery after discussing the pros, cons, risks, and benefits of proceeding.    We will obtain a repeat CTA of the chest and he will need a coronary angiography by Dr. Monteiro, I will talk to him about this. Today we discussed the risk benefits of proceeding with surgery versus continued surveillance as well as preoperative operative and postoperative expectations. We discussed the risk of surgery including but not limited to bleeding, infection, injury to major organs or vessels, chronic heart failure, arrhythmias, need for pacemaker, prolonged ventilation, renal failure, stroke, risk of anesthesia, risk of sternal wound or bone healing problems, reoperation, and/or death.    I did discuss his patient specific risk for aortic root and hemiarch replacement. He understands and wishes to proceed with surgery.    We will complete his work-up and plan on surgery in mid-November. Thank you for trusting me with the care of Mr. Lora. Please do not hesitate to call with questions or concerns.      Ramon Kumar MD   Cardiothoracic Surgeon    Transcribed from ambient dictation for  "Ramon Kumar MD by Gabriela Murphy.  10/23/23   16:44 CDT    Patient or patient representative verbalized consent to the visit recording.  I have personally performed the services described in this document as transcribed by the above individual, and it is both accurate and complete.      Electronically signed by Ramon Kumar MD at 10/25/23 1403                 Ramon Kumar MD at 10/23/23 7450              [facility] Cardiothoracic Surgery  PROGRESS NOTE   CC: Aortic stenosis and ascending/aortic root aneurysm with bicuspid aortic valve    Subjective:  An adult female accompanies the patient. The patient states that he has been feeling fine. He denies any shortness of breath or trouble doing the things he wants to do. He reports that he has been doing the things he wants to do. He does agree with having surgery to treat this. He states that his cholesterol is borderline high even with medication so that worries him for surgery.    ROS: No fevers, chills or recent illnesses    Objective:      /64 (BP Location: Right arm, Patient Position: Sitting, Cuff Size: Adult)   Pulse 69   Ht 190.5 cm (75\")   Wt 91.2 kg (201 lb)   SpO2 99%   BMI 25.12 kg/m²         PE:  Vitals:    10/23/23 1419   BP: 112/64   Pulse: 69   SpO2: 99%     GENERAL: NAD, resting comfortably, normal color  CARDIOVASCULAR: regular, regular rate, sinus  PULMONARY: Normal bilateral breath sounds, no labored breathing  ABDOMEN: soft, nontender/nondistended  EXTREMITIES: mild peripheral edema, normal pulses, normal ROM      Lab Results (last 72 hours)       ** No results found for the last 72 hours. **          Echocardiogram:  Interpretation Summary      Left ventricular systolic function is normal. Left ventricular ejection fraction appears to be 61 - 65%.    Left ventricular wall thickness is consistent with mild concentric hypertrophy.    Left ventricular diastolic function was normal.    Severe aortic valve stenosis " is present.    Estimated right ventricular systolic pressure from tricuspid regurgitation is normal (<35 mmHg).    Mild dilation of the aortic root is present. Moderate dilation of the ascending aorta is present.    Compared to 9/13/2022 echo aortic stenosis has increased from moderate to severe to now severe    I personally reviewed the echocardiogram; the following is my interpretation:  There is normal LV function with mild LVH, heavily calcified aortic valve that appears to be bicuspid and is consistent with severe aortic stenosis. The ascending aorta on echo does measure 5 cm in maximum dimension, mean gradient across the aortic valve is 40.    Assessment & Plan    Mr. Magallon is a 63-year-old male. He has a bicuspid aortic valve now with severe stenosis and moderate aortic insufficiency. He has normal LV function. He has an ascending aortic aneurysm that measures 5 cm on the most recent echocardiogram, I last had CTA performed in March of last year and at that time measured it to be 4.9 cm. He is asymptomatic but given size criteria 5 cm he wants to proceed with surgery after discussing the pros, cons, risks, and benefits of proceeding.    We will obtain a repeat CTA of the chest and he will need a coronary angiography by Dr. Monteiro, I will talk to him about this. Today we discussed the risk benefits of proceeding with surgery versus continued surveillance as well as preoperative operative and postoperative expectations. We discussed the risk of surgery including but not limited to bleeding, infection, injury to major organs or vessels, chronic heart failure, arrhythmias, need for pacemaker, prolonged ventilation, renal failure, stroke, risk of anesthesia, risk of sternal wound or bone healing problems, reoperation, and/or death.    I did discuss his patient specific risk for aortic root and hemiarch replacement. He understands and wishes to proceed with surgery.    We will complete his work-up and plan on  surgery in mid-November. Thank you for trusting me with the care of Mr. Lora. Please do not hesitate to call with questions or concerns.      Ramon Kumar MD   Cardiothoracic Surgeon    Transcribed from ambient dictation for Ramon Kumar MD by Gabriela Murphy.  10/23/23   16:44 CDT    Patient or patient representative verbalized consent to the visit recording.  I have personally performed the services described in this document as transcribed by the above individual, and it is both accurate and complete.      Electronically signed by Ramon Kumar MD at 10/25/23 1403          Operative/Procedure Notes (last 48 hours)        Ramon Kumar MD at 11/21/23 1131          Cardiac Surgery Operative Note     Date of Procedure:  11/21/2023     Preoperative Diagnosis:   Aortic Root Aneurysm and Ascending Aortic Aneurysm  Bicuspid Aortic Valve  Hypertension  Hyperlipidemia     Postoperative Diagnosis:  Same     Procedures Performed:  1. Ascending aorta and aortic root replacement with valved conduit and coronary reconstruction, Bio-Bentall Procedure; CPT 05651  2. Aortic Hemiarch Replacement with beveled open distal anastomosis under arch vessels with circulatory arrest; CPT +49959     Procedure Summary:   Aortic Root Replacement with BioBentall (34mm Dacron graft, 27mm Inspiris valve), Ascending Aortic Replacement with Hemiarch Replacement with 28mm Side Branched Dacron graft     Surgeon:  Ramon Kumar MD  Assistants:  Norma Breaux [facility]; Sarina Kennedy [facility]  Anesthesia Staff:  Leighann Zhou MD  Anesthesia Type:  General     Estimated Blood Loss:  Minimal (Cell Saver)     Drains:  1. 24 [place] Cipriano drains x2-anterior and posterior mediastinum     Specimens:  Aorta and Aortic Valve     Operative Indications: Mr. Lora is a 63-year-old male I been following him for an ascending aortic aneurysm and bicuspid aortic valve.  Recently he had a repeat echocardiogram that showed worsening of his aortic stenosis of his  bicuspid valve to severe with moderate aortic insufficiency.  His mid ascending aorta measured 5 cm in maximum diameter on most recent echo and CT scan.  With this I discussed with him proceeding with bio Bentall and hemiarch replacement.  We discussed operative expectations as well as risk and benefits at length he understood and agreed to proceed.  He had normal coronary arteries on coronary angiography and a normal LVEF on echo.     Operative Findings:        Enlarged mid ascending aorta and aortic root  Bicuspid aortic valve with fusion of R-L cusps, heavily calcified and minimally mobile leaflets.  Debrided to clean annulus.      Aortic root replacement with a back table constructed biologic valve conduit, 27 mm Inspiris valve and 34 mm Dacron graft.        Ascending aorta and hemiarch replacement with a beveled distal anastomosis underneath head vessels with circulatory arrest with retrograde cerebral perfusion, 28 mm side branched Dacron graft used      Transesophageal echocardiography showed normal LV and RV function beginning the case with bicuspid aortic valve.  At end of case well-seated aortic valve normal LV and RV function     Total Circulatory Arrest time (with RCP): 14 minutes   Total aortic cross-clamp time: 140 minutes  Total cardiac bypass time: 172 minutes     Operative description in detail:  The patient was taken to the operative suite where he was placed in a supine position.  Induction of general anesthesia and placement of a single-lumen endotracheal tube was performed without remark.  Appropriate arterial and venous access was established without remark.  A right IJ central venous catheter was placed followed by a [place] pulmonary artery catheter by anesthesia staff. The patient was then prepped and draped in the usual and sterile surgical fashion.  A timeout was performed.  Perioperative antibiotics were administered. Beta blocker was given.     Median sternotomy was performed in standard  fashion. Hemostasis was ensured along sternal edges.  Midline mediastinal fat and thymus were divided and pericardium opened.  A pericardial well was created.  The distal ascending aorta and right atrial appendage were cannulated for cardiopulmonary bypass standard fashion.  Aortic line was tested and was satisfactory.  The SVC was cannulated for retrograde cerebral perfusion in standard fashion.  A right superior pulmonary vein LV vent was placed in standard fashion.  A combined cardioplegia/aortic root vent set was secured with a horizontal mattress 4-0 Prolene suture.  Retrograde cardioplegia catheter was inserted into the coronary sinus to the free wall the right atrium.  With an appropriate ACT cardiopulmonary bypass was commenced.  Cooling was begun with a goal temperature of 22°C.  During cooling the ascending aorta was dissected out further until it was free from the pulmonary artery and arch vessels were identified.  With that, I proceeded to apply the aortic cross-clamp and administered cardioplegia utilizing standard cardioplegia in an antegrade and retrograde fashion.  With this there was prompt diastolic arrest.  Total standard dose was given.  Cardioplegia was given every 15-20 mins via retrograde, antegrade and direct coronary administration.     I then began dissection of the aortic root.  Stay stitches were placed at the tops of each commissure.  The valve was inspected and was per above findings.  The aortic valve was excised in its entirety.  I then sharply developed left and right coronary buttons and tagged the buttons with pledgeted 4-0 Prolene sutures at the top.  The aortic root was then carefully dissected out down to the level of the annulus.  With this being achieved the patient's temperature was near 22 degrees in appropriate level for circulatory arrest.  An additional dose of cardioplegia was given.     Propofol was bolused until the BIS monitor had a reading of 0.  The head was packed  in ice.  Steroids were given.  The cardiopulmonary bypass circuit was stopped aortic cannula removed and the cross-clamp removed.  We began RCP perfusion at 10 mL/min/kg.  The aorta was trimmed up to the previously marked hemiarch position.  The 28 mm branched Dacron graft was beveled and then trimmed with an appropriate bevel for the distal anastomosis.  Distal anastomosis was constructed with a running 3-0 Prolene suture.  After completion of the anastomosis the aortic arch and graft were de-aired.  Cardiopulmonary bypass was resumed at full flow through the sidebranch of the ascending aortic graft.  Hemostasis was ensured along the distal anastomosis.  Anastomosis was reinforced with bioglue.  After 5 minutes rewarming was begun.     We then returned attention to the aortic root.  The annulus was measured and measured to easily fit a 27 mm valve.  A composite biologic valve graft conduit was created with a 27 mm Inspiris valve and a 34 mm Dacron graft.  The aortic annulus had horizontal mattress 2-0 Tycron sutures placed around its entirety with pledgets on the ventricular side.  The sutures were then passed through the biologic valved graft conduit.  It was seated and secured in place with cor knot suture securement.  The aortic root graft was trimmed.  We then measured the left button and marked its appropriate position on the graft.  Coronary button hole was created with eye cautery, left coronary was anastomosed to the aortic valve conduit with a running 5-0 Prolene suture.  The aortic root was then test pressurized and there was adequate hemostasis at the left coronary button.  We then turned our attention to the right coronary button, with heart full it was measured to appropriate position and marked on the aortic root graft.  Coronary buttonhole was created with eye cautery, right coronary was anastomosed to the aortic valve conduit with a running 5-0 Prolene suture.  The aortic root was test pressurized  and there was adequate hemostasis at both buttons and a dose of cardioplegia was given.  Bioglue was used to reinforce the coronary button suture lines.     I then measured the ascending aortic graft and aortic root graft to appropriate length and trimmed appropriately.  A graft to graft anastomosis was created with a running 4-0 Prolene suture.  With that being accomplished aortic root vent was secured in place just above the graft to graft anastomosis.     With that being accomplished, a terminal hotshot was administered.  The patient was placed in trendelenburg position.  Upon completion of terminal hotshot and placement of temporary epicardial pacing wires, with the aortic vent on high and pump flows diminished, the aortic cross-clamp was released.  With that, full support was implemented.  A nonworking beating phase was implemented.  Ventilation restored.  The retrograde cardioplegia catheter was removed and site oversewn as a matter of routine.       While on cardiopulmonary bypass, vent in the right superior pulmonary vein was removed and there was adequate hemostasis.  With all in readiness, the heart was allowed to fill and then weaned off cardiopulmonary bypass.  He  from bypass well without problem.  We removed the aortic root vent/cardioplegia cannula set once we ensured no intracardiac air on echocardiogram.  Its associated pursestring suture was tied securely. The table was now placed in neutral position.  I decannulated both venous lines and snared down their associated pursestring sutures.  Systemic intravenous protamine was administered.  All associated blood volume was returned to the patient. With continued good hemodynamics, I divided the arterial line by securing suture around the sidebranch of the aortic graft.  At this time I tied down the previously snared venous pursestring suture.  The mediastinum was drained with 24 [place] Cipriano drains placed anteriorly and posteriorly.  I  surveyed the chest and hemostasis was pristine.  The sternum was reapproximated with stainless sterile wires placed in an interrupted fashion.  In layers anatomically the soft tissue planes were reapproximated. Instruments, sharps, and sponge counts were reported as correct.      Complications: None     Disposition: Transferred to ICU in stable and guarded condition.     Ramon Kumar M.D.  Cardiothoracic Surgeon    Electronically signed by Ramon Kumar MD at 11/21/23 0807

## 2023-11-22 NOTE — PROGRESS NOTES
Patient name: Melecio Magallon  Patient : 1960  VISIT # [id number]  MR #[id number]    Procedure:Procedure(s):  AORTIC ROOT with 27mm INSPIRIS, ASCENDING AORTIC ARCH/HEMIARCH REPLACEMENT WITH CIRCULATORY ARREST, BRANDON  Procedure Date:2023  POD:1 Day Post-Op    Subjective     Extubated overnight and tolerating 2 L nasal cannula.  Weight is up 5 pounds this morning.  Hemoglobin 7.2 and patient is being transfused 1 unit PRBC.  Pain is well controlled.  On Levophed intermittently.    Telemetry: Sinus rhythm 70s  IV drips: Insulin, IV fluids, Levophed       Objective     Visit Vitals  BP (!) 89/55   Pulse 71   Temp 97.4 °F (36.3 °C) (Bladder)   Resp 18   Wt 96 kg (211 lb 9.6 oz)   SpO2 96%   BMI 26.04 kg/m²   Baseline weight 206 pounds    Intake/Output Summary (Last 24 hours) at 2023 0934  Last data filed at 2023 0915  Gross per 24 hour   Intake 7124.77 ml   Output 4270 ml   Net 2854.77 ml     MCT: 880 mL in 24 hours, serosanguineous, no airleak    Lab:     CBC:  Results from last 7 days   Lab Units 23  0350 23  1631   WBC 10*3/mm3 11.03* 10.21 9.21   HEMATOCRIT % 22.6* 29.3* 27.7*   PLATELETS 10*3/mm3 119* 122* 117*          BMP:  Results from last 7 days   Lab Units 23  03523  1854 23  1646 23  1631   SODIUM mmol/L 141 141  --   --  142   SODIUM, ARTERIAL mmol/L  --   --  143   < >  --    POTASSIUM mmol/L 4.7 4.4  --   --  4.1   CHLORIDE mmol/L 109* 106  --   --  109*   CO2 mmol/L 26.0 27.0  --   --  27.0   GLUCOSE mg/dL 180* 172*  --   --  154*   BUN mg/dL 20 17  --   --  16   CREATININE mg/dL 1.14 1.07  --   --  1.03    < > = values in this interval not displayed.          COAG:  Results from last 7 days   Lab Units 23  0350 23  1945   INR  1.38* 1.29*   APTT seconds  --  31.8       IMAGES:       Imaging Results (Last 24 Hours)       Procedure Component Value Units Date/Time    XR Chest 1 View [[id number]]  Collected: 11/22/23 0645     Updated: 11/22/23 0651    Narrative:      EXAMINATION: XR CHEST 1 VW-     11/22/2023 3:05 AM CST     HISTORY: Post-Op Heart Surgery     A frontal projection of the chest is compared with the previous study  dated 11/21/2023.     There is an area of consolidation/infiltrates/atelectasis in the left  lower lung which was not seen in the previous study.     A persistent and unchanged opacity in the right lower lung may represent  an area of discoid atelectasis.     There is a trace left basal pleural effusion.     There is no pulmonary congestion or pneumothorax.     There is moderate cardiomegaly. There is evidence of prior cardiac  surgery.     A right internal jugular approach Hull-Celeste catheter is in place with  distal end in the proximal lower lobar branch of the right pulmonary  artery. This has changed in the previous study.     The endotracheal tube have been removed since the previous study.     The mediastinal drain is in place. Epicardial pacer leads are in place.     No acute bony abnormality.       Impression:      1. The removal of the endotracheal tube since the previous study.  2. Bilateral lower lung opacities may represent atelectatic changes,  consolidation/evolving infiltrate. Left lower lung opacities were not  present on the previous study.  3. Hull-Celeste catheter in place with distal end in the proximal lower  lobar branch of the right pulmonary artery. It was in the mid right  pulmonary artery in the previous study. The mediastinal drain in place.        This report was signed and finalized on 11/22/2023 6:48 AM CST by Dr. Jere Stanford MD.       XR Chest 1 View [[id number]] Collected: 11/21/23 1722     Updated: 11/21/23 1726    Narrative:      EXAMINATION: XR CHEST 1 VW-  11/21/2023 5:22 PM CST     HISTORY: Postop     FINDINGS: Today's exam is compared to previous study of 4 days earlier.  The patient has undergone median sternotomy with aortic  valve  replacement. A Roselle-Celeste catheter and endotracheal tube are  well-positioned. The lungs are fully expanded and clear with no  pneumothorax or consolidation.       Impression:      1. Endotracheal tube and Roselle-Celeste catheter are well-positioned. The  patient has undergone median sternotomy with aortic valve replacement.     This report was signed and finalized on 11/21/2023 5:23 PM CST by Dr. Pritesh Vásquez MD.             CXR: Bibasilar atelectasis.  No pneumothorax.  Mediastinal chest tubes in stable position.    Physical Exam:  General: Alert, oriented. No apparent distress.  Overweight  Cardiovascular: Regular rate and rhythm without murmur, rubs, or gallops.    Pulmonary: Clear to auscultation bilaterally without wheezing, rubs, or rales.  Chest: Sternotomy incision clean, dry, and intact. Sternum stable. No clicks.  Mediastinal chest tubes to 20 cm suction. No air leak. Fluid is serosanguineous.   Abdomen: Soft, nondistended, and nontender.  Extremities: Warm, moves all extremities. No edema.    Neurologic:  Grossly intact with no focal deficits.            Impression:  Aortic root aneurysm, ascending aortic aneurysm  Bicuspid aortic valve  Hypertension  Hyperlipidemia, on statin        Plan:  Begin bowel regimen, Reglan 10 mg IV every 6 hours x1 day  1 g calcium chloride x1 dose now  Discontinue insulin drip and start sliding scale insulin  Wean supplemental O2 as tolerated  Encourage pulmonary toilet and ambulation  Routine postcardiac surgery care  Keep chest tubes today  Remain in ICU for now.  Discussed with patient, his family, nursing      ANIBAL Miller  11/22/23  09:34 CST

## 2023-11-22 NOTE — PLAN OF CARE
Goal Outcome Evaluation:   Patient stood and ambulated 200' with CGA and no rests. Decreased thomas. Denies pain. Reviewed sternal restrictions and POC. BP 97/59 - 103/64. HR 71 -72. RA SAT 97 -98%. Tolerated activity very well.

## 2023-11-22 NOTE — PLAN OF CARE
Goal Outcome Evaluation:  Plan of Care Reviewed With: patient           Outcome Evaluation: PT eval complete. He is alert and oriented x 4, spouse and son in room. They were educated on sternal precautions. He needs CGA/min assist x 1 to stand and to ambulate. he ambulates 200 ft around the unit. Demos decreased step length with a flexed posture. Step length and pace did increased with further gait distances. /79 after gait. PT will cont with mobility, balance, and strengthening. Anticipate he will d.c home with spouse.      Anticipated Discharge Disposition (PT): home with assist

## 2023-11-23 ENCOUNTER — APPOINTMENT (OUTPATIENT)
Dept: GENERAL RADIOLOGY | Facility: HOSPITAL | Age: 63
DRG: 220 | End: 2023-11-23
Payer: COMMERCIAL

## 2023-11-23 LAB
ANION GAP SERPL CALCULATED.3IONS-SCNC: 7 MMOL/L (ref 5–15)
ANION GAP SERPL CALCULATED.3IONS-SCNC: 7 MMOL/L (ref 5–15)
BH BB BLOOD EXPIRATION DATE: NORMAL
BH BB BLOOD TYPE BARCODE: 6200
BH BB DISPENSE STATUS: NORMAL
BH BB PRODUCT CODE: NORMAL
BH BB UNIT NUMBER: NORMAL
BUN SERPL-MCNC: 28 MG/DL (ref 8–23)
BUN SERPL-MCNC: 29 MG/DL (ref 8–23)
BUN/CREAT SERPL: 19.6 (ref 7–25)
BUN/CREAT SERPL: 22.8 (ref 7–25)
CALCIUM SPEC-SCNC: 8.9 MG/DL (ref 8.6–10.5)
CALCIUM SPEC-SCNC: 9 MG/DL (ref 8.6–10.5)
CHLORIDE SERPL-SCNC: 102 MMOL/L (ref 98–107)
CHLORIDE SERPL-SCNC: 105 MMOL/L (ref 98–107)
CO2 SERPL-SCNC: 25 MMOL/L (ref 22–29)
CO2 SERPL-SCNC: 25 MMOL/L (ref 22–29)
CREAT SERPL-MCNC: 1.27 MG/DL (ref 0.76–1.27)
CREAT SERPL-MCNC: 1.43 MG/DL (ref 0.76–1.27)
DEPRECATED RDW RBC AUTO: 47 FL (ref 37–54)
DEPRECATED RDW RBC AUTO: 47.7 FL (ref 37–54)
EGFRCR SERPLBLD CKD-EPI 2021: 55.4 ML/MIN/1.73
EGFRCR SERPLBLD CKD-EPI 2021: 63.9 ML/MIN/1.73
ERYTHROCYTE [DISTWIDTH] IN BLOOD BY AUTOMATED COUNT: 14 % (ref 12.3–15.4)
ERYTHROCYTE [DISTWIDTH] IN BLOOD BY AUTOMATED COUNT: 14.1 % (ref 12.3–15.4)
GLUCOSE BLDC GLUCOMTR-MCNC: 112 MG/DL (ref 70–130)
GLUCOSE BLDC GLUCOMTR-MCNC: 136 MG/DL (ref 70–130)
GLUCOSE BLDC GLUCOMTR-MCNC: 149 MG/DL (ref 70–130)
GLUCOSE BLDC GLUCOMTR-MCNC: 153 MG/DL (ref 70–130)
GLUCOSE SERPL-MCNC: 147 MG/DL (ref 65–99)
GLUCOSE SERPL-MCNC: 162 MG/DL (ref 65–99)
HCT VFR BLD AUTO: 25.4 % (ref 37.5–51)
HCT VFR BLD AUTO: 26 % (ref 37.5–51)
HGB BLD-MCNC: 7.9 G/DL (ref 13–17.7)
HGB BLD-MCNC: 8 G/DL (ref 13–17.7)
MCH RBC QN AUTO: 28.4 PG (ref 26.6–33)
MCH RBC QN AUTO: 28.5 PG (ref 26.6–33)
MCHC RBC AUTO-ENTMCNC: 30.8 G/DL (ref 31.5–35.7)
MCHC RBC AUTO-ENTMCNC: 31.1 G/DL (ref 31.5–35.7)
MCV RBC AUTO: 91.4 FL (ref 79–97)
MCV RBC AUTO: 92.5 FL (ref 79–97)
PLATELET # BLD AUTO: 111 10*3/MM3 (ref 140–450)
PLATELET # BLD AUTO: 119 10*3/MM3 (ref 140–450)
PMV BLD AUTO: 12.1 FL (ref 6–12)
PMV BLD AUTO: 12.3 FL (ref 6–12)
POTASSIUM SERPL-SCNC: 5.3 MMOL/L (ref 3.5–5.2)
POTASSIUM SERPL-SCNC: 5.3 MMOL/L (ref 3.5–5.2)
QT INTERVAL: 378 MS
QTC INTERVAL: 413 MS
RBC # BLD AUTO: 2.78 10*6/MM3 (ref 4.14–5.8)
RBC # BLD AUTO: 2.81 10*6/MM3 (ref 4.14–5.8)
SODIUM SERPL-SCNC: 134 MMOL/L (ref 136–145)
SODIUM SERPL-SCNC: 137 MMOL/L (ref 136–145)
UNIT  ABO: NORMAL
UNIT  RH: NORMAL
WBC NRBC COR # BLD AUTO: 19.15 10*3/MM3 (ref 3.4–10.8)
WBC NRBC COR # BLD AUTO: 20.84 10*3/MM3 (ref 3.4–10.8)

## 2023-11-23 PROCEDURE — 97116 GAIT TRAINING THERAPY: CPT

## 2023-11-23 PROCEDURE — 85027 COMPLETE CBC AUTOMATED: CPT | Performed by: SURGERY

## 2023-11-23 PROCEDURE — 82948 REAGENT STRIP/BLOOD GLUCOSE: CPT

## 2023-11-23 PROCEDURE — 25010000002 FUROSEMIDE PER 20 MG: Performed by: SURGERY

## 2023-11-23 PROCEDURE — 93005 ELECTROCARDIOGRAM TRACING: CPT | Performed by: SURGERY

## 2023-11-23 PROCEDURE — 25010000002 METOCLOPRAMIDE PER 10 MG: Performed by: NURSE PRACTITIONER

## 2023-11-23 PROCEDURE — 63710000001 INSULIN LISPRO (HUMAN) PER 5 UNITS: Performed by: SURGERY

## 2023-11-23 PROCEDURE — 80048 BASIC METABOLIC PNL TOTAL CA: CPT | Performed by: SURGERY

## 2023-11-23 PROCEDURE — 25010000002 ENOXAPARIN PER 10 MG: Performed by: SURGERY

## 2023-11-23 PROCEDURE — 71045 X-RAY EXAM CHEST 1 VIEW: CPT

## 2023-11-23 PROCEDURE — 93010 ELECTROCARDIOGRAM REPORT: CPT | Performed by: STUDENT IN AN ORGANIZED HEALTH CARE EDUCATION/TRAINING PROGRAM

## 2023-11-23 RX ORDER — FUROSEMIDE 10 MG/ML
20 INJECTION INTRAMUSCULAR; INTRAVENOUS ONCE
Status: COMPLETED | OUTPATIENT
Start: 2023-11-23 | End: 2023-11-23

## 2023-11-23 RX ADMIN — ATORVASTATIN CALCIUM 20 MG: 10 TABLET, FILM COATED ORAL at 20:48

## 2023-11-23 RX ADMIN — ENOXAPARIN SODIUM 40 MG: 100 INJECTION SUBCUTANEOUS at 08:16

## 2023-11-23 RX ADMIN — Medication 1 APPLICATION: at 09:39

## 2023-11-23 RX ADMIN — BISACODYL 10 MG: 5 TABLET ORAL at 20:48

## 2023-11-23 RX ADMIN — ASPIRIN 81 MG: 81 TABLET, COATED ORAL at 08:15

## 2023-11-23 RX ADMIN — CHLORHEXIDINE GLUCONATE 15 ML: 1.2 SOLUTION ORAL at 06:14

## 2023-11-23 RX ADMIN — PANTOPRAZOLE SODIUM 40 MG: 40 TABLET, DELAYED RELEASE ORAL at 05:53

## 2023-11-23 RX ADMIN — OXYCODONE HYDROCHLORIDE 5 MG: 5 TABLET ORAL at 03:53

## 2023-11-23 RX ADMIN — METOPROLOL TARTRATE 12.5 MG: 25 TABLET, FILM COATED ORAL at 20:48

## 2023-11-23 RX ADMIN — ACETAMINOPHEN 650 MG: 325 TABLET, FILM COATED ORAL at 09:39

## 2023-11-23 RX ADMIN — METOCLOPRAMIDE HYDROCHLORIDE 10 MG: 5 INJECTION INTRAMUSCULAR; INTRAVENOUS at 02:37

## 2023-11-23 RX ADMIN — ACETAMINOPHEN 650 MG: 325 TABLET, FILM COATED ORAL at 16:32

## 2023-11-23 RX ADMIN — METOPROLOL TARTRATE 12.5 MG: 25 TABLET, FILM COATED ORAL at 08:15

## 2023-11-23 RX ADMIN — POLYETHYLENE GLYCOL 3350 17 G: 17 POWDER, FOR SOLUTION ORAL at 08:16

## 2023-11-23 RX ADMIN — OXYCODONE HYDROCHLORIDE 5 MG: 5 TABLET ORAL at 08:17

## 2023-11-23 RX ADMIN — ACETAMINOPHEN 650 MG: 325 TABLET, FILM COATED ORAL at 20:48

## 2023-11-23 RX ADMIN — FUROSEMIDE 20 MG: 10 INJECTION, SOLUTION INTRAMUSCULAR; INTRAVENOUS at 09:39

## 2023-11-23 RX ADMIN — ACETAMINOPHEN 650 MG: 325 TABLET, FILM COATED ORAL at 02:37

## 2023-11-23 RX ADMIN — INSULIN LISPRO 2 UNITS: 100 INJECTION, SOLUTION INTRAVENOUS; SUBCUTANEOUS at 08:16

## 2023-11-23 RX ADMIN — BISACODYL 10 MG: 5 TABLET ORAL at 08:19

## 2023-11-23 RX ADMIN — CHLORHEXIDINE GLUCONATE 15 ML: 1.2 SOLUTION ORAL at 16:32

## 2023-11-23 NOTE — PROGRESS NOTES
"Patient name: Melecio Magallon  Patient : 1960  VISIT # [id number]  MR #[id number]    Procedure:Procedure(s):  AORTIC ROOT with 27mm INSPIRIS, ASCENDING AORTIC ARCH/HEMIARCH REPLACEMENT WITH CIRCULATORY ARREST, BRANDON  Procedure Date:2023  POD:2 Days Post-Op    Subjective     Up in recliner.  Reports pain is overall controlled.  Reports experiencing gas but no bowel movement.  Reports pain is overall controlled.  Hemoglobin 8.1 after receiving 1 unit of PRBCs yesterday, WBC 20.84, creatinine 1.27 and potassium 5.3.  Afebrile.  Weight is up 9 pounds, 14 pounds above baseline.  On room air with SPO2 of 96%.  Ambulated 400 feet with physical therapy.  .    Telemetry: Sinus rhythm 70s  IV drips: None       Objective     Visit Vitals  /74   Pulse 72   Temp 98 °F (36.7 °C) (Oral)   Resp 15   Ht 192 cm (75.59\")   Wt 99.8 kg (220 lb)   SpO2 96%   BMI 27.07 kg/m²   Baseline weight 206 pounds    Intake/Output Summary (Last 24 hours) at 2023 1228  Last data filed at 2023 1200  Gross per 24 hour   Intake 970 ml   Output 2170 ml   Net -1200 ml     MCT: 390 mL in 24 hours, serosanguineous, no airleak    Lab:     CBC:  Results from last 7 days   Lab Units 23  0552 23  0005 23  1400   WBC 10*3/mm3 20.84* 19.15* 20.29*   HEMATOCRIT % 26.0* 25.4* 26.4*   PLATELETS 10*3/mm3 111* 119* 131*          BMP:  Results from last 7 days   Lab Units 23  0552 23  0005 23  1400   SODIUM mmol/L 137 134* 136   POTASSIUM mmol/L 5.3* 5.3* 4.6   CHLORIDE mmol/L 105 102 102   CO2 mmol/L 25.0 25.0 21.0*   GLUCOSE mg/dL 162* 147* 173*   BUN mg/dL 29* 28* 22   CREATININE mg/dL 1.27 1.43* 1.15          COAG:  Results from last 7 days   Lab Units 23  0350 23  1945   INR  1.38* 1.29*   APTT seconds  --  31.8       IMAGES:       Imaging Results (Last 24 Hours)       Procedure Component Value Units Date/Time    XR Chest 1 View [[id number]] Collected: 23     Updated: " 11/23/23 0816    Narrative:      EXAM: XR CHEST 1 VW- 11/23/2023 3:00 AM CST     HISTORY: Post-Op Heart Surgery       COMPARISON: 11/22/2023.     TECHNIQUE: Single frontal radiograph of the chest was obtained.     FINDINGS:      Support Devices: Interval removal of the right IJ approach Pace-Celeste  catheter tip with the sheath tip overlying the upper SVC. Status post  aortic valve replacement. Stable mediastinal drains.     Cardiac and Mediastinal Silhouettes: Unchanged     Lungs/Pleura: Mildly increased left basilar pleural-parenchymal  opacities. No visible pneumothorax.     Osseous structures: No acute osseous finding.     Other: None.       Impression:         Interval removal of the Pace-Celeste catheter with sheath tip overlying the  upper SVC.     Mildly increased left basilar pleural-parenchymal opacities.           This report was signed and finalized on 11/23/2023 8:13 AM CST by Ramon Rosenthal.             CXR: No pneumothorax.  Mediastinal chest tubes in stable position.  Bibasilar atelectasis, stable.      Physical Exam:  General: Alert, oriented. No apparent distress.  Overweight  Cardiovascular: Regular rate and rhythm without murmur, rubs, or gallops.    Pulmonary: Clear to auscultation bilaterally without wheezing, rubs, or rales.  Chest: Sternotomy incision clean, dry, and intact. Sternum stable. No clicks.  Mediastinal chest tubes to 20 cm suction. No air leak. Fluid is serosanguineous.   Abdomen: Soft, nondistended, and nontender.  Extremities: Warm, moves all extremities. No edema.    Neurologic:  Grossly intact with no focal deficits.            Impression:  Aortic root aneurysm, ascending aortic aneurysm  Bicuspid aortic valve  Hypertension  Hyperlipidemia, on statin        Plan:  Continue bowel regimen  Discontinue Massey catheter  Encourage pulmonary toilet and ambulation  Routine postcardiac surgery care  Keep chest tubes today  Received Lasix 20 mg IV x1 this morning.    Transfer to  4B    Discussed with patient, his family, nursing      Cande Mane, APRN  11/23/23  12:28 CST

## 2023-11-23 NOTE — THERAPY TREATMENT NOTE
Acute Care - Physical Therapy Treatment Note  [facility]     Patient Name: Melecio Magallon  : 1960  MRN: [id number]  Today's Date: 2023      Visit Dx:     ICD-10-CM ICD-9-CM   1. Impaired mobility [Z74.09]  Z74.09 799.89   2. Thoracic aortic aneurysm without rupture, unspecified part  I71.20 441.2     Patient Active Problem List   Diagnosis    Bicuspid aortic valve    Mixed hyperlipidemia    LVH (left ventricular hypertrophy)    Family history of early CAD    Family history of prostate cancer    Kidney stone    Nonrheumatic aortic valve stenosis    Primary hypertension    Bruit of right carotid artery    Thoracic aortic aneurysm without rupture    History of adenomatous polyp of colon     Past Medical History:   Diagnosis Date    Aortic aneurysm     Bicuspid aortic valve     Cancer     skin cancer    Coronary artery disease 2018    Aortic Stenosis / aneurysm    Glaucoma     Kidney stone 2020    LVH (left ventricular hypertrophy) 10/29/2020    Mixed hyperlipidemia 10/29/2020    Nonrheumatic aortic valve insufficiency     Nonrheumatic aortic valve stenosis 2022    Primary hypertension 2022     Past Surgical History:   Procedure Laterality Date    ANKLE SURGERY      placed joint back in place    ASCENDING ARCH/HEMIARCH REPLACEMENT N/A 2023    Procedure: AORTIC ROOT with 27mm INSPIRIS, ASCENDING AORTIC ARCH/HEMIARCH REPLACEMENT WITH CIRCULATORY ARREST, BRANDON;  Surgeon: Ramon Kumar MD;  Location: [facility] OR;  Service: Cardiothoracic;  Laterality: N/A;    CARDIAC CATHETERIZATION N/A 2023    Procedure: Left Heart Cath;  Surgeon: Raul Monteiro MD;  Location: [facility] CATH INVASIVE LOCATION;  Service: Cardiology;  Laterality: N/A;    COLONOSCOPY  2011    normal    COLONOSCOPY N/A 2023    Procedure: COLONOSCOPY WITH ANESTHESIA;  Surgeon: Chemo Williamson MD;  Location: [facility] ENDOSCOPY;  Service: Gastroenterology;  Laterality: N/A;  Pre: History of adenomatous polyp  of colon;  Post: Polyp;  Johny Luis MD    EXTRACORPOREAL SHOCK WAVE LITHOTRIPSY (ESWL) Right 11/30/2022    Procedure: RIGHT EXTRACORPOREAL SHOCKWAVE LITHOTRIPSY;  Surgeon: Venkat Murphy MD;  Location:  [facility] OR;  Service: Urology;  Laterality: Right;    LITHOTRIPSY  November 2022     PT Assessment (last 12 hours)       PT Evaluation and Treatment       Row Name 11/23/23 1140 11/23/23 0731       Physical Therapy Time and Intention    Subjective Information no complaints  - complains of;pain  -    Document Type therapy note (daily note)  - therapy note (daily note)  -    Mode of Treatment physical therapy  - physical therapy  -      Row Name 11/23/23 1140 11/23/23 0731       General Information    Existing Precautions/Restrictions fall;sternal  chest tube  - fall;sternal  chest tube  -      Row Name 11/23/23 1140 11/23/23 0731       Pain    Pretreatment Pain Rating 3/10  - 3/10  -MF    Posttreatment Pain Rating 3/10  - 3/10  -MF    Pain Location incisional  - incisional  -    Pain Location - chest  - chest  -    Pain Intervention(s) Repositioned  - Repositioned;Ambulation/increased activity  -      Row Name 11/23/23 1140 11/23/23 0731       Bed Mobility    Comment, (Bed Mobility) up in chair  - up in chair  -      Row Name 11/23/23 1140 11/23/23 0731       Sit-Stand Transfer    Sit-Stand [place] (Transfers) standby assist  - standby assist  -      Row Name 11/23/23 1140 11/23/23 0731       Stand-Sit Transfer    Stand-Sit [place] (Transfers) standby assist  - standby assist  -      Row Name 11/23/23 1140 11/23/23 0731       Gait/Stairs (Locomotion)    [place] Level (Gait) contact guard;standby assist  - contact guard;standby assist  -    Distance in Feet (Gait) 400  - 400  -    Comment, (Gait/Stairs) did have LOB x 1-able to self correct.  - --      Row Name 11/23/23 1140 11/23/23 0731       Motor Skills    Comments, Therapeutic Exercise  cardiac warm ups  - cardiac warm ups x 15  -MF      Row Name             Wound 11/21/23 1131 sternal Incision    Wound - Properties Group Placement Date: 11/21/23 -SF Placement Time: 1131  -SF Present on Original Admission: N  -SF Location: sternal  -SF Primary Wound Type: Incision  -SF    Retired Wound - Properties Group Placement Date: 11/21/23 -SF Placement Time: 1131  -SF Present on Original Admission: N  -SF Location: sternal  -SF Primary Wound Type: Incision  -SF    Retired Wound - Properties Group Date first assessed: 11/21/23 -SF Time first assessed: 1131  -SF Present on Original Admission: N  -SF Location: sternal  -SF Primary Wound Type: Incision  -SF      Row Name 11/23/23 0731          Plan of Care Review    Plan of Care Reviewed With patient  -MF     Progress improving  -     Outcome Evaluation Pt. agreeable to therapy and is making great progress. Pt. has minimal c/o pain. He was SBA/CGA for transfers and gait. He walked 400' without rest breaks. His O2 sat remained in the 90's while on RA. Will continue to work on progressive ambulation and independence.  -       Row Name 11/23/23 0731          Vital Signs    Pre SpO2 (%) 97  -MF     O2 Delivery Pre Treatment room air  -MF     O2 Delivery Intra Treatment room air  -MF     Post SpO2 (%) 96  -MF     O2 Delivery Post Treatment room air  -MF     Pre Patient Position Sitting  -     Intra Patient Position Standing  -     Post Patient Position Sitting  -       Row Name 11/23/23 1140 11/23/23 0731       Positioning and Restraints    Pre-Treatment Position sitting in chair/recliner  - sitting in chair/recliner  -    Post Treatment Position chair  - chair  -    In Chair reclined;call light within reach;encouraged to call for assist  - sitting;call light within reach;encouraged to call for assist;notified nsg;with family/caregiver;JESSICA elevated;KELLIE elevated  -              User Key  (r) = Recorded By, (t) = Taken By, (c) = Cosigned By       Initials Name Provider Type    CASSIE StephensonNasreen fischer PTA Physical Therapist Assistant    Fior Wong, RN Registered Nurse                    Physical Therapy Education       Title: PT OT SLP Therapies (In Progress)       Topic: Physical Therapy (In Progress)       Point: Mobility training (Done)       Learning Progress Summary             Patient Acceptance, E, VU,DU by SANTA at 11/22/2023 0849    Comment: sternal prec, benefits of activiyt, progression of PT                         Point: Home exercise program (Not Started)       Learner Progress:  Not documented in this visit.              Point: Body mechanics (Not Started)       Learner Progress:  Not documented in this visit.              Point: Precautions (Done)       Learning Progress Summary             Patient Acceptance, E, VU,DU by SANTA at 11/22/2023 0849    Comment: sternal prec, benefits of activiyt, progression of PT                                         User Key       Initials Effective Dates Name Provider Type Discipline    SANTA 02/03/23 -  Bright Sandra PT DPT Physical Therapist PT                  PT Recommendation and Plan     Plan of Care Reviewed With: patient  Progress: improving  Outcome Evaluation: Pt. agreeable to therapy and is making great progress. Pt. has minimal c/o pain. He was SBA/CGA for transfers and gait. He walked 400' without rest breaks. His O2 sat remained in the 90's while on RA. Will continue to work on progressive ambulation and independence.   Outcome Measures       Row Name 11/23/23 0731             How much help from another person do you currently need...    Turning from your back to your side while in flat bed without using bedrails? 3  -MF      Moving from lying on back to sitting on the side of a flat bed without bedrails? 3  -MF      Moving to and from a bed to a chair (including a wheelchair)? 3  -MF      Standing up from a chair using your arms (e.g., wheelchair, bedside chair)? 3  -MF      Climbing  3-5 steps with a railing? 3  -MF      To walk in hospital room? 3  -MF      AM-PAC 6 Clicks Score (PT) 18  -MF      Highest Level of Mobility Goal 6 --> Walk 10 steps or more  -         Functional Assessment    Outcome Measure Options AM-PAC 6 Clicks Basic Mobility (PT)  -MF                User Key  (r) = Recorded By, (t) = Taken By, (c) = Cosigned By      Initials Name Provider Type    Nasreen Bradley PTA Physical Therapist Assistant                     Time Calculation:    PT Charges       Row Name 11/23/23 1155 11/23/23 0750          Time Calculation    Start Time 1140  -MF 0731  -MF     Stop Time 1155  -MF 0747  -MF     Time Calculation (min) 15 min  -MF 16 min  -MF     PT Received On -- 11/23/23  -MF        Time Calculation- PT    Total Timed Code Minutes- PT 15 minute(s)  -MF 16 minute(s)  -MF        Timed Charges    53287 - Gait Training Minutes  15  -MF 16  -MF        Total Minutes    Timed Charges Total Minutes 15  -MF 16  -MF      Total Minutes 15  -MF 16  -MF               User Key  (r) = Recorded By, (t) = Taken By, (c) = Cosigned By      Initials Name Provider Type    Nasreen Bradley PTA Physical Therapist Assistant                  Therapy Charges for Today       Code Description Service Date Service Provider Modifiers Qty    54194237013 HC GAIT TRAINING EA 15 MIN 11/23/2023 Nasreen Stephenson PTA GP 1    [id number] HC GAIT TRAINING EA 15 MIN 11/23/2023 Nasreen Stephenson, ZOILA GP 1            PT G-Codes  Outcome Measure Options: AM-PAC 6 Clicks Basic Mobility (PT)  AM-PAC 6 Clicks Score (PT): 18    Nasreen Stephenson PTA  11/23/2023

## 2023-11-23 NOTE — THERAPY TREATMENT NOTE
Acute Care - Physical Therapy Treatment Note  [facility]     Patient Name: Melecio Magallon  : 1960  MRN: [id number]  Today's Date: 2023      Visit Dx:     ICD-10-CM ICD-9-CM   1. Impaired mobility [Z74.09]  Z74.09 799.89   2. Thoracic aortic aneurysm without rupture, unspecified part  I71.20 441.2     Patient Active Problem List   Diagnosis    Bicuspid aortic valve    Mixed hyperlipidemia    LVH (left ventricular hypertrophy)    Family history of early CAD    Family history of prostate cancer    Kidney stone    Nonrheumatic aortic valve stenosis    Primary hypertension    Bruit of right carotid artery    Thoracic aortic aneurysm without rupture    History of adenomatous polyp of colon     Past Medical History:   Diagnosis Date    Aortic aneurysm     Bicuspid aortic valve     Cancer     skin cancer    Coronary artery disease 2018    Aortic Stenosis / aneurysm    Glaucoma     Kidney stone 2020    LVH (left ventricular hypertrophy) 10/29/2020    Mixed hyperlipidemia 10/29/2020    Nonrheumatic aortic valve insufficiency     Nonrheumatic aortic valve stenosis 2022    Primary hypertension 2022     Past Surgical History:   Procedure Laterality Date    ANKLE SURGERY      placed joint back in place    ASCENDING ARCH/HEMIARCH REPLACEMENT N/A 2023    Procedure: AORTIC ROOT with 27mm INSPIRIS, ASCENDING AORTIC ARCH/HEMIARCH REPLACEMENT WITH CIRCULATORY ARREST, BRANDON;  Surgeon: Ramon Kumar MD;  Location: [facility] OR;  Service: Cardiothoracic;  Laterality: N/A;    CARDIAC CATHETERIZATION N/A 2023    Procedure: Left Heart Cath;  Surgeon: Raul Monteiro MD;  Location: [facility] CATH INVASIVE LOCATION;  Service: Cardiology;  Laterality: N/A;    COLONOSCOPY  2011    normal    COLONOSCOPY N/A 2023    Procedure: COLONOSCOPY WITH ANESTHESIA;  Surgeon: Chemo Williamson MD;  Location: [facility] ENDOSCOPY;  Service: Gastroenterology;  Laterality: N/A;  Pre: History of adenomatous polyp  of colon;  Post: Polyp;  Johny Luis MD    EXTRACORPOREAL SHOCK WAVE LITHOTRIPSY (ESWL) Right 11/30/2022    Procedure: RIGHT EXTRACORPOREAL SHOCKWAVE LITHOTRIPSY;  Surgeon: Venkat Murphy MD;  Location: [facility] OR;  Service: Urology;  Laterality: Right;    LITHOTRIPSY  November 2022     PT Assessment (last 12 hours)       PT Evaluation and Treatment       Row Name 11/23/23 0731          Physical Therapy Time and Intention    Subjective Information complains of;pain  -     Document Type therapy note (daily note)  -     Mode of Treatment physical therapy  -       Row Name 11/23/23 0731          General Information    Existing Precautions/Restrictions fall;sternal  chest tube  -       Row Name 11/23/23 0731          Pain    Pretreatment Pain Rating 3/10  -     Posttreatment Pain Rating 3/10  -     Pain Location incisional  -     Pain Location - chest  -     Pain Intervention(s) Repositioned;Ambulation/increased activity  -       Row Name 11/23/23 0731          Bed Mobility    Comment, (Bed Mobility) up in chair  -       Row Name 11/23/23 0731          Sit-Stand Transfer    Sit-Stand [place] (Transfers) standby assist  -       Row Name 11/23/23 0731          Stand-Sit Transfer    Stand-Sit [place] (Transfers) standby assist  -       Row Name 11/23/23 0731          Gait/Stairs (Locomotion)    [place] Level (Gait) contact guard;standby assist  -     Distance in Feet (Gait) 400  -       Row Name 11/23/23 0731          Motor Skills    Comments, Therapeutic Exercise cardiac warm ups x 15  -       Row Name             Wound 11/21/23 1131 sternal Incision    Wound - Properties Group Placement Date: 11/21/23 -SF Placement Time: 1131  -SF Present on Original Admission: N  -SF Location: sternal  -SF Primary Wound Type: Incision  -SF    Retired Wound - Properties Group Placement Date: 11/21/23 -SF Placement Time: 1131  -SF Present on Original Admission: N  -SF Location:  sternal  -SF Primary Wound Type: Incision  -SF    Retired Wound - Properties Group Date first assessed: 11/21/23  -SF Time first assessed: 1131  -SF Present on Original Admission: N  -SF Location: sternal  -SF Primary Wound Type: Incision  -SF      Row Name 11/23/23 0731          Plan of Care Review    Plan of Care Reviewed With patient  -MF     Progress improving  -     Outcome Evaluation Pt. agreeable to therapy and is making great progress. Pt. has minimal c/o pain. He was SBA/CGA for transfers and gait. He walked 400' without rest breaks. His O2 sat remained in the 90's while on RA. Will continue to work on progressive ambulation and independence.  -       Row Name 11/23/23 0731          Vital Signs    Pre SpO2 (%) 97  -MF     O2 Delivery Pre Treatment room air  -MF     O2 Delivery Intra Treatment room air  -MF     Post SpO2 (%) 96  -MF     O2 Delivery Post Treatment room air  -MF     Pre Patient Position Sitting  -     Intra Patient Position Standing  -MF     Post Patient Position Sitting  -       Row Name 11/23/23 0731          Positioning and Restraints    Pre-Treatment Position sitting in chair/recliner  -     Post Treatment Position chair  -MF     In Chair sitting;call light within reach;encouraged to call for assist;notified nsg;with family/caregiver;RUE elevated;LUE elevated  -               User Key  (r) = Recorded By, (t) = Taken By, (c) = Cosigned By      Initials Name Provider Type    Nasreen Bradley PTA Physical Therapist Assistant    Fior Wong, RN Registered Nurse                    Physical Therapy Education       Title: PT OT SLP Therapies (In Progress)       Topic: Physical Therapy (In Progress)       Point: Mobility training (Done)       Learning Progress Summary             Patient Acceptance, BRAYAN SKAGGS,ANTONIO by SANTA at 11/22/2023 0849    Comment: sternal prec, benefits of activiyt, progression of PT                         Point: Home exercise program (Not Started)        Learner Progress:  Not documented in this visit.              Point: Body mechanics (Not Started)       Learner Progress:  Not documented in this visit.              Point: Precautions (Done)       Learning Progress Summary             Patient Acceptance, BRAYAN SKAGGSANTONIO by SANTA at 11/22/2023 0849    Comment: sternal prec, benefits of activiyt, progression of PT                                         User Key       Initials Effective Dates Name Provider Type Discipline    SANTA 02/03/23 -  Bright Sandra, PT DPT Physical Therapist PT                  PT Recommendation and Plan     Plan of Care Reviewed With: patient  Progress: improving  Outcome Evaluation: Pt. agreeable to therapy and is making great progress. Pt. has minimal c/o pain. He was SBA/CGA for transfers and gait. He walked 400' without rest breaks. His O2 sat remained in the 90's while on RA. Will continue to work on progressive ambulation and independence.   Outcome Measures       Row Name 11/23/23 0731             How much help from another person do you currently need...    Turning from your back to your side while in flat bed without using bedrails? 3  -MF      Moving from lying on back to sitting on the side of a flat bed without bedrails? 3  -MF      Moving to and from a bed to a chair (including a wheelchair)? 3  -MF      Standing up from a chair using your arms (e.g., wheelchair, bedside chair)? 3  -MF      Climbing 3-5 steps with a railing? 3  -MF      To walk in hospital room? 3  -MF      AM-PAC 6 Clicks Score (PT) 18  -MF      Highest Level of Mobility Goal 6 --> Walk 10 steps or more  -MF         Functional Assessment    Outcome Measure Options AM-PAC 6 Clicks Basic Mobility (PT)  -MF                User Key  (r) = Recorded By, (t) = Taken By, (c) = Cosigned By      Initials Name Provider Type    Nasreen Bradley, PTA Physical Therapist Assistant                     Time Calculation:    PT Charges       Row Name 11/23/23 1250             Time  Calculation    Start Time 0731  -MF      Stop Time 0747  -MF      Time Calculation (min) 16 min  -MF      PT Received On 11/23/23  -MF         Time Calculation- PT    Total Timed Code Minutes- PT 16 minute(s)  -MF         Timed Charges    82854 - Gait Training Minutes  16  -MF         Total Minutes    Timed Charges Total Minutes 16  -MF       Total Minutes 16  -MF                User Key  (r) = Recorded By, (t) = Taken By, (c) = Cosigned By      Initials Name Provider Type     Nasreen Stephenson PTA Physical Therapist Assistant                  Therapy Charges for Today       Code Description Service Date Service Provider Modifiers Qty    07286049453  GAIT TRAINING EA 15 MIN 11/23/2023 Nasreen Stephenson PTA GP 1            PT G-Codes  Outcome Measure Options: AM-PAC 6 Clicks Basic Mobility (PT)  AM-PAC 6 Clicks Score (PT): 18    Nasreen Stephenson PTA  11/23/2023

## 2023-11-23 NOTE — NURSING NOTE
Patient moved out from the unit today to 4b. Patient A/Ox4. VSS/RA. In Sinus rhythm. Continue monitoring.

## 2023-11-23 NOTE — PLAN OF CARE
Goal Outcome Evaluation:  Plan of Care Reviewed With: patient        Progress: improving  Outcome Evaluation: Pt. agreeable to therapy and is making great progress. Pt. has minimal c/o pain. He was SBA/CGA for transfers and gait. He walked 400' without rest breaks. His O2 sat remained in the 90's while on RA. Will continue to work on progressive ambulation and independence.

## 2023-11-24 ENCOUNTER — APPOINTMENT (OUTPATIENT)
Dept: GENERAL RADIOLOGY | Facility: HOSPITAL | Age: 63
DRG: 220 | End: 2023-11-24
Payer: COMMERCIAL

## 2023-11-24 LAB
ANION GAP SERPL CALCULATED.3IONS-SCNC: 5 MMOL/L (ref 5–15)
BUN SERPL-MCNC: 29 MG/DL (ref 8–23)
BUN/CREAT SERPL: 25 (ref 7–25)
CALCIUM SPEC-SCNC: 9.1 MG/DL (ref 8.6–10.5)
CHLORIDE SERPL-SCNC: 104 MMOL/L (ref 98–107)
CO2 SERPL-SCNC: 29 MMOL/L (ref 22–29)
CREAT SERPL-MCNC: 1.16 MG/DL (ref 0.76–1.27)
DEPRECATED RDW RBC AUTO: 48.9 FL (ref 37–54)
EGFRCR SERPLBLD CKD-EPI 2021: 71.2 ML/MIN/1.73
ERYTHROCYTE [DISTWIDTH] IN BLOOD BY AUTOMATED COUNT: 14.5 % (ref 12.3–15.4)
GLUCOSE BLDC GLUCOMTR-MCNC: 131 MG/DL (ref 70–130)
GLUCOSE BLDC GLUCOMTR-MCNC: 144 MG/DL (ref 70–130)
GLUCOSE BLDC GLUCOMTR-MCNC: 166 MG/DL (ref 70–130)
GLUCOSE BLDC GLUCOMTR-MCNC: 99 MG/DL (ref 70–130)
GLUCOSE SERPL-MCNC: 121 MG/DL (ref 65–99)
HCT VFR BLD AUTO: 27.8 % (ref 37.5–51)
HGB BLD-MCNC: 8.4 G/DL (ref 13–17.7)
MCH RBC QN AUTO: 28 PG (ref 26.6–33)
MCHC RBC AUTO-ENTMCNC: 30.2 G/DL (ref 31.5–35.7)
MCV RBC AUTO: 92.7 FL (ref 79–97)
PLATELET # BLD AUTO: 110 10*3/MM3 (ref 140–450)
PMV BLD AUTO: 12.2 FL (ref 6–12)
POTASSIUM SERPL-SCNC: 4.6 MMOL/L (ref 3.5–5.2)
RBC # BLD AUTO: 3 10*6/MM3 (ref 4.14–5.8)
SODIUM SERPL-SCNC: 138 MMOL/L (ref 136–145)
WBC NRBC COR # BLD AUTO: 15.09 10*3/MM3 (ref 3.4–10.8)

## 2023-11-24 PROCEDURE — 82948 REAGENT STRIP/BLOOD GLUCOSE: CPT

## 2023-11-24 PROCEDURE — 93005 ELECTROCARDIOGRAM TRACING: CPT | Performed by: SURGERY

## 2023-11-24 PROCEDURE — 93010 ELECTROCARDIOGRAM REPORT: CPT | Performed by: INTERNAL MEDICINE

## 2023-11-24 PROCEDURE — 97116 GAIT TRAINING THERAPY: CPT

## 2023-11-24 PROCEDURE — 63710000001 INSULIN LISPRO (HUMAN) PER 5 UNITS: Performed by: SURGERY

## 2023-11-24 PROCEDURE — 25010000002 ENOXAPARIN PER 10 MG: Performed by: SURGERY

## 2023-11-24 PROCEDURE — 25010000002 FUROSEMIDE PER 20 MG: Performed by: NURSE PRACTITIONER

## 2023-11-24 PROCEDURE — 25010000002 AMIODARONE IN DEXTROSE 5% 360-4.14 MG/200ML-% SOLUTION: Performed by: SURGERY

## 2023-11-24 PROCEDURE — 80048 BASIC METABOLIC PNL TOTAL CA: CPT | Performed by: SURGERY

## 2023-11-24 PROCEDURE — 71046 X-RAY EXAM CHEST 2 VIEWS: CPT

## 2023-11-24 PROCEDURE — 85027 COMPLETE CBC AUTOMATED: CPT | Performed by: SURGERY

## 2023-11-24 RX ORDER — FUROSEMIDE 10 MG/ML
20 INJECTION INTRAMUSCULAR; INTRAVENOUS EVERY 12 HOURS
Status: DISCONTINUED | OUTPATIENT
Start: 2023-11-24 | End: 2023-11-25

## 2023-11-24 RX ORDER — BISACODYL 10 MG
10 SUPPOSITORY, RECTAL RECTAL DAILY
Status: DISCONTINUED | OUTPATIENT
Start: 2023-11-24 | End: 2023-11-26 | Stop reason: HOSPADM

## 2023-11-24 RX ADMIN — ENOXAPARIN SODIUM 40 MG: 100 INJECTION SUBCUTANEOUS at 10:10

## 2023-11-24 RX ADMIN — BISACODYL 10 MG: 5 TABLET ORAL at 20:39

## 2023-11-24 RX ADMIN — ACETAMINOPHEN 650 MG: 325 TABLET, FILM COATED ORAL at 16:35

## 2023-11-24 RX ADMIN — METOPROLOL TARTRATE 25 MG: 25 TABLET, FILM COATED ORAL at 20:39

## 2023-11-24 RX ADMIN — ACETAMINOPHEN 650 MG: 325 TABLET, FILM COATED ORAL at 20:39

## 2023-11-24 RX ADMIN — FUROSEMIDE 20 MG: 10 INJECTION, SOLUTION INTRAMUSCULAR; INTRAVENOUS at 10:11

## 2023-11-24 RX ADMIN — AMIODARONE HYDROCHLORIDE 0.5 MG/MIN: 1.8 INJECTION, SOLUTION INTRAVENOUS at 17:13

## 2023-11-24 RX ADMIN — ASPIRIN 81 MG: 81 TABLET, COATED ORAL at 10:04

## 2023-11-24 RX ADMIN — BISACODYL 10 MG: 5 TABLET ORAL at 10:04

## 2023-11-24 RX ADMIN — ATORVASTATIN CALCIUM 20 MG: 10 TABLET, FILM COATED ORAL at 20:39

## 2023-11-24 RX ADMIN — AMIODARONE HYDROCHLORIDE 1 MG/MIN: 1.8 INJECTION, SOLUTION INTRAVENOUS at 11:21

## 2023-11-24 RX ADMIN — INSULIN LISPRO 2 UNITS: 100 INJECTION, SOLUTION INTRAVENOUS; SUBCUTANEOUS at 13:09

## 2023-11-24 RX ADMIN — FUROSEMIDE 20 MG: 10 INJECTION, SOLUTION INTRAMUSCULAR; INTRAVENOUS at 22:09

## 2023-11-24 RX ADMIN — ACETAMINOPHEN 650 MG: 325 TABLET, FILM COATED ORAL at 03:32

## 2023-11-24 RX ADMIN — CHLORHEXIDINE GLUCONATE 15 ML: 1.2 SOLUTION ORAL at 05:40

## 2023-11-24 RX ADMIN — PANTOPRAZOLE SODIUM 40 MG: 40 TABLET, DELAYED RELEASE ORAL at 05:40

## 2023-11-24 RX ADMIN — BISACODYL 10 MG: 10 SUPPOSITORY RECTAL at 13:10

## 2023-11-24 RX ADMIN — ACETAMINOPHEN 650 MG: 325 TABLET, FILM COATED ORAL at 10:04

## 2023-11-24 RX ADMIN — POLYETHYLENE GLYCOL 3350 17 G: 17 POWDER, FOR SOLUTION ORAL at 10:04

## 2023-11-24 NOTE — PLAN OF CARE
Goal Outcome Evaluation:  Plan of Care Reviewed With: patient           Outcome Evaluation: no c/o pain this shift. Chest tube CDI, 60 ml out. AF  on tele. Safety maintained.

## 2023-11-24 NOTE — THERAPY DISCHARGE NOTE
Acute Care - Physical Therapy Discharge Summary  [facility]       Patient Name: Melecio Magallon  : 1960  MRN: [id number]    Today's Date: 2023                 Admit Date: 2023      PT Recommendation and Plan    Visit Dx:    ICD-10-CM ICD-9-CM   1. Impaired mobility [Z74.09]  Z74.09 799.89   2. Thoracic aortic aneurysm without rupture, unspecified part  I71.20 441.2        Outcome Measures       Row Name 23 1100 23 0731          How much help from another person do you currently need...    Turning from your back to your side while in flat bed without using bedrails? 4  -GO 3  -MF     Moving from lying on back to sitting on the side of a flat bed without bedrails? 4  -GO 3  -MF     Moving to and from a bed to a chair (including a wheelchair)? 4  -GO 3  -MF     Standing up from a chair using your arms (e.g., wheelchair, bedside chair)? 4  -GO 3  -MF     Climbing 3-5 steps with a railing? 4  -GO 3  -MF     To walk in hospital room? 4  -GO 3  -MF     AM-PAC 6 Clicks Score (PT) 24  -GO 18  -MF     Highest Level of Mobility Goal 8 --> Walked 250 feet or more  -GO 6 --> Walk 10 steps or more  -        Functional Assessment    Outcome Measure Options -- AM-PAC 6 Clicks Basic Mobility (PT)  -               User Key  (r) = Recorded By, (t) = Taken By, (c) = Cosigned By      Initials Name Provider Type    Marie Michel, ZOILA Physical Therapist Assistant     Nasreen Stephenson PTA Physical Therapist Assistant                     PT Charges       Row Name 23 1202 23 1201          Time Calculation    Start Time 1105  -GO 1105  -GO     Stop Time 1128  -GO 1127  -GO     Time Calculation (min) 23 min  -GO 22 min  -GO        Timed Charges    80864 - Gait Training Minutes  23  -GO 2  -GO        Total Minutes    Timed Charges Total Minutes 23  -GO 2  -GO      Total Minutes 23  -GO 2  -GO               User Key  (r) = Recorded By, (t) = Taken By, (c) = Cosigned By      Initials  Name Provider Type    Marie Michel PTA Physical Therapist Assistant                     PT Rehab Goals       Row Name 11/24/23 1200             Bed Mobility Goal 1 (PT)    [place] Level/Cues Needed (Bed Mobility Goal 1, PT) independent  Goal: supervision  -GO      Progress/Outcomes (Bed Mobility Goal 1, PT) goal met  -GO         Transfer Goal 1 (PT)    [place] Level/Cues Needed (Transfer Goal 1, PT) independent  -GO      Progress/Outcome (Transfer Goal 1, PT) goal met  -GO         Gait Training Goal 1 (PT)    [place] Level (Gait Training Goal 1, PT) independent  -GO      Distance (Gait Training Goal 1, PT) 400  Goal: 250  -GO      Progress/Outcome (Gait Training Goal 1, PT) goal met  -GO         Stairs Goal 1 (PT)    [place] Level/Cues Needed (Stairs Goal 1, PT) independent  Goal: supervision  -GO      Number of Stairs (Stairs Goal 1, PT) 3  3 per pt.  -GO      Progress/Outcome (Stairs Goal 1, PT) goal met  -GO                User Key  (r) = Recorded By, (t) = Taken By, (c) = Cosigned By      Initials Name Provider Type Discipline    Marie Michel PTA Physical Therapist Assistant PT                    Therapy Charges for Today       Code Description Service Date Service Provider Modifiers Qty    72309084875 HC GAIT TRAINING EA 15 MIN 11/24/2023 Marie Yusuf PTA GP 2            PT Discharge Summary  Anticipated Discharge Disposition (PT): home with assist  Reason for Discharge: All goals achieved  Outcomes Achieved: Able to achieve all goals within established timeline  Discharge Destination: Home with assist      Marie Yusuf PTA   11/24/2023

## 2023-11-24 NOTE — CASE MANAGEMENT/SOCIAL WORK
Continued Stay Note   [facility]     Patient Name: Melecio Magallon  MRN: [id number]  Today's Date: 11/24/2023    Admit Date: 11/21/2023    Plan: Home   Discharge Plan       Row Name 11/24/23 0845       Plan    Plan Home    Patient/Family in Agreement with Plan yes    Final Discharge Disposition Code 01 - home or self-care    Final Note Chart reviewed; events noted.  Pt plans home with spouse and denies any dc needs.  Pt walking 400 ft with therapy; chest tube still in place.  SW will continue to follow.                   Discharge Codes    No documentation.                       GRACE Martínez

## 2023-11-24 NOTE — PROGRESS NOTES
"Patient name: Melecio Magallon  Patient : 1960  VISIT # [id number]  MR #[id number]    Procedure:Procedure(s):  AORTIC ROOT with 27mm INSPIRIS, ASCENDING AORTIC ARCH/HEMIARCH REPLACEMENT WITH CIRCULATORY ARREST, BRANDON  Procedure Date:2023  POD:3 Days Post-Op    Subjective       Resting in chair.  Developed atrial fibrillation, denies any chest pain.  Ambulating 400 feet with physical therapy.  Hemoglobin improved to 8.4, WBC improved to 15.09 (afebrile), creatinine 1.16 improved and potassium of 4.6.  Weight is down 13 pounds, remains 1 pounds above baseline.  Remains on room air with SPO2 of 97%.  No bowel movement although reports passing gas.        Telemetry: Atrial A-fib 80 -109   IV drips: None       Objective     Visit Vitals  /75 (BP Location: Left arm, Patient Position: Lying)   Pulse 89   Temp 97.8 °F (36.6 °C) (Oral)   Resp 16   Ht 192 cm (75.59\")   Wt 94.3 kg (207 lb 12.8 oz)   SpO2 97%   BMI 25.57 kg/m²   Baseline weight 206 pounds    Intake/Output Summary (Last 24 hours) at 2023 0924  Last data filed at 2023 0902  Gross per 24 hour   Intake 1050 ml   Output 3785 ml   Net -2735 ml     MCT: 160 mL in 24 hours, serosanguineous, no airleak    Lab:     CBC:  Results from last 7 days   Lab Units 23  0338 23  0552 23  0005   WBC 10*3/mm3 15.09* 20.84* 19.15*   HEMATOCRIT % 27.8* 26.0* 25.4*   PLATELETS 10*3/mm3 110* 111* 119*          BMP:  Results from last 7 days   Lab Units 23  0338 23  0552 23  0005   SODIUM mmol/L 138 137 134*   POTASSIUM mmol/L 4.6 5.3* 5.3*   CHLORIDE mmol/L 104 105 102   CO2 mmol/L 29.0 25.0 25.0   GLUCOSE mg/dL 121* 162* 147*   BUN mg/dL 29* 29* 28*   CREATININE mg/dL 1.16 1.27 1.43*          COAG:  Results from last 7 days   Lab Units 23  0350 23  1945   INR  1.38* 1.29*   APTT seconds  --  31.8       IMAGES:       Imaging Results (Last 24 Hours)       Procedure Component Value Units Date/Time    XR Chest " PA & Lateral [[id number]] Collected: 11/24/23 0544     Updated: 11/24/23 0551    Narrative:      EXAMINATION: XR CHEST PA AND LATERAL-     11/24/2023 4:50 AM CST     HISTORY: s/p AVR     The frontal and down to the chest are compared with the previous study  dated 11/23/2023.     There is an area of consolidation in the left lower lobe which is  significantly more progressive since the previous study.     There is a trace right and a small left basal pleural effusion. The left  basal pleural effusion is moderately more progressive since the previous  study.     There is a radiolucency between the area of consolidation in the left  cardiac border which may represent a medially located pneumothorax or  pneumomediastinum?. This was not obvious in the previous study.     There is no pulmonary congestion or pneumothorax.     There is a persistent moderate cardiomegaly.     Mediastinal drain is in place. The previously seen right internal  jugular vascular sheath have been removed.       Impression:      1. Moderate more progressive left lower lobar consolidation. A moderate  more progressive left basal pleural effusion. A trace right basal  pleural effusion.  2. Moderate cardiomegaly.  3. A radiolucency between the area of consolidation in the left lower  lung and the left cardiac border may suggest loculated pneumothorax  medially or pneumomediastinum?. Follow-up study may be obtained.              This report was signed and finalized on 11/24/2023 5:48 AM CST by Dr. Jere Stanford MD.             CXR: Mediastinal chest tube in position.  No pneumothorax.  Stable bibasilar atelectasis      Physical Exam:  General: Alert, oriented. No apparent distress.  Overweight  Cardiovascular: Regular rate and rhythm without murmur, rubs, or gallops.    Pulmonary: Clear to auscultation bilaterally without wheezing, rubs, or rales.  Chest: Sternotomy incision clean, dry, and intact. Sternum stable. No clicks.  Mediastinal chest  tubes to 20 cm suction. No air leak. Fluid is serosanguineous.   Abdomen: Soft, nondistended, and nontender.  Extremities: Warm, moves all extremities. No edema.    Neurologic:  Grossly intact with no focal deficits.            Impression:  Aortic root aneurysm, ascending aortic aneurysm  Bicuspid aortic valve  Hypertension  Hyperlipidemia, on statin        Plan:  Continue bowel regimen, give suppository  Encourage pulmonary toilet and ambulation  Routine postcardiac surgery care  Remove mediastinal chest tubes  Increase metoprolol to 25 mg p.o. twice daily  Initiate amiodarone drip and begin amiodarone 400 mg p.o. twice daily with first oral dose at 2100  Lasix 20 mg IV twice daily     Discussed with patient, spouse and nursing      Cande Mane, APRN  11/24/23  09:24 CST

## 2023-11-24 NOTE — THERAPY TREATMENT NOTE
Acute Care - Physical Therapy Treatment Note  [facility]     Patient Name: Melecio Magallon  : 1960  MRN: [id number]  Today's Date: 2023      Visit Dx:     ICD-10-CM ICD-9-CM   1. Impaired mobility [Z74.09]  Z74.09 799.89   2. Thoracic aortic aneurysm without rupture, unspecified part  I71.20 441.2     Patient Active Problem List   Diagnosis    Bicuspid aortic valve    Mixed hyperlipidemia    LVH (left ventricular hypertrophy)    Family history of early CAD    Family history of prostate cancer    Kidney stone    Nonrheumatic aortic valve stenosis    Primary hypertension    Bruit of right carotid artery    Thoracic aortic aneurysm without rupture    History of adenomatous polyp of colon     Past Medical History:   Diagnosis Date    Aortic aneurysm     Bicuspid aortic valve     Cancer     skin cancer    Coronary artery disease 2018    Aortic Stenosis / aneurysm    Glaucoma     Kidney stone 2020    LVH (left ventricular hypertrophy) 10/29/2020    Mixed hyperlipidemia 10/29/2020    Nonrheumatic aortic valve insufficiency     Nonrheumatic aortic valve stenosis 2022    Primary hypertension 2022     Past Surgical History:   Procedure Laterality Date    ANKLE SURGERY      placed joint back in place    ASCENDING ARCH/HEMIARCH REPLACEMENT N/A 2023    Procedure: AORTIC ROOT with 27mm INSPIRIS, ASCENDING AORTIC ARCH/HEMIARCH REPLACEMENT WITH CIRCULATORY ARREST, BRANDON;  Surgeon: Ramon Kumar MD;  Location: [facility] OR;  Service: Cardiothoracic;  Laterality: N/A;    CARDIAC CATHETERIZATION N/A 2023    Procedure: Left Heart Cath;  Surgeon: Raul Monteiro MD;  Location: [facility] CATH INVASIVE LOCATION;  Service: Cardiology;  Laterality: N/A;    COLONOSCOPY  2011    normal    COLONOSCOPY N/A 2023    Procedure: COLONOSCOPY WITH ANESTHESIA;  Surgeon: Chemo Williamson MD;  Location: [facility] ENDOSCOPY;  Service: Gastroenterology;  Laterality: N/A;  Pre: History of adenomatous polyp  of colon;  Post: Polyp;  Johny Luis MD    EXTRACORPOREAL SHOCK WAVE LITHOTRIPSY (ESWL) Right 11/30/2022    Procedure: RIGHT EXTRACORPOREAL SHOCKWAVE LITHOTRIPSY;  Surgeon: Venkat Murphy MD;  Location: [facility] OR;  Service: Urology;  Laterality: Right;    LITHOTRIPSY  November 2022     PT Assessment (last 12 hours)       PT Evaluation and Treatment       Row Name 11/24/23 1105          Physical Therapy Time and Intention    Subjective Information no complaints  -GO     Document Type therapy note (daily note)  -GO     Mode of Treatment physical therapy  -GO       Row Name 11/24/23 1105          General Information    Existing Precautions/Restrictions fall;sternal  -GO       Row Name 11/24/23 1105          Pain    Pretreatment Pain Rating 2/10  -GO     Posttreatment Pain Rating 2/10  -GO     Pain Location - chest  -GO       Row Name 11/24/23 1105          Bed Mobility    Rolling Right [place] (Bed Mobility) independent  -GO     Sidelying-Sit-Sidelying [place] (Bed Mobility) independent  -GO     Comment, (Bed Mobility) x 2  -GO       Row Name 11/24/23 1105          Sit-Stand Transfer    Sit-Stand [place] (Transfers) independent  -GO       Row Name 11/24/23 1105          Stand-Sit Transfer    Stand-Sit [place] (Transfers) independent  -GO       Row Name 11/24/23 1105          Gait/Stairs (Locomotion)    [place] Level (Gait) independent  -GO     Distance in Feet (Gait) 480  -GO     Deviations/Abnormal Patterns (Gait) thomas decreased  -GO     [place] Level (Stairs) independent  -GO     Number of Steps (Stairs) 3  -GO     Ascending Technique (Stairs) step-over-step  -GO     Descending Technique (Stairs) step-to-step  -GO       Row Name 11/24/23 1105          Motor Skills    Comments, Therapeutic Exercise protocol x15  -GO       Row Name             Wound 11/21/23 1131 sternal Incision    Wound - Properties Group Placement Date: 11/21/23  -SF Placement Time: 1131 -SF Present on  Original Admission: N  -SF Location: sternal  -SF Primary Wound Type: Incision  -SF    Retired Wound - Properties Group Placement Date: 11/21/23  -SF Placement Time: 1131  -SF Present on Original Admission: N  -SF Location: sternal  -SF Primary Wound Type: Incision  -SF    Retired Wound - Properties Group Date first assessed: 11/21/23  -SF Time first assessed: 1131  -SF Present on Original Admission: N  -SF Location: sternal  -SF Primary Wound Type: Incision  -SF      Row Name 11/24/23 1105          Positioning and Restraints    Pre-Treatment Position sitting in chair/recliner  -GO     In Chair sitting;call light within reach;encouraged to call for assist;with family/caregiver  -GO               User Key  (r) = Recorded By, (t) = Taken By, (c) = Cosigned By      Initials Name Provider Type    Marie Michel, PTA Physical Therapist Assistant    Fior Wong, RN Registered Nurse                    Physical Therapy Education       Title: PT OT SLP Therapies (In Progress)       Topic: Physical Therapy (In Progress)       Point: Mobility training (Done)       Learning Progress Summary             Patient Acceptance, E,D, DU,VU by GO at 11/24/2023 1200    Comment: Stairs and bed transfers    Acceptance, E, VU,DU by SANTA at 11/22/2023 0849    Comment: sternal prec, benefits of activiyt, progression of PT                         Point: Home exercise program (Not Started)       Learner Progress:  Not documented in this visit.              Point: Body mechanics (Not Started)       Learner Progress:  Not documented in this visit.              Point: Precautions (Done)       Learning Progress Summary             Patient Acceptance, E, VU,DU by SANTA at 11/22/2023 0849    Comment: sternal prec, benefits of activiyt, progression of PT                                         User Key       Initials Effective Dates Name Provider Type Discipline    SANTA 02/03/23 -  Bright Sandra PT DPT Physical Therapist PT    GO 02/03/23  -  Marie Yusuf, ZOILA Physical Therapist Assistant PT                  PT Recommendation and Plan         Outcome Measures       Row Name 11/24/23 1100 11/23/23 0731          How much help from another person do you currently need...    Turning from your back to your side while in flat bed without using bedrails? 4  -GO 3  -MF     Moving from lying on back to sitting on the side of a flat bed without bedrails? 4  -GO 3  -MF     Moving to and from a bed to a chair (including a wheelchair)? 4  -GO 3  -MF     Standing up from a chair using your arms (e.g., wheelchair, bedside chair)? 4  -GO 3  -MF     Climbing 3-5 steps with a railing? 4  -GO 3  -MF     To walk in hospital room? 4  -GO 3  -MF     AM-PAC 6 Clicks Score (PT) 24  -GO 18  -MF     Highest Level of Mobility Goal 8 --> Walked 250 feet or more  -GO 6 --> Walk 10 steps or more  -MF        Functional Assessment    Outcome Measure Options -- AM-PAC 6 Clicks Basic Mobility (PT)  -               User Key  (r) = Recorded By, (t) = Taken By, (c) = Cosigned By      Initials Name Provider Type    Marie Michel, ZOILA Physical Therapist Assistant     Nasreen Stephenson, ZOILA Physical Therapist Assistant                     Time Calculation:    PT Charges       Row Name 11/24/23 1202 11/24/23 1201          Time Calculation    Start Time 1105  -GO 1105  -GO     Stop Time 1128  -GO 1127  -GO     Time Calculation (min) 23 min  -GO 22 min  -GO        Timed Charges    68395 - Gait Training Minutes  23  -GO 2  -GO        Total Minutes    Timed Charges Total Minutes 23  -GO 2  -GO      Total Minutes 23  -GO 2  -GO               User Key  (r) = Recorded By, (t) = Taken By, (c) = Cosigned By      Initials Name Provider Type    Marie Michel PTA Physical Therapist Assistant                  Therapy Charges for Today       Code Description Service Date Service Provider Modifiers Qty    48898759357 HC GAIT TRAINING EA 15 MIN 11/24/2023 Marie Yusuf, ZOILA GP  2            PT G-Codes  Outcome Measure Options: AM-PAC 6 Clicks Basic Mobility (PT)  AM-PAC 6 Clicks Score (PT): 24    Marie Yusuf, PTA  11/24/2023

## 2023-11-24 NOTE — PLAN OF CARE
Goal Outcome Evaluation:      Patient is independent in transfers, stair climbing and walking over 400'. Patient has met P.T. goals and is D/C'd from P.T.

## 2023-11-25 ENCOUNTER — APPOINTMENT (OUTPATIENT)
Dept: GENERAL RADIOLOGY | Facility: HOSPITAL | Age: 63
DRG: 220 | End: 2023-11-25
Payer: COMMERCIAL

## 2023-11-25 LAB
ANION GAP SERPL CALCULATED.3IONS-SCNC: 8 MMOL/L (ref 5–15)
BH BB BLOOD EXPIRATION DATE: NORMAL
BH BB BLOOD TYPE BARCODE: 9500
BH BB DISPENSE STATUS: NORMAL
BH BB PRODUCT CODE: NORMAL
BH BB UNIT NUMBER: NORMAL
BUN SERPL-MCNC: 26 MG/DL (ref 8–23)
BUN/CREAT SERPL: 21.5 (ref 7–25)
CALCIUM SPEC-SCNC: 9.1 MG/DL (ref 8.6–10.5)
CHLORIDE SERPL-SCNC: 101 MMOL/L (ref 98–107)
CO2 SERPL-SCNC: 29 MMOL/L (ref 22–29)
CREAT SERPL-MCNC: 1.21 MG/DL (ref 0.76–1.27)
CROSSMATCH INTERPRETATION: NORMAL
DEPRECATED RDW RBC AUTO: 46.1 FL (ref 37–54)
EGFRCR SERPLBLD CKD-EPI 2021: 67.7 ML/MIN/1.73
ERYTHROCYTE [DISTWIDTH] IN BLOOD BY AUTOMATED COUNT: 14.2 % (ref 12.3–15.4)
GLUCOSE BLDC GLUCOMTR-MCNC: 111 MG/DL (ref 70–130)
GLUCOSE BLDC GLUCOMTR-MCNC: 115 MG/DL (ref 70–130)
GLUCOSE BLDC GLUCOMTR-MCNC: 137 MG/DL (ref 70–130)
GLUCOSE BLDC GLUCOMTR-MCNC: 144 MG/DL (ref 70–130)
GLUCOSE SERPL-MCNC: 116 MG/DL (ref 65–99)
HCT VFR BLD AUTO: 27 % (ref 37.5–51)
HGB BLD-MCNC: 8.8 G/DL (ref 13–17.7)
MCH RBC QN AUTO: 29 PG (ref 26.6–33)
MCHC RBC AUTO-ENTMCNC: 32.6 G/DL (ref 31.5–35.7)
MCV RBC AUTO: 89.1 FL (ref 79–97)
PLATELET # BLD AUTO: 107 10*3/MM3 (ref 140–450)
PMV BLD AUTO: 12.4 FL (ref 6–12)
POTASSIUM SERPL-SCNC: 4 MMOL/L (ref 3.5–5.2)
QT INTERVAL: 342 MS
QTC INTERVAL: 422 MS
RBC # BLD AUTO: 3.03 10*6/MM3 (ref 4.14–5.8)
SODIUM SERPL-SCNC: 138 MMOL/L (ref 136–145)
UNIT  ABO: NORMAL
UNIT  RH: NORMAL
WBC NRBC COR # BLD AUTO: 10.82 10*3/MM3 (ref 3.4–10.8)

## 2023-11-25 PROCEDURE — 80048 BASIC METABOLIC PNL TOTAL CA: CPT | Performed by: SURGERY

## 2023-11-25 PROCEDURE — 82948 REAGENT STRIP/BLOOD GLUCOSE: CPT

## 2023-11-25 PROCEDURE — 25010000002 AMIODARONE IN DEXTROSE 5% 360-4.14 MG/200ML-% SOLUTION: Performed by: SURGERY

## 2023-11-25 PROCEDURE — 25010000002 ENOXAPARIN PER 10 MG: Performed by: SURGERY

## 2023-11-25 PROCEDURE — 85027 COMPLETE CBC AUTOMATED: CPT | Performed by: SURGERY

## 2023-11-25 PROCEDURE — 71045 X-RAY EXAM CHEST 1 VIEW: CPT

## 2023-11-25 PROCEDURE — 25010000002 FUROSEMIDE PER 20 MG: Performed by: SURGERY

## 2023-11-25 RX ORDER — AMIODARONE HYDROCHLORIDE 200 MG/1
400 TABLET ORAL
Status: DISCONTINUED | OUTPATIENT
Start: 2023-11-25 | End: 2023-11-25

## 2023-11-25 RX ORDER — AMIODARONE HYDROCHLORIDE 200 MG/1
400 TABLET ORAL EVERY 12 HOURS SCHEDULED
Status: DISCONTINUED | OUTPATIENT
Start: 2023-11-25 | End: 2023-11-26 | Stop reason: HOSPADM

## 2023-11-25 RX ORDER — FUROSEMIDE 10 MG/ML
20 INJECTION INTRAMUSCULAR; INTRAVENOUS DAILY
Status: DISCONTINUED | OUTPATIENT
Start: 2023-11-26 | End: 2023-11-26

## 2023-11-25 RX ORDER — FUROSEMIDE 10 MG/ML
20 INJECTION INTRAMUSCULAR; INTRAVENOUS ONCE
Status: COMPLETED | OUTPATIENT
Start: 2023-11-25 | End: 2023-11-25

## 2023-11-25 RX ADMIN — FUROSEMIDE 20 MG: 10 INJECTION, SOLUTION INTRAMUSCULAR; INTRAVENOUS at 12:59

## 2023-11-25 RX ADMIN — ATORVASTATIN CALCIUM 20 MG: 10 TABLET, FILM COATED ORAL at 21:37

## 2023-11-25 RX ADMIN — BISACODYL 10 MG: 5 TABLET ORAL at 21:37

## 2023-11-25 RX ADMIN — ACETAMINOPHEN 650 MG: 325 TABLET, FILM COATED ORAL at 03:49

## 2023-11-25 RX ADMIN — METOPROLOL TARTRATE 25 MG: 25 TABLET, FILM COATED ORAL at 21:37

## 2023-11-25 RX ADMIN — AMIODARONE HYDROCHLORIDE 400 MG: 200 TABLET ORAL at 21:38

## 2023-11-25 RX ADMIN — ACETAMINOPHEN 650 MG: 325 TABLET, FILM COATED ORAL at 08:39

## 2023-11-25 RX ADMIN — PANTOPRAZOLE SODIUM 40 MG: 40 TABLET, DELAYED RELEASE ORAL at 06:05

## 2023-11-25 RX ADMIN — AMIODARONE HYDROCHLORIDE 0.5 MG/MIN: 1.8 INJECTION, SOLUTION INTRAVENOUS at 17:54

## 2023-11-25 RX ADMIN — ACETAMINOPHEN 650 MG: 325 TABLET, FILM COATED ORAL at 21:49

## 2023-11-25 RX ADMIN — LATANOPROST 1 DROP: 50 SOLUTION/ DROPS OPHTHALMIC at 21:37

## 2023-11-25 RX ADMIN — ENOXAPARIN SODIUM 40 MG: 100 INJECTION SUBCUTANEOUS at 08:39

## 2023-11-25 RX ADMIN — ASPIRIN 81 MG: 81 TABLET, COATED ORAL at 08:39

## 2023-11-25 RX ADMIN — METOPROLOL TARTRATE 25 MG: 25 TABLET, FILM COATED ORAL at 08:39

## 2023-11-25 RX ADMIN — POLYETHYLENE GLYCOL 3350 17 G: 17 POWDER, FOR SOLUTION ORAL at 08:40

## 2023-11-25 RX ADMIN — AMIODARONE HYDROCHLORIDE 400 MG: 200 TABLET ORAL at 12:58

## 2023-11-25 RX ADMIN — AMIODARONE HYDROCHLORIDE 0.5 MG/MIN: 1.8 INJECTION, SOLUTION INTRAVENOUS at 04:15

## 2023-11-25 RX ADMIN — BISACODYL 10 MG: 10 SUPPOSITORY RECTAL at 08:40

## 2023-11-25 RX ADMIN — ACETAMINOPHEN 650 MG: 325 TABLET, FILM COATED ORAL at 15:41

## 2023-11-25 RX ADMIN — BISACODYL 10 MG: 5 TABLET ORAL at 08:39

## 2023-11-25 NOTE — PLAN OF CARE
Goal Outcome Evaluation:  Plan of Care Reviewed With: patient           Outcome Evaluation: no c/o pain this shift. amio gtts 16.67 ml/hr. Chest tube dressing CDI. AF 74-96 on tele. Safety maintained.

## 2023-11-25 NOTE — PROGRESS NOTES
"Patient name: Melecio Magallon  Patient : 1960  VISIT # [id number]  MR #[id number]    Procedure:Procedure(s):  AORTIC ROOT with 27mm INSPIRIS, ASCENDING AORTIC ARCH/HEMIARCH REPLACEMENT WITH CIRCULATORY ARREST, BRANDON  Procedure Date:2023  POD:4 Days Post-Op    Subjective       Resting in chair.  Ambulating well.  Weight is down 8 pounds, 7 pounds below baseline.  Remains on room air.  Continues to be in atrial fibrillation and on amiodarone IV.  Denies any chest pain or shortness of breath and reports overall feeling better.  Hemoglobin stable at 8.8, WBC improved to 10.82 and creatinine 1.21.  Has had a small bowel movement and received a suppository this morning.          Telemetry: Atrial A-fib 81 -99   IV drips: Amiodarone       Objective     Visit Vitals  BP 98/58 (BP Location: Right arm, Patient Position: Sitting)   Pulse 85   Temp 98.5 °F (36.9 °C) (Oral)   Resp 18   Ht 192 cm (75.59\")   Wt 90.5 kg (199 lb 9.6 oz)   SpO2 98%   BMI 24.56 kg/m²   Baseline weight 206 pounds    Intake/Output Summary (Last 24 hours) at 2023 1124  Last data filed at 2023 0846  Gross per 24 hour   Intake 800 ml   Output 3225 ml   Net -2425 ml       Lab:     CBC:  Results from last 7 days   Lab Units 23  0338 23  0552   WBC 10*3/mm3 10.82* 15.09* 20.84*   HEMATOCRIT % 27.0* 27.8* 26.0*   PLATELETS 10*3/mm3 107* 110* 111*          BMP:  Results from last 7 days   Lab Units 23  03123  0338 23  0552   SODIUM mmol/L 138 138 137   POTASSIUM mmol/L 4.0 4.6 5.3*   CHLORIDE mmol/L 101 104 105   CO2 mmol/L 29.0 29.0 25.0   GLUCOSE mg/dL 116* 121* 162*   BUN mg/dL 26* 29* 29*   CREATININE mg/dL 1.21 1.16 1.27          COAG:  Results from last 7 days   Lab Units 23  0350 235   INR  1.38* 1.29*   APTT seconds  --  31.8       IMAGES:       Imaging Results (Last 24 Hours)       Procedure Component Value Units Date/Time    XR Chest 1 View [[id number]] Collected: " 11/25/23 0823     Updated: 11/25/23 0828    Narrative:      EXAMINATION: XR CHEST 1 VW- 11/25/2023 8:23 AM CST     HISTORY: Post-Op Heart Surgery.     REPORT: A frontal view of the chest was obtained.     COMPARISON: Chest x-ray 11/24/2023 0450 hours.     There is volume loss in the lung bases as before, greater on the left.  Decreased volume of pneumomediastinum is noted. The mediastinal drains  have been removed. Heart size is normal. There has been median  sternotomy and aortic valve replacement. No pneumothorax is identified.  The upper abdomen appears unremarkable.       Impression:      Improved aeration of the lungs with residual basilar  atelectasis greater on the left. Decreased pneumomediastinum. Interval  removal of the mediastinal drains.     This report was signed and finalized on 11/25/2023 8:25 AM CST by Dr. Sarmad Rodriguez MD.             CXR: No pneumothorax.  Significant improvement in variation.      Physical Exam:  General: Alert, oriented. No apparent distress.  Overweight  Cardiovascular: Regular rate and rhythm without murmur, rubs, or gallops.    Pulmonary: Clear to auscultation bilaterally without wheezing, rubs, or rales.  Chest: Sternotomy incision clean, dry, and intact. Sternum stable. No clicks.    Abdomen: Soft, nondistended, and nontender.  Extremities: Warm, moves all extremities. No edema.    Neurologic:  Grossly intact with no focal deficits.            Impression:  Aortic root aneurysm, ascending aortic aneurysm  Bicuspid aortic valve  Hypertension  Hyperlipidemia, on statin        Plan:  Continue bowel regimen, suppository given  Encourage pulmonary toilet and ambulation  Routine postcardiac surgery care  Continue amiodarone drip until conversion   Continue amiodarone 400 mg p.o. twice daily   Lasix 20 mg IV daily     Anticipate possible discharge home tomorrow.    Discussed with patient, spouse and nursing      ANIBAL Delgado  11/25/23  11:24 CST

## 2023-11-25 NOTE — PLAN OF CARE
Goal Outcome Evaluation:  Plan of Care Reviewed With: patient        Progress: improving  Outcome Evaluation: VSS. AF  on tele. Amiodarone gtt continues. One moderate BM today after suppository. Good urine output. Room air. Doing well with IS. Ambulating very well with no weakness or SOB. Chest tubes pulled this AM per order; has had some drainage from site requiring one dressing change this shift. Family at bedside. No c/o pain this shift.

## 2023-11-25 NOTE — PLAN OF CARE
Goal Outcome Evaluation:  Plan of Care Reviewed With: (P) patient, spouse        Progress: (P) improving  Outcome Evaluation: (P) Pt c/o of no pain this shift. Ambulating x5 in hallway w/ family. SBP is in low side. RA. Afib 80-95 per tele, continue amio drip as ordered. Started amio PO BID today. Suppository given this AM, no result yet. Lasix daily now. Continue monitoring. Possible discharge tomorrow.

## 2023-11-26 ENCOUNTER — APPOINTMENT (OUTPATIENT)
Dept: GENERAL RADIOLOGY | Facility: HOSPITAL | Age: 63
DRG: 220 | End: 2023-11-26
Payer: COMMERCIAL

## 2023-11-26 ENCOUNTER — READMISSION MANAGEMENT (OUTPATIENT)
Dept: CALL CENTER | Facility: HOSPITAL | Age: 63
End: 2023-11-26
Payer: COMMERCIAL

## 2023-11-26 VITALS
DIASTOLIC BLOOD PRESSURE: 54 MMHG | TEMPERATURE: 98 F | HEART RATE: 73 BPM | WEIGHT: 197.4 LBS | SYSTOLIC BLOOD PRESSURE: 99 MMHG | BODY MASS INDEX: 24.04 KG/M2 | RESPIRATION RATE: 18 BRPM | HEIGHT: 76 IN | OXYGEN SATURATION: 96 %

## 2023-11-26 LAB
ANION GAP SERPL CALCULATED.3IONS-SCNC: 9 MMOL/L (ref 5–15)
BUN SERPL-MCNC: 27 MG/DL (ref 8–23)
BUN/CREAT SERPL: 23.5 (ref 7–25)
CALCIUM SPEC-SCNC: 8.8 MG/DL (ref 8.6–10.5)
CHLORIDE SERPL-SCNC: 99 MMOL/L (ref 98–107)
CO2 SERPL-SCNC: 28 MMOL/L (ref 22–29)
CREAT SERPL-MCNC: 1.15 MG/DL (ref 0.76–1.27)
DEPRECATED RDW RBC AUTO: 45.9 FL (ref 37–54)
EGFRCR SERPLBLD CKD-EPI 2021: 72 ML/MIN/1.73
ERYTHROCYTE [DISTWIDTH] IN BLOOD BY AUTOMATED COUNT: 13.9 % (ref 12.3–15.4)
GLUCOSE BLDC GLUCOMTR-MCNC: 113 MG/DL (ref 70–130)
GLUCOSE BLDC GLUCOMTR-MCNC: 118 MG/DL (ref 70–130)
GLUCOSE SERPL-MCNC: 117 MG/DL (ref 65–99)
HCT VFR BLD AUTO: 27.7 % (ref 37.5–51)
HGB BLD-MCNC: 8.6 G/DL (ref 13–17.7)
MCH RBC QN AUTO: 27.9 PG (ref 26.6–33)
MCHC RBC AUTO-ENTMCNC: 31 G/DL (ref 31.5–35.7)
MCV RBC AUTO: 89.9 FL (ref 79–97)
PLATELET # BLD AUTO: 133 10*3/MM3 (ref 140–450)
PMV BLD AUTO: 12 FL (ref 6–12)
POTASSIUM SERPL-SCNC: 4 MMOL/L (ref 3.5–5.2)
RBC # BLD AUTO: 3.08 10*6/MM3 (ref 4.14–5.8)
SODIUM SERPL-SCNC: 136 MMOL/L (ref 136–145)
WBC NRBC COR # BLD AUTO: 8.57 10*3/MM3 (ref 3.4–10.8)

## 2023-11-26 PROCEDURE — 71045 X-RAY EXAM CHEST 1 VIEW: CPT

## 2023-11-26 PROCEDURE — 25010000002 ENOXAPARIN PER 10 MG: Performed by: SURGERY

## 2023-11-26 PROCEDURE — 25010000002 FUROSEMIDE PER 20 MG: Performed by: NURSE PRACTITIONER

## 2023-11-26 PROCEDURE — 25010000002 AMIODARONE IN DEXTROSE 5% 360-4.14 MG/200ML-% SOLUTION: Performed by: SURGERY

## 2023-11-26 PROCEDURE — 80048 BASIC METABOLIC PNL TOTAL CA: CPT | Performed by: SURGERY

## 2023-11-26 PROCEDURE — 85027 COMPLETE CBC AUTOMATED: CPT | Performed by: SURGERY

## 2023-11-26 PROCEDURE — 82948 REAGENT STRIP/BLOOD GLUCOSE: CPT

## 2023-11-26 RX ORDER — PANTOPRAZOLE SODIUM 40 MG/1
40 TABLET, DELAYED RELEASE ORAL
Qty: 30 TABLET | Refills: 0 | Status: SHIPPED | OUTPATIENT
Start: 2023-11-27

## 2023-11-26 RX ORDER — AMIODARONE HYDROCHLORIDE 400 MG/1
400 TABLET ORAL EVERY 12 HOURS SCHEDULED
Qty: 14 TABLET | Refills: 0 | Status: SHIPPED | OUTPATIENT
Start: 2023-11-26 | End: 2023-11-28 | Stop reason: SDUPTHER

## 2023-11-26 RX ADMIN — AMIODARONE HYDROCHLORIDE 0.5 MG/MIN: 1.8 INJECTION, SOLUTION INTRAVENOUS at 04:10

## 2023-11-26 RX ADMIN — ACETAMINOPHEN 650 MG: 325 TABLET, FILM COATED ORAL at 04:01

## 2023-11-26 RX ADMIN — POLYETHYLENE GLYCOL 3350 17 G: 17 POWDER, FOR SOLUTION ORAL at 08:19

## 2023-11-26 RX ADMIN — PANTOPRAZOLE SODIUM 40 MG: 40 TABLET, DELAYED RELEASE ORAL at 06:40

## 2023-11-26 RX ADMIN — BISACODYL 10 MG: 5 TABLET ORAL at 08:21

## 2023-11-26 RX ADMIN — ACETAMINOPHEN 650 MG: 325 TABLET, FILM COATED ORAL at 08:22

## 2023-11-26 RX ADMIN — METOPROLOL TARTRATE 25 MG: 25 TABLET, FILM COATED ORAL at 08:20

## 2023-11-26 RX ADMIN — FUROSEMIDE 20 MG: 10 INJECTION, SOLUTION INTRAMUSCULAR; INTRAVENOUS at 08:18

## 2023-11-26 RX ADMIN — ENOXAPARIN SODIUM 40 MG: 100 INJECTION SUBCUTANEOUS at 08:19

## 2023-11-26 RX ADMIN — ASPIRIN 81 MG: 81 TABLET, COATED ORAL at 08:20

## 2023-11-26 RX ADMIN — AMIODARONE HYDROCHLORIDE 400 MG: 200 TABLET ORAL at 08:21

## 2023-11-26 RX ADMIN — BISACODYL 10 MG: 10 SUPPOSITORY RECTAL at 08:21

## 2023-11-26 NOTE — OUTREACH NOTE
Prep Survey      Flowsheet Row Responses   [facility] facility patient discharged from? [place]   Is LACE score < 7 ? No   Eligibility Readm Mgmt   Discharge diagnosis Ascending aorta and aortic root replacement with valved conduit and coronary reconstruction   Does the patient have one of the following disease processes/diagnoses(primary or secondary)? Cardiothoracic surgery   Does the patient have Home health ordered? No   Is there a DME ordered? No   Prep survey completed? Yes            Laureen Hoyos Registered Nurse

## 2023-11-26 NOTE — PLAN OF CARE
Problem: Adult Inpatient Plan of Care  Goal: Plan of Care Review  Outcome: Ongoing, Progressing  Flowsheets (Taken 11/26/2023 0602)  Progress: improving  Plan of Care Reviewed With: patient  Outcome Evaluation: Pt ambulated this AM to 4C & back. Pt ambulates well. Pt denies pain. Dressings KRYS, steri strips in place, dry, intact. IV Amio gtt infusing as ordered. PO Amio given as ordered. AF HR: 68-97 on tele. Safety maintained. Possible discharge home soon today or tomorrow.

## 2023-11-26 NOTE — PROGRESS NOTES
"Patient name: Melecio Magallon  Patient : 1960  VISIT # [id number]  MR #[id number]    Procedure:Procedure(s):  AORTIC ROOT with 27mm INSPIRIS, ASCENDING AORTIC ARCH/HEMIARCH REPLACEMENT WITH CIRCULATORY ARREST, BRANDON  Procedure Date:2023  POD:5 Days Post-Op    Subjective       Up and resting in recliner.  Converted to sinus rhythm at 9 AM, amiodarone drip discontinued.  Creatinine 1.15 with potassium of 4.0.  Hemoglobin stable at 8.6 and WBC normalized 8.57.  Weight is down additional 2 pounds and is 9 pounds below baseline.  Remains on room air with SPO2 of 98%.  Eager to be discharged home.  Pain has been controlled with Tylenol.        Telemetry: Sinus 73  IV drips: None       Objective     Visit Vitals  BP 99/59 (BP Location: Left arm, Patient Position: Sitting)   Pulse 78   Temp 98.2 °F (36.8 °C) (Oral)   Resp 18   Ht 192 cm (75.59\")   Wt 89.5 kg (197 lb 6.4 oz)   SpO2 98%   BMI 24.29 kg/m²   Baseline weight 206 pounds    Intake/Output Summary (Last 24 hours) at 2023 1034  Last data filed at 2023 0800  Gross per 24 hour   Intake 820 ml   Output 3325 ml   Net -2505 ml       Lab:     CBC:  Results from last 7 days   Lab Units 23  0338   WBC 10*3/mm3 8.57 10.82* 15.09*   HEMATOCRIT % 27.7* 27.0* 27.8*   PLATELETS 10*3/mm3 133* 107* 110*          BMP:  Results from last 7 days   Lab Units 23  04523  0318 23  0338   SODIUM mmol/L 136 138 138   POTASSIUM mmol/L 4.0 4.0 4.6   CHLORIDE mmol/L 99 101 104   CO2 mmol/L 28.0 29.0 29.0   GLUCOSE mg/dL 117* 116* 121*   BUN mg/dL 27* 26* 29*   CREATININE mg/dL 1.15 1.21 1.16          COAG:  Results from last 7 days   Lab Units 23  0350 23   INR  1.38* 1.29*   APTT seconds  --  31.8       IMAGES:       Imaging Results (Last 24 Hours)       Procedure Component Value Units Date/Time    XR Chest 1 View [[id number]] Collected: 23     Updated: 23    Narrative:   "    EXAMINATION: XR CHEST 1 VW- 11/26/2023 8:23 AM CST     HISTORY: Post-Op Heart Surgery.     REPORT: A frontal view of the chest was obtained.     COMPARISON: Chest x-ray 11/25/2023 0343 hours.     There is volume loss in the lung bases as before, greater on the left.  The atelectasis at the medial left base appears increased or less likely  this could represent developing pneumonia. No pneumothorax is  identified. Heart size is normal. Previous median sternotomy and aortic  valve replacement is noted. There is a questionable tiny left pleural  effusion. No other change.       Impression:      Mild increase in probable atelectasis in the left lung base,  less likely developing pneumonia. No pneumothorax is identified. There  is a questionable tiny left pleural effusion.     This report was signed and finalized on 11/26/2023 8:24 AM CST by Dr. Sarmad Rodriguez MD.             CXR: No pneumothorax.  Mild left lung atelectasis.    Physical Exam:  General: Alert, oriented. No apparent distress.  Overweight  Cardiovascular: Regular rate and rhythm without murmur, rubs, or gallops.    Pulmonary: Clear to auscultation bilaterally without wheezing, rubs, or rales.  Chest: Sternotomy incision clean, dry, and intact. Sternum stable. No clicks.    Abdomen: Soft, nondistended, and nontender.  Extremities: Warm, moves all extremities. No edema.    Neurologic:  Grossly intact with no focal deficits.            Impression:  Aortic root aneurysm, ascending aortic aneurysm  Bicuspid aortic valve  Hypertension  Hyperlipidemia, on statin        Plan:  Continue bowel regimen, suppository given  Encourage pulmonary toilet and ambulation  Routine postcardiac surgery care  Remove pacing wires  Will discharge home on Eliquis prophylactically due to recent atrial fibrillation    Discharge home, will follow up with ANIBAL Miller in 1 week and with Dr. Kumar in 6 weeks.    Discussed with patient, spouse and nursing      Cande Mane,  APRN  11/26/23  10:34 CST

## 2023-11-27 ENCOUNTER — TELEPHONE (OUTPATIENT)
Dept: CARDIAC SURGERY | Facility: CLINIC | Age: 63
End: 2023-11-27
Payer: COMMERCIAL

## 2023-11-27 NOTE — DISCHARGE SUMMARY
[facility] Cardiothoracic Surgery  DISCHARGE SUMMARY        Date of Admission: 11/21/2023  Date of Discharge:  11/26/2023  Primary Care Physician: Johny Luis MD    Discharge Diagnoses:  Active Hospital Problems    Diagnosis     **Thoracic aortic aneurysm without rupture     Mixed hyperlipidemia     Bicuspid aortic valve        Procedures Performed:   Aortic Root Replacement with BioBentall (34mm Dacron graft, 27mm Inspiris valve), Ascending Aortic Replacement with Hemiarch Replacement with 28mm Side Branched Dacron graft by Dr. Kumar on 11/21/2023    HPI:  Mr. Melecio Magallon is a 63 y.o. male who presented to Dr. Kumar as an outpatient with a sending aortic aneurysm and bicuspid aortic valve.  He underwent recent repeat echocardiogram revealing worsening aortic stenosis of his bicuspid valve and severe moderate AI.  Ascending aorta measured 5 cm in maximum dimension on most recent CT scan.  Given this Dr. Kumar discussed with him proceeding with bio Bentall and hemiarch replacement.  Patient verbalized understanding to the risk and benefits and agreed to proceed.  He had normal coronary arteries on coronary angiography and LVEF was normal on echocardiogram.    Hospital Course: On 11/21/2023, Mr. Magallon was taken to the operating room for aortic root replacement with bio Bentall, ascending aortic replacement with hemiarch replacement.  See separate op note by Dr. Kumar detailing the operation.  Following surgery he was transferred to the ICU in stable but guarded condition.  He remained hemodynamically stable and was extubated without remark during the evening hours following surgery.  He demonstrated an intact neurological exam and was tolerating nasal cannula.  On the evening of postop day 1, he developed atrial fibrillation with RVR and amiodarone protocol was initiated.  Mediastinal chest tubes were removed without remark on postop day 3 and he met criteria for transfer to .  The  remainder of his hospital stay was significant for encouraging pulmonary toilet and ambulation, diuresis, and pain control.  He ultimately converted to sinus rhythm on the morning of postop day 5.  Pacing wires were removed without remark and he meets criteria for discharge home on postop day 5.  The patient is agreeable to this plan.    He will be discharged home with Eliquis and aspirin due to atrial fibrillation.  He will take amiodarone 400 mg twice daily for 1 week and will decrease to 400 mg daily at postop follow-up if he remains in sinus rhythm.  Routine follow-up with me in 1 week with labs prior to appointment.    Condition on Discharge:  Neurologically intact and has good pain control.  He is eating well and has demonstrable good bowel function.  The sternum is stable without clicks and the saphenectomy incisions are healing nicely.  The heart is in normal sinus rhythm.  He has met all physical therapy criteria and verbalizes understanding of sternotomy precautions.   He verbalizes understanding of a separately handed out cardiac surgery handout.       Discharge Disposition:  Home or Self Care [1]    Discharge Medications:     Discharge Medications        New Medications        Instructions Start Date   amiodarone 400 MG tablet  Commonly known as: PACERONE   400 mg, Oral, Every 12 Hours Scheduled      metoprolol tartrate 25 MG tablet  Commonly known as: LOPRESSOR   25 mg, Oral, Every 12 Hours Scheduled      pantoprazole 40 MG EC tablet  Commonly known as: PROTONIX   40 mg, Oral, Every Early Morning             Continue These Medications        Instructions Start Date   aspirin 81 MG EC tablet   81 mg, Oral, Daily      bimatoprost 0.01 % ophthalmic drops  Commonly known as: LUMIGAN   1 drop, Both Eyes, Nightly      ezetimibe 10 MG tablet  Commonly known as: ZETIA   10 mg, Oral, Daily      rosuvastatin 40 MG tablet  Commonly known as: CRESTOR   40 mg, Oral, Daily      timolol 0.5 % ophthalmic  solution  Commonly known as: TIMOPTIC   1 drop, Both Eyes, 2 Times Daily             Stop These Medications      losartan 25 MG tablet  Commonly known as: COZAAR              Discharge Diet: Regular diet     Discharge Care Plan / Instructions: Please see the separately handed out cardiac surgery handout.  He will not be referred to cardiac rehab at this time due to recent sternotomy.  We will reassess at his postop follow-up visit.    Activity at Discharge:   No heavy lifting greater than 5 pounds or a gallon of milk while maintaining sternal precautions.  Melecio HELTON Colbyjessica has been instructed on an exercise  regiment as detailed in a handed out cardiac surgery handout.    Tobacco:  The patient does not use tobacco products and therefore does not need tobacco cessation education.    BMI: BMI is within normal parameters. No other follow-up for BMI required.      Follow-up Appointments: Melecio Magallon  is requested to see Johny Luis MD within 1-2 weeks from time of discharge, to see Carmen BARNETT in 1 week with labs prior to appointment, to follow-up with Dr. Kumar in 6 weeks,  and to follow-up with Dr. Monteiro of the cardiology service in 6 weeks.

## 2023-11-27 NOTE — PAYOR COMM NOTE
"REF:  [id number]     [facility]  FAX  [phone]       Lalo Magallon \"Rene\" (63 y.o. Male)       Date of Birth   1960    Social Security Number       Address   [address] [place] [address] [place] [address]    Home Phone   [phone]    MRN   [id number]       [employer]azdanism   [facility]    Marital Status                               Admission Date   11/21/23    Admission Type   Elective    Admitting Provider   Kumar, Ramon TAVAREZ MD    Attending Provider       Department, Room/Bed   [facility] 4B, 433/1       Discharge Date   11/26/2023    Discharge Disposition   Home or Self Care    Discharge Destination                                 Attending Provider: (none)   Allergies: No Known Allergies    Isolation: None   Infection: None   Code Status: Prior    Ht: 192 cm (75.59\")   Wt: 89.5 kg (197 lb 6.4 oz)    Admission Cmt: None   Principal Problem: Thoracic aortic aneurysm without rupture [I71.20]                   Active Insurance as of 11/21/2023       Primary Coverage       Payor Plan Insurance Group Employer/Plan Group    [employer] HMO [id number]       Payor Plan Address Payor Plan Phone Number Payor Plan Fax Number Effective Dates    [address] [phone]  12/1/2022 - None Entered    [place] [address]         Subscriber Name Subscriber Birth Date Member ID       LALO MAGALLON 1960 [id number]                     Emergency Contacts        (Rel.) Home Phone Work Phone Mobile Phone    SHEBA MAGALLON (Spouse) -- -- [phone]    NANCY MAGALLON (Son) -- -- [phone]    Sinai Magallon (Relative) [phone] -- --    BALTA MAGALLON (Son) -- -- [phone]              Discharge Order (From admission, onward)       Start     Ordered    11/26/23 1154  Discharge patient  Once        Expected Discharge Date: 11/26/23   Discharge Disposition: Home or Self Care   Physician of Record for Attribution - Please select from Treatment Team: KUMAR, " MU TAVAREZ [[id number]]   Review needed by CMO to determine Physician of Record: No      Question Answer Comment   Physician of Record for Attribution - Please select from Treatment Team MORAN, MU TAVAREZ    Review needed by CMO to determine Physician of Record No        11/26/23 2723

## 2023-11-27 NOTE — TELEPHONE ENCOUNTER
Please notify patient that Eliquis has been sent to his pharmacy.  He should take Eliquis along with aspirin.

## 2023-11-27 NOTE — TELEPHONE ENCOUNTER
Patient was discharged home over the weekend and needs 1 week follow-up with me.  Please call patient to schedule.  Labs prior to appointment.

## 2023-11-28 ENCOUNTER — TELEPHONE (OUTPATIENT)
Dept: CARDIAC SURGERY | Facility: CLINIC | Age: 63
End: 2023-11-28
Payer: COMMERCIAL

## 2023-11-28 RX ORDER — AMIODARONE HYDROCHLORIDE 400 MG/1
400 TABLET ORAL DAILY
Qty: 21 TABLET | Refills: 0 | Status: SHIPPED | OUTPATIENT
Start: 2023-11-28

## 2023-11-28 NOTE — TELEPHONE ENCOUNTER
Please notify patient that I have sent an additional 3 weeks of amiodarone to his pharmacy.  When he picks up new prescription, he will decrease dose to 1 tablet daily as opposed to twice daily and continue this dosing for 3 more weeks.

## 2023-11-28 NOTE — TELEPHONE ENCOUNTER
Pt states he was speaking with someone earlier this morning re: meds and was calling back with more info.  Pt states he has enough Amiodarone to last through Sunday.  Pt has appt with Carmen BARNETT next week Thursday so if pt is to cont med until this appt, he will need a refill on this med.  Can reach pt at #[phone] if any questions/kahm

## 2023-11-28 NOTE — TELEPHONE ENCOUNTER
Patient aware of the above information. He questioned the amount of amiodarone and if he needs to stop once he runs out. Per Carmen GONZALES, he is to take this until his OV with her on 12/7 and if he runs out prior to this, she will send a script in for more. He states he thinks he has enough, but will check and let us know if he will need any more.

## 2023-11-29 ENCOUNTER — READMISSION MANAGEMENT (OUTPATIENT)
Dept: CALL CENTER | Facility: HOSPITAL | Age: 63
End: 2023-11-29
Payer: COMMERCIAL

## 2023-11-29 NOTE — OUTREACH NOTE
CT Surgery Week 1 Survey      Flowsheet Row Responses   [facility] patient discharged from? [facility]   Does the patient have one of the following disease processes/diagnoses(primary or secondary)? Cardiothoracic surgery   Week 1 attempt successful? Yes   Call start time 1555   Call end time 1559   Discharge diagnosis Ascending aorta and aortic root replacement with valved conduit and coronary reconstruction   Person spoke with today (if not patient) and relationship Patient   Meds reviewed with patient/caregiver? Yes   Is the patient having any side effects they believe may be caused by any medication additions or changes? No   Does the patient have all medications related to this admission filled (includes all antibiotics, pain medications, cardiac medications, etc.) Yes   Is the patient taking all medications as directed (includes completed medication regime)? Yes   Comments regarding appointments CT surgeon f/u on 12/7/23 with ANIBAL Miller.   Does the patient have a primary care provider?  Yes   Does the patient have an appointment scheduled with their C/T surgeon? Yes   Has the patient kept scheduled appointments due by today? N/A   Has home health visited the patient within 72 hours of discharge? N/A   Psychosocial issues? No   Comments Pt states he is doing well. No questions or concerns. brief call. Reviewed post op instuctions, no lifting over 5 lbs and watch for s/s of infection in incisions.   Did the patient receive a copy of their discharge instructions? Yes   Nursing interventions Reviewed instructions with patient   What is the patient's perception of their health status since discharge? Improving   Nursing interventions Nurse provided patient education   Is the patient /caregiver able to teach back basic post-op care? Shower daily, No tub bath, swimming, or hot tub until instructed by MD, Use a clean wash cloth and antibacterial bar or liquid soap to clean incisions, Lifting as  instructed by MD in discharge instructions   Is the patient/caregiver able to teach back signs and symptoms of incisional infection? Increased redness, swelling or pain at the incisonal site, Increased drainage or bleeding, Incisional warmth, Pus or odor from incision, Fever   If the patient is a current smoker, are they able to teach back resources for cessation? Not a smoker   Is the patient/caregiver able to teach back the hierarchy of who to call/visit for symptoms/problems? PCP, Specialist, Home health nurse, Urgent Care, ED, 911 Yes   Week 1 call completed? Yes   Graduated Yes   Is the patient interested in additional calls from an ambulatory ? No   Would this patient benefit from a Referral to Amb Social Work? No   Wrap up additional comments Pt denies any questions, needs or concerns.   Call end time 0617              Sheri BENNETT - Registered Nurse

## 2023-12-05 NOTE — PROGRESS NOTES
Subjective   Chief Complaint   Patient presents with    Aortic Stenosis     1WK PO HFU W/ LABS       Patient ID: Melecio Magallon is a 63 y.o. male who is here for follow-up having had Aortic Root Replacement with BioBentall (34mm Dacron graft, 27mm Inspiris valve), Ascending Aortic Replacement with Hemiarch Replacement with 28mm Side Branched Dacron graft by Dr. Kumar on 11/21/2023     History of Present Illness  Mr. Magallon is a 63-year-old male who underwent the above listed procedure by Dr. Kmuar on 11/21/2023.  Postoperative recovery was overall unremarkable.  He did have 1 brief episode of atrial fibrillation with RVR and ultimately converted to sinus rhythm with amiodarone protocol.  He was discharged home with amiodarone 400 mg twice daily for 1 week with plans to decrease dosing to once daily today.  He is on Eliquis and aspirin due to paroxysmal atrial fibrillation.  He is here today for 1 week follow-up with me with labs prior to appointment.  Weight today is up some since discharge and patient states he is now near his baseline weight.  No significant lower extremity edema.  Labs have been completed prior to today's appointment.    Post operative recovery was uneventful without any major complications. Sleep habits are fair. Pain control has been good. No fevers/sweats/chills. No drainage from incisions.  No sternal clicks. No chest pain or shortness of breath. Appetite is good.  He is not diabetic and he does not smoke.  Ambulating 15-20 minutes twice daily.     The following portions of the patient's history were reviewed and updated as appropriate: allergies, current medications, past family history, past medical history, past social history, past surgical history and problem list.    Review of Systems   Constitutional:  Negative for chills, diaphoresis, fatigue and fever.   HENT:  Negative for trouble swallowing and voice change.    Eyes:  Negative for visual disturbance.   Respiratory:  Negative for  "chest tightness and shortness of breath.    Cardiovascular:  Negative for chest pain, palpitations and leg swelling.   Gastrointestinal:  Negative for abdominal pain, diarrhea, nausea and vomiting.   Musculoskeletal:  Negative for arthralgias and myalgias.   Skin:  Negative for color change, pallor, rash and wound.   Neurological:  Negative for dizziness, syncope and light-headedness.   Psychiatric/Behavioral:  Negative for agitation, confusion and sleep disturbance.        Objective   Visit Vitals  /70 (BP Location: Left arm, Patient Position: Sitting, Cuff Size: Adult)   Pulse 56   Ht 192 cm (75.59\")   Wt 96.2 kg (212 lb)   SpO2 99%   BMI 26.09 kg/m²       Physical Exam  Vitals reviewed.   Constitutional:       General: He is not in acute distress.  HENT:      Head: Normocephalic.   Eyes:      Pupils: Pupils are equal, round, and reactive to light.   Cardiovascular:      Rate and Rhythm: Normal rate and regular rhythm.      Heart sounds: Normal heart sounds. No murmur heard.  Pulmonary:      Breath sounds: Normal breath sounds. No wheezing or rales.   Abdominal:      General: There is no distension.      Palpations: Abdomen is soft.      Tenderness: There is no abdominal tenderness.   Musculoskeletal:         General: Swelling present. No tenderness.      Right lower leg: Edema (Trace) present.      Left lower leg: Edema (Trace) present.   Skin:     General: Skin is warm.      Comments: Sternum is stable, no clicks.  Sternal incision is clean dry and intact and healing nicely.   Neurological:      General: No focal deficit present.      Mental Status: He is alert and oriented to person, place, and time.   Psychiatric:         Mood and Affect: Mood normal.         Behavior: Behavior normal.         Thought Content: Thought content normal.         Judgment: Judgment normal.             Assessment & Plan             Diagnoses and all orders for this visit:    1. Nonrheumatic aortic valve stenosis (Primary)  -   "   CBC & Differential; Future  -     Basic Metabolic Panel; Future  -     CT angiogram chest w contrast; Future  -     Adult Transthoracic Echo Complete W/ Cont if Necessary Per Protocol; Future    2. Thoracic aortic aneurysm without rupture, unspecified part  -     CT angiogram chest w contrast; Future  -     Adult Transthoracic Echo Complete W/ Cont if Necessary Per Protocol; Future           Overall, Melecio Magallon is doing well.  Hemoglobin today is stable at 8.6.  Have advised him to begin a multivitamin with iron.  Creatinine today is up to 1.3.  No further Lasix.  I have advised him to hydrate with Gatorade and we will plan to repeat labs in 1 week.  Amiodarone has been decreased to 400 mg once daily to be taken for 3 more weeks and then he can discontinue this medication.  We discussed current sternotomy precautions and how these will not be advanced yet. Following post op cardiac surgery home instructions. Provided support and encouragement. All questions have been answered to the best of my ability. Will discuss starting cardiac rehabilitation at official 1 month post op visit. Patient has follow up with Dr. Kumar in a few weeks.  A repeat transthoracic echocardiogram and CTA of the chest will be arranged prior to this appointment.  Patient has follow up with Cardiology in a few weeks. Patient has been instructed to contact our office with any questions or concerns should they arise prior to the next office visit.  Overall very happy with his progress.  Keep scheduled follow-up with Dr. Kumar next month.    BMI is within normal parameters. No other follow-up for BMI required.    Melecio Magallon is a non-smoker and therefore does not need tobacco cessation education/counseling.

## 2023-12-07 ENCOUNTER — OFFICE VISIT (OUTPATIENT)
Dept: CARDIAC SURGERY | Facility: CLINIC | Age: 63
End: 2023-12-07
Payer: COMMERCIAL

## 2023-12-07 ENCOUNTER — LAB (OUTPATIENT)
Dept: LAB | Facility: HOSPITAL | Age: 63
End: 2023-12-07
Payer: COMMERCIAL

## 2023-12-07 VITALS
SYSTOLIC BLOOD PRESSURE: 115 MMHG | HEIGHT: 76 IN | BODY MASS INDEX: 25.82 KG/M2 | DIASTOLIC BLOOD PRESSURE: 70 MMHG | WEIGHT: 212 LBS | OXYGEN SATURATION: 99 % | HEART RATE: 56 BPM

## 2023-12-07 DIAGNOSIS — I35.0 NONRHEUMATIC AORTIC VALVE STENOSIS: Primary | ICD-10-CM

## 2023-12-07 DIAGNOSIS — I71.20 THORACIC AORTIC ANEURYSM WITHOUT RUPTURE, UNSPECIFIED PART: ICD-10-CM

## 2023-12-07 LAB
ANION GAP SERPL CALCULATED.3IONS-SCNC: 7 MMOL/L (ref 5–15)
BASOPHILS # BLD AUTO: 0.02 10*3/MM3 (ref 0–0.2)
BASOPHILS NFR BLD AUTO: 0.2 % (ref 0–1.5)
BUN SERPL-MCNC: 24 MG/DL (ref 8–23)
BUN/CREAT SERPL: 17.6 (ref 7–25)
CALCIUM SPEC-SCNC: 8.5 MG/DL (ref 8.6–10.5)
CHLORIDE SERPL-SCNC: 107 MMOL/L (ref 98–107)
CO2 SERPL-SCNC: 25 MMOL/L (ref 22–29)
CREAT SERPL-MCNC: 1.36 MG/DL (ref 0.76–1.27)
DEPRECATED RDW RBC AUTO: 44 FL (ref 37–54)
EGFRCR SERPLBLD CKD-EPI 2021: 58.8 ML/MIN/1.73
EOSINOPHIL # BLD AUTO: 0.17 10*3/MM3 (ref 0–0.4)
EOSINOPHIL NFR BLD AUTO: 2.1 % (ref 0.3–6.2)
ERYTHROCYTE [DISTWIDTH] IN BLOOD BY AUTOMATED COUNT: 13.3 % (ref 12.3–15.4)
GLUCOSE SERPL-MCNC: 103 MG/DL (ref 65–99)
HCT VFR BLD AUTO: 27.8 % (ref 37.5–51)
HGB BLD-MCNC: 8.6 G/DL (ref 13–17.7)
IMM GRANULOCYTES # BLD AUTO: 0.03 10*3/MM3 (ref 0–0.05)
IMM GRANULOCYTES NFR BLD AUTO: 0.4 % (ref 0–0.5)
LYMPHOCYTES # BLD AUTO: 1.03 10*3/MM3 (ref 0.7–3.1)
LYMPHOCYTES NFR BLD AUTO: 12.8 % (ref 19.6–45.3)
MCH RBC QN AUTO: 28.1 PG (ref 26.6–33)
MCHC RBC AUTO-ENTMCNC: 30.9 G/DL (ref 31.5–35.7)
MCV RBC AUTO: 90.8 FL (ref 79–97)
MONOCYTES # BLD AUTO: 0.72 10*3/MM3 (ref 0.1–0.9)
MONOCYTES NFR BLD AUTO: 9 % (ref 5–12)
NEUTROPHILS NFR BLD AUTO: 6.05 10*3/MM3 (ref 1.7–7)
NEUTROPHILS NFR BLD AUTO: 75.5 % (ref 42.7–76)
NRBC BLD AUTO-RTO: 0 /100 WBC (ref 0–0.2)
PLATELET # BLD AUTO: 161 10*3/MM3 (ref 140–450)
PMV BLD AUTO: 10.3 FL (ref 6–12)
POTASSIUM SERPL-SCNC: 4.5 MMOL/L (ref 3.5–5.2)
RBC # BLD AUTO: 3.06 10*6/MM3 (ref 4.14–5.8)
SODIUM SERPL-SCNC: 139 MMOL/L (ref 136–145)
WBC NRBC COR # BLD AUTO: 8.02 10*3/MM3 (ref 3.4–10.8)

## 2023-12-07 PROCEDURE — 36415 COLL VENOUS BLD VENIPUNCTURE: CPT

## 2023-12-07 PROCEDURE — 99024 POSTOP FOLLOW-UP VISIT: CPT | Performed by: NURSE PRACTITIONER

## 2023-12-07 PROCEDURE — 85025 COMPLETE CBC W/AUTO DIFF WBC: CPT

## 2023-12-07 PROCEDURE — 80048 BASIC METABOLIC PNL TOTAL CA: CPT

## 2023-12-14 ENCOUNTER — LAB (OUTPATIENT)
Dept: LAB | Facility: HOSPITAL | Age: 63
End: 2023-12-14
Payer: COMMERCIAL

## 2023-12-14 DIAGNOSIS — I35.0 NONRHEUMATIC AORTIC VALVE STENOSIS: ICD-10-CM

## 2023-12-14 LAB
ANION GAP SERPL CALCULATED.3IONS-SCNC: 6 MMOL/L (ref 5–15)
BASOPHILS # BLD AUTO: 0.03 10*3/MM3 (ref 0–0.2)
BASOPHILS NFR BLD AUTO: 0.3 % (ref 0–1.5)
BUN SERPL-MCNC: 21 MG/DL (ref 8–23)
BUN/CREAT SERPL: 15.4 (ref 7–25)
CALCIUM SPEC-SCNC: 8.5 MG/DL (ref 8.6–10.5)
CHLORIDE SERPL-SCNC: 108 MMOL/L (ref 98–107)
CO2 SERPL-SCNC: 28 MMOL/L (ref 22–29)
CREAT SERPL-MCNC: 1.36 MG/DL (ref 0.76–1.27)
DEPRECATED RDW RBC AUTO: 45.3 FL (ref 37–54)
EGFRCR SERPLBLD CKD-EPI 2021: 58.8 ML/MIN/1.73
EOSINOPHIL # BLD AUTO: 0.21 10*3/MM3 (ref 0–0.4)
EOSINOPHIL NFR BLD AUTO: 2.4 % (ref 0.3–6.2)
ERYTHROCYTE [DISTWIDTH] IN BLOOD BY AUTOMATED COUNT: 14 % (ref 12.3–15.4)
GLUCOSE SERPL-MCNC: 99 MG/DL (ref 65–99)
HCT VFR BLD AUTO: 27.6 % (ref 37.5–51)
HGB BLD-MCNC: 8.4 G/DL (ref 13–17.7)
IMM GRANULOCYTES # BLD AUTO: 0.03 10*3/MM3 (ref 0–0.05)
IMM GRANULOCYTES NFR BLD AUTO: 0.3 % (ref 0–0.5)
LYMPHOCYTES # BLD AUTO: 1.31 10*3/MM3 (ref 0.7–3.1)
LYMPHOCYTES NFR BLD AUTO: 14.8 % (ref 19.6–45.3)
MCH RBC QN AUTO: 27.4 PG (ref 26.6–33)
MCHC RBC AUTO-ENTMCNC: 30.4 G/DL (ref 31.5–35.7)
MCV RBC AUTO: 89.9 FL (ref 79–97)
MONOCYTES # BLD AUTO: 0.63 10*3/MM3 (ref 0.1–0.9)
MONOCYTES NFR BLD AUTO: 7.1 % (ref 5–12)
NEUTROPHILS NFR BLD AUTO: 6.67 10*3/MM3 (ref 1.7–7)
NEUTROPHILS NFR BLD AUTO: 75.1 % (ref 42.7–76)
NRBC BLD AUTO-RTO: 0 /100 WBC (ref 0–0.2)
PLATELET # BLD AUTO: 204 10*3/MM3 (ref 140–450)
PMV BLD AUTO: 10.5 FL (ref 6–12)
POTASSIUM SERPL-SCNC: 4.6 MMOL/L (ref 3.5–5.2)
RBC # BLD AUTO: 3.07 10*6/MM3 (ref 4.14–5.8)
SODIUM SERPL-SCNC: 142 MMOL/L (ref 136–145)
WBC NRBC COR # BLD AUTO: 8.88 10*3/MM3 (ref 3.4–10.8)

## 2023-12-14 PROCEDURE — 80048 BASIC METABOLIC PNL TOTAL CA: CPT

## 2023-12-14 PROCEDURE — 36415 COLL VENOUS BLD VENIPUNCTURE: CPT

## 2023-12-14 PROCEDURE — 85025 COMPLETE CBC W/AUTO DIFF WBC: CPT

## 2023-12-14 NOTE — PROGRESS NOTES
Please notify patient creatinine is still slightly elevated.  Make sure he is hydrating well with Gatorade.  If he has not yet seen his primary care provider, I recommend he schedule follow-up with him to monitor kidneys closely.  If he is not able to get into see them soon, let me know and we will plan on repeating labs in 1 more week.

## 2023-12-15 NOTE — PROGRESS NOTES
Called pt re: results. No answer. Left detailed message and requested he return call to confirm he got our message. Provided office phone number.

## 2024-01-02 ENCOUNTER — TELEPHONE (OUTPATIENT)
Dept: CARDIAC SURGERY | Facility: CLINIC | Age: 64
End: 2024-01-02

## 2024-01-02 NOTE — TELEPHONE ENCOUNTER
Silvia with [facility] calling re: CTA denial for this patient. He is scheduled for Echo and CT on 1/3 and FU with Dr Kumar on 1/4. The Echo was approved, but the CTA was not. They state since it is after surgery, patient would need new symptoms to warrant another CTA at this time. If you wish to do a P2P, this is the information:     Call [phone]   Order # [id number]

## 2024-01-03 ENCOUNTER — HOSPITAL ENCOUNTER (OUTPATIENT)
Dept: CT IMAGING | Facility: HOSPITAL | Age: 64
Discharge: HOME | End: 2024-01-03
Payer: COMMERCIAL

## 2024-01-03 ENCOUNTER — HOSPITAL ENCOUNTER (OUTPATIENT)
Dept: CARDIOLOGY | Facility: HOSPITAL | Age: 64
Discharge: HOME | End: 2024-01-03
Payer: COMMERCIAL

## 2024-01-03 VITALS
DIASTOLIC BLOOD PRESSURE: 70 MMHG | WEIGHT: 212 LBS | BODY MASS INDEX: 26.36 KG/M2 | SYSTOLIC BLOOD PRESSURE: 115 MMHG | HEIGHT: 75 IN

## 2024-01-03 DIAGNOSIS — I35.0 NONRHEUMATIC AORTIC VALVE STENOSIS: ICD-10-CM

## 2024-01-03 DIAGNOSIS — I71.20 THORACIC AORTIC ANEURYSM WITHOUT RUPTURE, UNSPECIFIED PART: ICD-10-CM

## 2024-01-03 LAB
BH CV ECHO LEFT VENTRICLE GLOBAL LONGITUDINAL STRAIN: -13.1 %
BH CV ECHO MEAS - AO MAX PG: 10.8 MMHG
BH CV ECHO MEAS - AO MEAN PG: 6 MMHG
BH CV ECHO MEAS - AO ROOT DIAM: 3 CM
BH CV ECHO MEAS - AO V2 MAX: 164 CM/SEC
BH CV ECHO MEAS - AO V2 VTI: 34 CM
BH CV ECHO MEAS - AVA(I,D): 2.43 CM2
BH CV ECHO MEAS - EDV(CUBED): 117.6 ML
BH CV ECHO MEAS - EDV(MOD-SP4): 131 ML
BH CV ECHO MEAS - EF(MOD-SP4): 67.4 %
BH CV ECHO MEAS - ESV(CUBED): 29.5 ML
BH CV ECHO MEAS - ESV(MOD-SP4): 42.7 ML
BH CV ECHO MEAS - FS: 36.9 %
BH CV ECHO MEAS - IVS/LVPW: 1.21 CM
BH CV ECHO MEAS - IVSD: 1.45 CM
BH CV ECHO MEAS - LA DIMENSION: 4.1 CM
BH CV ECHO MEAS - LAT PEAK E' VEL: 9.4 CM/SEC
BH CV ECHO MEAS - LV DIASTOLIC VOL/BSA (35-75): 58.2 CM2
BH CV ECHO MEAS - LV MASS(C)D: 260.8 GRAMS
BH CV ECHO MEAS - LV MAX PG: 4.4 MMHG
BH CV ECHO MEAS - LV MEAN PG: 2 MMHG
BH CV ECHO MEAS - LV SYSTOLIC VOL/BSA (12-30): 19 CM2
BH CV ECHO MEAS - LV V1 MAX: 105 CM/SEC
BH CV ECHO MEAS - LV V1 VTI: 21.7 CM
BH CV ECHO MEAS - LVIDD: 4.9 CM
BH CV ECHO MEAS - LVIDS: 3.1 CM
BH CV ECHO MEAS - LVOT AREA: 3.8 CM2
BH CV ECHO MEAS - LVOT DIAM: 2.2 CM
BH CV ECHO MEAS - LVPWD: 1.2 CM
BH CV ECHO MEAS - MED PEAK E' VEL: 7.8 CM/SEC
BH CV ECHO MEAS - MV A MAX VEL: 70.7 CM/SEC
BH CV ECHO MEAS - MV DEC TIME: 0.2 SEC
BH CV ECHO MEAS - MV E MAX VEL: 103 CM/SEC
BH CV ECHO MEAS - MV E/A: 1.46
BH CV ECHO MEAS - SI(MOD-SP4): 39.3 ML/M2
BH CV ECHO MEAS - SV(LVOT): 82.5 ML
BH CV ECHO MEAS - SV(MOD-SP4): 88.3 ML
BH CV ECHO MEAS - TAPSE (>1.6): 1.32 CM
BH CV ECHO MEAS - TR MAX VEL: 200 CM/SEC
BH CV ECHO MEASUREMENTS AVERAGE E/E' RATIO: 11.98
CREAT BLDA-MCNC: 1.4 MG/DL (ref 0.6–1.3)
LEFT ATRIUM VOLUME INDEX: 22 ML/M2
LEFT ATRIUM VOLUME: 49.6 ML

## 2024-01-03 PROCEDURE — 82565 ASSAY OF CREATININE: CPT

## 2024-01-03 PROCEDURE — 93306 TTE W/DOPPLER COMPLETE: CPT

## 2024-01-03 PROCEDURE — 71275 CT ANGIOGRAPHY CHEST: CPT

## 2024-01-03 PROCEDURE — 93356 MYOCRD STRAIN IMG SPCKL TRCK: CPT

## 2024-01-03 PROCEDURE — 25510000001 IOPAMIDOL PER 1 ML: Performed by: NURSE PRACTITIONER

## 2024-01-03 RX ADMIN — IOPAMIDOL 77 ML: 755 INJECTION, SOLUTION INTRAVENOUS at 08:06

## 2024-01-04 ENCOUNTER — OFFICE VISIT (OUTPATIENT)
Dept: CARDIAC SURGERY | Facility: CLINIC | Age: 64
End: 2024-01-04
Payer: COMMERCIAL

## 2024-01-04 VITALS
SYSTOLIC BLOOD PRESSURE: 126 MMHG | HEIGHT: 75 IN | DIASTOLIC BLOOD PRESSURE: 82 MMHG | BODY MASS INDEX: 25.49 KG/M2 | OXYGEN SATURATION: 98 % | WEIGHT: 205 LBS | HEART RATE: 53 BPM

## 2024-01-04 DIAGNOSIS — I71.20 THORACIC AORTIC ANEURYSM WITHOUT RUPTURE, UNSPECIFIED PART: ICD-10-CM

## 2024-01-04 DIAGNOSIS — I35.0 NONRHEUMATIC AORTIC VALVE STENOSIS: Primary | ICD-10-CM

## 2024-01-04 DIAGNOSIS — Q23.1 BICUSPID AORTIC VALVE: Primary | ICD-10-CM

## 2024-01-04 PROCEDURE — 99024 POSTOP FOLLOW-UP VISIT: CPT | Performed by: SURGERY

## 2024-01-04 RX ORDER — MULTIPLE VITAMINS W/ MINERALS TAB 9MG-400MCG
1 TAB ORAL DAILY
COMMUNITY

## 2024-01-04 NOTE — PROGRESS NOTES
"[facility] Cardiothoracic Surgery  PROGRESS NOTE          Procedure Performed: Aortic Root Replacement with BioBentall (34mm Dacron graft, 27mm Inspiris valve), Ascending Aortic Replacement with Hemiarch Replacement with 28mm Side Branched Dacron graft  Date of Procedure: 11/21/2023    Subjective:  Melecio Magallon is a 63-year-old male who presents for 6-week cardiac follow-up. He is accompanied by an adult female.    The patient reports he is feeling good. He denies any problems. His energy is good. He notes his first 4 weeks, he was tired when he walked up a hill, but the last 2 weeks have been good. The patient wonders if he still has a heart murmur and believes he needs hernia surgery.     He states he is seeing Dr. Cruz next week and confirms he is getting between 6000 steps a day and sometimes 8000 to 55080 steps a day. [place] was walking about 37058 steps [place]. He is not sure if he needs cardiac rehab.     Objective:      01/04/24  0805   Weight: 93 kg (205 lb)              PE:  Visit Vitals  /82 (BP Location: Right arm, Patient Position: Sitting, Cuff Size: Large Adult)   Pulse 53   Ht 190.5 cm (75\")   Wt 93 kg (205 lb)   SpO2 98%   BMI 25.62 kg/m²        No fevers, chills or recent illness.    GENERAL: NAD, resting comfortably, normal color  CARDIOVASCULAR: regular, regular rate, sinus, well-healed sternotomy with stable sternum  PULMONARY: Normal bilateral breath sounds, no labored breathing  ABDOMEN: soft, nontender/nondistended  EXTREMITIES: mild peripheral edema, normal pulses, normal ROM               Lab Results (last 72 hours)      ** No results found for the last 72 hours. **         CT Chest:   FINDINGS:     Lungs/Pleura: Small right pleural effusion with overlying passive  atelectasis. No pneumothorax.     Lower Neck: Unremarkable.     Mediastinum/Hilum: No enlarged lymphadenopathy. Relatively recent  postsurgical changes with a small amount of " intermediate density fluid  in the anterior mediastinum.     Heart/Aorta: Status post aortic root replacement, ascending aortic  replacement, as well as hemiarch replacement. Small amount of  intermediate density fluid surrounds the ascending aorta. New small  pericardial effusion. The repair appears patent. Great vessels enhance  normally. Mild coronary artery calcifications. No dissection.     Pulmonary Arteries: Pulmonary arteries are well-opacified to the  segmental level. No pulmonary embolism. Main pulmonary artery is normal  in caliber.     Chest wall/Soft Tissues: Unremarkable.     Upper Abdomen: No acute or suspicious findings.     Osseous Structures: No acute or suspicious osseous finding.     IMPRESSION:     Status post aortic root replacement, ascending aortic replacement, as  well as hemiarch replacement without visible complication. Small amount  of intermediate density fluid surrounds the ascending aorta as well as  within the anterior mediastinum with a small pericardial effusion,  probably within normal postoperative limits.     Small right pleural effusion with overlying passive atelectasis.     This report was signed and finalized on 1/3/2024 9:00 AM by Ramon Rosenthal.    ECHO:   Interpretation Summary         Left ventricular systolic function is normal. Left ventricular ejection fraction appears to be 61 - 65%.    Left ventricular wall thickness is consistent with mild concentric hypertrophy.    Left ventricular diastolic function was normal.    Estimated right ventricular systolic pressure from tricuspid regurgitation is normal (<35 mmHg).    There is a small (<1cm) pericardial effusion. There is no evidence of cardiac tamponade.    Abnormal global longitudinal LV strain (GLS) = -13.1%    There is a bioprosthetic aortic valve present. The aortic valve peak and mean gradients are within defined limits.    Aorttic root replacmenet noted with normal dimensions   Assessment/Plan         Mr. Magallon  is a 63-year-old male who had an ascending aortic aneurysm, aortic root aneurysm and bicuspid aortic valve. He is now 6 weeks status post bio ental with hemiarch replacement. He did very well after surgery. I'm very happy with his progress. He has healed well without any problems with healing. He did have some paroxysmal atrial fibrillation after surgery. He seems to be in sinus rhythm today as his rhythm feels regular on exam. He has not had follow-up with cardiology yet, but plans to make an appointment with Dr. Monteiro's office. I would recommend continuation of anticoagulation until confirmation of sinus rhythm for at least a 1 week Holter.     He has healed excellent. His sternum is stable. We discussed returning to normal activity on a progressive basis. He understands and agrees. Okay to return to driving. He plans to have inguinal hernia surgery soon, which I think is okay to proceed with and hold anticoagulation if needed for it. But would prefer to continue his aspirin. I have reviewed his imaging and his aortic repair appears excellent as well as no problems with his new prosthetic aortic valve.     Thank you for trusting me in the care of Mr. Magallon. I will plan on seeing him back in 1 year with a repeat echocardiogram. Please do not hesitate to call with questions or concerns.       Ramon Kumar MD   Cardiothoracic Surgeon     Transcribed from ambient dictation for Ramon Kumar MD by Sanford Dennis.  01/04/24   09:04 CST    Patient or patient representative verbalized consent to the visit recording.  I have personally performed the services described in this document as transcribed by the above individual, and it is both accurate and complete.    Answers submitted by the patient for this visit:  Primary Reason for Visit (Submitted on 12/28/2023)  What is the primary reason for your visit?: Other  Other (Submitted on 12/28/2023)  Please describe your symptoms.: This is a post op follow-up visit.  Have you  had these symptoms before?: No  How long have you been having these symptoms?: Greater than 2 weeks  Please list any medications you are currently taking for this condition.: N/A  Please describe any probable cause for these symptoms. : N/A

## 2024-01-11 ENCOUNTER — OFFICE VISIT (OUTPATIENT)
Dept: SURGERY | Facility: CLINIC | Age: 64
End: 2024-01-11
Payer: COMMERCIAL

## 2024-01-11 VITALS
WEIGHT: 200 LBS | BODY MASS INDEX: 24.87 KG/M2 | SYSTOLIC BLOOD PRESSURE: 128 MMHG | DIASTOLIC BLOOD PRESSURE: 80 MMHG | HEIGHT: 75 IN

## 2024-01-11 DIAGNOSIS — K40.90 RIGHT INGUINAL HERNIA: Primary | ICD-10-CM

## 2024-01-11 DIAGNOSIS — Z79.01 CHRONIC ANTICOAGULATION: ICD-10-CM

## 2024-01-11 RX ORDER — HEPARIN SODIUM 5000 [USP'U]/ML
5000 INJECTION, SOLUTION INTRAVENOUS; SUBCUTANEOUS ONCE
OUTPATIENT
Start: 2024-01-11 | End: 2024-01-11

## 2024-01-11 NOTE — PROGRESS NOTES
Office New Patient History and Physical:     Referring Provider: Johny Luis MD    Chief Complaint   Patient presents with    Hernia     Mr. Lora is here for evaluation of a right inguinal hernia.       Subjective .     History of present illness:  Melecio Magallon is a 63 y.o. male who presents with concern for a right inguinal hernia. He endorses a bulge in his right groin x 2-3 weeks. He can push it back in. He did have pain in the right groin in early November but [facility]. It is painful with lifting or activity.     BMI is 25. He is on Eliquis. He is a non-smoker. No prior surgeries on the abdomen.     History  Past Medical History:   Diagnosis Date    Aortic aneurysm     Bicuspid aortic valve     Cancer     skin cancer    Coronary artery disease 2018 / 2022    Aortic Stenosis / aneurysm    Glaucoma     Kidney stone Nov 2020    LVH (left ventricular hypertrophy) 10/29/2020    Mixed hyperlipidemia 10/29/2020    Nonrheumatic aortic valve insufficiency     Nonrheumatic aortic valve stenosis 04/13/2022    Primary hypertension 04/13/2022   ,   Past Surgical History:   Procedure Laterality Date    ANKLE SURGERY      placed joint back in place    ASCENDING ARCH/HEMIARCH REPLACEMENT N/A 11/21/2023    Procedure: AORTIC ROOT with 27mm INSPIRIS, ASCENDING AORTIC ARCH/HEMIARCH REPLACEMENT WITH CIRCULATORY ARREST, BRANDON;  Surgeon: Ramon Kumar MD;  Location: [facility] OR;  Service: Cardiothoracic;  Laterality: N/A;    CARDIAC CATHETERIZATION N/A 11/7/2023    Procedure: Left Heart Cath;  Surgeon: Raul Monteiro MD;  Location: [facility] CATH INVASIVE LOCATION;  Service: Cardiology;  Laterality: N/A;    COLONOSCOPY  06/17/2011    normal    COLONOSCOPY N/A 8/29/2023    Procedure: COLONOSCOPY WITH ANESTHESIA;  Surgeon: Chemo Williamson MD;  Location: [facility] ENDOSCOPY;  Service: Gastroenterology;  Laterality: N/A;  Pre: History of adenomatous polyp of colon;  Post: Polyp;  Toby  "Johny MACKAY MD    EXTRACORPOREAL SHOCK WAVE LITHOTRIPSY (ESWL) Right 11/30/2022    Procedure: RIGHT EXTRACORPOREAL SHOCKWAVE LITHOTRIPSY;  Surgeon: Venkat Murphy MD;  Location: [facility] OR;  Service: Urology;  Laterality: Right;    LITHOTRIPSY  November 2022   ,   Family History   Problem Relation Age of Onset    Cancer Father         Leukemia    Heart disease Maternal Grandfather     Cancer Paternal Grandfather         Lung Cancer    Colon cancer Neg Hx    ,   Social History     Tobacco Use    Smoking status: Never     Passive exposure: Never    Smokeless tobacco: Never   Vaping Use    Vaping Use: Never used   Substance Use Topics    Alcohol use: Yes     Comment: Occasional use of alcohol \"Rarely\"    Drug use: Never   , (Not in a hospital admission)   and Allergies:  Patient has no known allergies.    Current Outpatient Medications:     apixaban (ELIQUIS) 5 MG tablet tablet, Take 1 tablet by mouth 2 (Two) Times a Day., Disp: 60 tablet, Rfl: 2    aspirin 81 MG EC tablet, Take 1 tablet by mouth Daily., Disp: , Rfl:     bimatoprost (LUMIGAN) 0.01 % ophthalmic drops, Administer 1 drop to both eyes Every Night., Disp: , Rfl:     ezetimibe (ZETIA) 10 MG tablet, Take 1 tablet by mouth Daily., Disp: 30 tablet, Rfl: 11    Ferrous Sulfate (IRON PO), Take  by mouth., Disp: , Rfl:     metoprolol tartrate (LOPRESSOR) 25 MG tablet, Take 1 tablet by mouth Every 12 (Twelve) Hours., Disp: 60 tablet, Rfl: 2    multivitamin with minerals (MULTIVITAMIN MEN 50+ PO), Take 1 tablet by mouth Daily., Disp: , Rfl:     rosuvastatin (CRESTOR) 40 MG tablet, Take 1 tablet by mouth Daily., Disp: 30 tablet, Rfl: 11    timolol (TIMOPTIC) 0.5 % ophthalmic solution, Administer 1 drop to both eyes 2 (Two) Times a Day., Disp: , Rfl:     Objective     Vital Signs   /80   Ht 190.5 cm (75\")   Wt 90.7 kg (200 lb)   BMI 25.00 kg/m²      Physical Exam:  General appearance - alert, well appearing, and in no distress  Mental status - alert, oriented " to person, place, and time  Eyes - pupils equal and reactive, extraocular eye movements intact  Neck - supple, no significant adenopathy  Chest - no tachypnea, retractions or cyanosis  Heart - normal rate and regular rhythm  Abdomen - soft, nontender, nondistended, no masses or organomegaly  + right inguinal hernia, possible left inguinal hernia.   Neurological - alert, oriented, normal speech, no focal findings or movement disorder noted  Musculoskeletal - no joint tenderness, deformity or swelling    Results Review:     The following data was reviewed by: Lakeshia Cruz MD on 01/11/2024:  REFERRAL/PRE-AUTH MRN - SCAN - REF FROM [facility] [employer] (12/27/2023)     Assessment & Plan       Diagnoses and all orders for this visit:    1. Right inguinal hernia (Primary)  -     Case Request; Standing  -     MRSA Screen Culture (Outpatient) - Swab, Nares; Future  -     XR chest 1 vw; Future  -     ECG 12 Lead; Future  -     ceFAZolin (ANCEF) 2,000 mg in sodium chloride 0.9 % 100 mL IVPB  -     heparin (porcine) 5000 UNIT/ML injection 5,000 Units  -     CBC & Differential; Future  -     Comprehensive Metabolic Panel; Future  -     Case Request    2. Chronic anticoagulation    Other orders  -     Follow Anesthesia Guidelines / Protocol; Future  -     Follow Anesthesia Guidelines / Protocol; Standing  -     Verify / Perform Chlorhexidine Skin Prep; Standing  -     Verify / Perform Chlorhexidine Skin Prep if Indicated (If Not Already Completed); Standing  -     Obtain Informed Consent; Future  -     Provide NPO Instructions to Patient; Future  -     Chlorhexidine Skin Prep; Future  -     Notify physician (specify); Standing  -     Instructions on coughing, deep breathing, and incentive spirometry.; Standing  -     Oxygen Therapy-; Standing         Melecio Magallon is a 63 y.o. male with a right sided inguinal hernia without obstruction or gangrene. This is significantly limiting the patient's life-style due to discomfort, and  as such the patient wishes to proceed with repair. Risks of surgery including bleeding/hematoma, infection, mesh infection requiring removal, damage to surrounding structures (including the arteries, veins and nerves) with potential chronic post-operative pain, and hernia recurrence have been discussed with the patient. I also discussed the different operative approaches available for inguinal hernia repair and their respective risks and benefits including open repair, laparoscopic repair, and robotic repair. After this discussion, the patient and I have decided to proceed with a laparoscopic robotic assisted right inguinal hernia repair with mesh. The patient has an appointment for pre-operative CBC, CMP, EKG, CXR. We will ask Dr. Kumar for clearance to hold his Eliquis x 48 hours preop.  The patient is currently scheduled for the above operation on 2/13/24.     This is a chronic problem (pain x 3 months) with progression - development of a bulge. I have reviewed the note above. I have ordered CBC, CMP, CXR and EKG. He is at increased risk of perioperative complications 2/2 his chronic anticoagulation.     I also discussed with the patient post operative pain management including multimodal pain control utilizing Tylenol, ibuprofen, and Roxicodone for breakthrough pain. I will plan to give the patient 10 tabs of 5mg Roxicodone post operatively for breakthrough pain.    Lakeshia Cruz MD  01/11/24  20:35 CST

## 2024-01-11 NOTE — PATIENT INSTRUCTIONS
Surgery Scheduled: 02/13/2024; Arrive @ 6:00  Pre-work: 02/06 @ 10:00 (Eat and drink like normal on this day)  HOLD eliquis the day before and the day of surgery  NPO after midnight the night before surgery  Check in at the main registration for pre-work and surgery

## 2024-01-24 ENCOUNTER — OFFICE VISIT (OUTPATIENT)
Dept: CARDIOLOGY | Facility: CLINIC | Age: 64
End: 2024-01-24
Payer: COMMERCIAL

## 2024-01-24 VITALS
WEIGHT: 209 LBS | BODY MASS INDEX: 25.99 KG/M2 | HEIGHT: 75 IN | HEART RATE: 72 BPM | DIASTOLIC BLOOD PRESSURE: 83 MMHG | SYSTOLIC BLOOD PRESSURE: 138 MMHG

## 2024-01-24 DIAGNOSIS — E78.2 MIXED HYPERLIPIDEMIA: ICD-10-CM

## 2024-01-24 DIAGNOSIS — Q23.1 BICUSPID AORTIC VALVE: ICD-10-CM

## 2024-01-24 DIAGNOSIS — R09.89 BRUIT OF RIGHT CAROTID ARTERY: ICD-10-CM

## 2024-01-24 DIAGNOSIS — I48.0 PAF (PAROXYSMAL ATRIAL FIBRILLATION): ICD-10-CM

## 2024-01-24 DIAGNOSIS — I35.0 NONRHEUMATIC AORTIC VALVE STENOSIS: Primary | ICD-10-CM

## 2024-01-24 DIAGNOSIS — I10 PRIMARY HYPERTENSION: ICD-10-CM

## 2024-01-24 DIAGNOSIS — I51.7 LVH (LEFT VENTRICULAR HYPERTROPHY): ICD-10-CM

## 2024-01-24 RX ORDER — AMOXICILLIN 500 MG/1
500 CAPSULE ORAL SEE ADMIN INSTRUCTIONS
Qty: 4 CAPSULE | Refills: 1 | Status: SHIPPED | OUTPATIENT
Start: 2024-01-24

## 2024-01-24 NOTE — PROGRESS NOTES
Melecio Magallon  [id number]  1960  63 y.o.  male    Referring Provider: Johny Luis MD    Reason for  Visit:    short term office follow up after recent encounter   Initial visit to establish care with myself for ongoing longitudinal care related to cardiovascular issues   Bicuspid aortic valve with moderate aortic insufficiency  Was seen in [facility]   Cardiac workup test results as below: echo    Post op atrial fibrillation      Subjective    No new events or complaints since last visit   BP well controlled at home.       Overall feeling well   No chest pain or shortness of breath     No palpitations  No significant pedal edema    Compliant with medications and dietary advice  Good effort tolerance    No presyncope or syncope  Compliant with medications    Tolerating current medications well with no untoward side effects   Compliant with prescribed medication regimen. Tries to adhere to cardiac diet.    No bleeding, excessive bruising, gait instability or fall risks    Preoperative cardiovascular clearance under general anesthesia for right inguinal hernia Dr Cruz 2/13/2024    History of present illness:  Melecio Magallon is a 63 y.o. yo male with bicuspid aortic valve moderate to severe aortic stenosis  who presents today for   Chief Complaint   Patient presents with    Follow-up     Results    .    History  Past Medical History:   Diagnosis Date    Aneurysm     Aortic aneurysm     Aortic valve replaced Nov 21, 2023    Surgery Date    Atrial fibrillation     Bicuspid aortic valve     Cancer     skin cancer    Coronary artery disease 2018 / 2022    Aortic Stenosis / aneurysm    Glaucoma     Heart murmur     Kidney stone Nov 2020    LVH (left ventricular hypertrophy) 10/29/2020    Mixed hyperlipidemia 10/29/2020    Nonrheumatic aortic valve insufficiency     Nonrheumatic aortic valve stenosis 04/13/2022    Primary hypertension 04/13/2022   ,   Past Surgical History:   Procedure Laterality  Date    ANKLE SURGERY      placed joint back in place    AORTIC VALVE REPAIR/REPLACEMENT  11/21/2023    ARTERIAL ANEURYSM REPAIR  11/21/2023    ASCENDING ARCH/HEMIARCH REPLACEMENT N/A 11/21/2023    Procedure: AORTIC ROOT with 27mm INSPIRIS, ASCENDING AORTIC ARCH/HEMIARCH REPLACEMENT WITH CIRCULATORY ARREST, BRANDON;  Surgeon: Ramon Kumar MD;  Location: [facility] OR;  Service: Cardiothoracic;  Laterality: N/A;    CARDIAC CATHETERIZATION N/A 11/07/2023    Procedure: Left Heart Cath;  Surgeon: Raul Monteiro MD;  Location: [facility] CATH INVASIVE LOCATION;  Service: Cardiology;  Laterality: N/A;    CARDIAC VALVE REPLACEMENT      COLONOSCOPY  06/17/2011    normal    COLONOSCOPY N/A 08/29/2023    Procedure: COLONOSCOPY WITH ANESTHESIA;  Surgeon: Chemo Williamson MD;  Location: [facility] ENDOSCOPY;  Service: Gastroenterology;  Laterality: N/A;  Pre: History of adenomatous polyp of colon;  Post: Polyp;  Johny Luis MD    EXTRACORPOREAL SHOCK WAVE LITHOTRIPSY (ESWL) Right 11/30/2022    Procedure: RIGHT EXTRACORPOREAL SHOCKWAVE LITHOTRIPSY;  Surgeon: Venkat Murphy MD;  Location: [facility] OR;  Service: Urology;  Laterality: Right;    LITHOTRIPSY  November 2022   ,   Family History   Problem Relation Age of Onset    Cancer Father         Leukemia    Heart disease Maternal Grandfather     Cancer Paternal Grandfather         Lung Cancer    Heart attack Paternal Grandfather     Heart attack Maternal Uncle     Colon cancer Neg Hx    ,   Social History     Tobacco Use    Smoking status: Never     Passive exposure: Never    Smokeless tobacco: Never   Vaping Use    Vaping Use: Never used   Substance Use Topics    Alcohol use: Yes     Comment: Occasionally    Drug use: Never   ,     Medications  Current Outpatient Medications   Medication Sig Dispense Refill    apixaban (ELIQUIS) 5 MG tablet tablet Take 1 tablet by mouth 2 (Two) Times a Day. 60 tablet 2    aspirin 81 MG EC tablet Take 1 tablet by mouth Daily.      bimatoprost  "(LUMIGAN) 0.01 % ophthalmic drops Administer 1 drop to both eyes Every Night.      ezetimibe (ZETIA) 10 MG tablet Take 1 tablet by mouth Daily. 30 tablet 11    Ferrous Sulfate (IRON PO) Take  by mouth.      metoprolol tartrate (LOPRESSOR) 25 MG tablet Take 1 tablet by mouth Every 12 (Twelve) Hours. 60 tablet 2    multivitamin with minerals (MULTIVITAMIN MEN 50+ PO) Take 1 tablet by mouth Daily.      rosuvastatin (CRESTOR) 40 MG tablet Take 1 tablet by mouth Daily. 30 tablet 11    timolol (TIMOPTIC) 0.5 % ophthalmic solution Administer 1 drop to both eyes 2 (Two) Times a Day.      amoxicillin (AMOXIL) 500 MG capsule Take 1 capsule by mouth See Admin Instructions. 4 capsules 1 hour prior to procedure 4 capsule 1     No current facility-administered medications for this visit.       Allergies:  Patient has no known allergies.    Review of Systems  Review of Systems   Constitutional: Negative.   HENT: Negative.     Eyes: Negative.    Cardiovascular:  Negative for chest pain, claudication, cyanosis, dyspnea on exertion, irregular heartbeat, leg swelling, near-syncope, orthopnea, palpitations, paroxysmal nocturnal dyspnea and syncope.   Respiratory: Negative.     Endocrine: Negative.    Hematologic/Lymphatic: Negative.    Skin: Negative.    Gastrointestinal:  Negative for anorexia.   Genitourinary: Negative.    Neurological: Negative.    Psychiatric/Behavioral: Negative.         Objective     Physical Exam:  /83   Pulse 72   Ht 190.5 cm (75\")   Wt 94.8 kg (209 lb)   BMI 26.12 kg/m²     Physical Exam  Constitutional:       Appearance: He is well-developed.   HENT:      Head: Normocephalic.   Neck:      Vascular: Normal carotid pulses. Carotid bruit present. No JVD.      Trachea: No tracheal tenderness or tracheal deviation.   Cardiovascular:      Rate and Rhythm: Regular rhythm.      Pulses: Normal pulses.      Heart sounds: Murmur heard.      Systolic murmur is present with a grade of 2/6.      Diastolic murmur " is present with a grade of 2/4.   Pulmonary:      Effort: Pulmonary effort is normal.      Breath sounds: No stridor.   Abdominal:      General: There is no distension.      Palpations: Abdomen is soft.      Tenderness: There is no abdominal tenderness.   Musculoskeletal:      Cervical back: No edema.   Skin:     General: Skin is warm.   Neurological:      Mental Status: He is alert.      Cranial Nerves: No cranial nerve deficit.      Sensory: No sensory deficit.   Psychiatric:         Speech: Speech normal.         Behavior: Behavior normal.         Results Review:      Results for orders placed during the hospital encounter of 01/03/24    Adult Transthoracic Echo Complete W/ Cont if Necessary Per Protocol    Interpretation Summary    Left ventricular systolic function is normal. Left ventricular ejection fraction appears to be 61 - 65%.    Left ventricular wall thickness is consistent with mild concentric hypertrophy.    Left ventricular diastolic function was normal.    Estimated right ventricular systolic pressure from tricuspid regurgitation is normal (<35 mmHg).    There is a small (<1cm) pericardial effusion. There is no evidence of cardiac tamponade.    Abnormal global longitudinal LV strain (GLS) = -13.1%    There is a bioprosthetic aortic valve present. The aortic valve peak and mean gradients are within defined limits.    Aorttic root replacmenet noted with normal dimensions     CS Images     Radiology Images   Study Result    Narrative & Impression   High-resolution computed tomography imaging of the chest was performed  Particular attention paid to coronary arteries.  Images from the examination were analyzed for the presence and extent of coronary artery calcification using coronary calcification quantification software.  Patient tolerated the procedure well and there were no complications.  The results of coronary calcification analysis are as below.  The patient scores compared with published data  relating to scores for people of similar age and the same gender.     Left main: 0  Left anterior descending coronary artery: 12.4  Left circumflex: 2.1  Right coronary artery: 7.9  Total calcium score: 22.4     This represents a score that is at the 50th percentile for same age, gender and race/ethnicity.  Overall risk of cardiovascular events is considered to be LOW              ____________________________________________________________________________________________________________________________________________  Health maintenance and recommendations    Low salt/ HTN/ Heart healthy carbohydrate restricted cardiac diet   The patient is advised to reduce or avoid caffeine or other cardiac stimulants.   Minimize or avoid  NSAID-type medications      Monitor for any signs of bleeding including red or dark stools. Fall precautions.   Advised staying uptodate with immunizations per established standard guidelines.    Offered to give patient  a copy of my notes     Questions were encouraged, asked and answered to the patient's  understanding and satisfaction. Questions if any regarding current medications and side effects, need for refills and importance of compliance to medications stressed.    Reviewed available prior notes, consults, prior visits, laboratory findings, radiology and cardiology relevant reports. Updated chart as applicable. I have reviewed the patient's medical history in detail and updated the computerized patient record as relevant.      Updated patient regarding any new or relevant abnormalities on review of records or any new findings on physical exam. Mentioned to patient about purpose of visit and desirable health short and long term goals and objectives.    Primary to monitor CBC CMP Lipid panel and TSH as applicable    ___________________________________________________________________________________________________________________________________________       Procedures    Assessment &  Plan   Diagnoses and all orders for this visit:    1. Nonrheumatic aortic valve stenosis (Primary)    2. Mixed hyperlipidemia    3. LVH (left ventricular hypertrophy)    4. Bruit of right carotid artery    5. Bicuspid aortic valve    6. Primary hypertension    7. PAF (paroxysmal atrial fibrillation)  -     Holter Monitor - 72 Hour Up To 15 Days; Future    Other orders  -     amoxicillin (AMOXIL) 500 MG capsule; Take 1 capsule by mouth See Admin Instructions. 4 capsules 1 hour prior to procedure  Dispense: 4 capsule; Refill: 1        Plan    IE prophylaxis as going for dental cleaning next week   Requested Prescriptions     Signed Prescriptions Disp Refills    amoxicillin (AMOXIL) 500 MG capsule 4 capsule 1     Sig: Take 1 capsule by mouth See Admin Instructions. 4 capsules 1 hour prior to procedure      Patient expressed understanding  Encouraged and answered all questions   Discussed with the patient and all questioned fully answered. He will call me if any problems arise.   Discussed results of prior testing with patient : echo   as well electrocardiogram in chart reviewed     Orders Placed This Encounter   Procedures    Holter Monitor - 72 Hour Up To 15 Days     Standing Status:   Future     Standing Expiration Date:   1/24/2025     Order Specific Question:   Reason for exam?     Answer:   AFib     Order Specific Question:   How many days is the patient to wear the monitor?     Answer:   14     Order Specific Question:   Release to patient     Answer:   Routine Release [[id number]]      Call for results of above testing.     Check BP and heart rates twice daily initially till blood pressures and heart rates under good control and then at least 3x / week,   If blood pressures continue to be well-controlled then can check week a month  at home and bring a recording for review next visit  If BP >130/85 or < 100/60 persistently over 3 reading 30 mins apart or if heart rates persistently above 100 bpm or less than 55  bpm call sooner for evaluation and advise      Continue same medications     Acceptable cardiovascular risk of planned procedure.  Can proceed with surgery with usual caution and perioperative hemodynamic and cardiac rhythm monitoring.    OK to hold Eliquis x 2 days prior to procedure and resume when possible when no bleeding risks after procedure.      Patient and the doctor performing the procedure and requesting holding anticoagulation to be aware that   can get a stroke or other forms of cardioembolism when the patient  is off anticoagulation but willing to assume this risk       Implicit per requesting provider that the need and benefit of the recommended procedure outweighs the risk of anticoagulation cessation    No need for Lovenox    He has been cleared by Dr Kumar for inguinal hernia surgery   Monitor for any signs of bleeding including red or dark stools as well as easy bruisabilty. Fall precautions.      Keep LDL below 70 mg/dl. Monitor liver and renal functions.   Monitor CBC, CMP, TSH (as indicated) and Lipid Panel by primary     I support the patient's decision to take the Covid -19 vaccine   Had required complete course   No major issues   Now fully immunized       May need implantable loop recorder  for monitoring for atrial fibrillation  in future       Return in about 3 months (around 4/24/2024).

## 2024-02-06 ENCOUNTER — HOSPITAL ENCOUNTER (OUTPATIENT)
Dept: GENERAL RADIOLOGY | Facility: HOSPITAL | Age: 64
Discharge: HOME | End: 2024-02-06
Payer: COMMERCIAL

## 2024-02-06 ENCOUNTER — PRE-ADMISSION TESTING (OUTPATIENT)
Dept: PREADMISSION TESTING | Facility: HOSPITAL | Age: 64
End: 2024-02-06
Payer: COMMERCIAL

## 2024-02-06 VITALS
BODY MASS INDEX: 25.99 KG/M2 | HEART RATE: 61 BPM | WEIGHT: 209 LBS | OXYGEN SATURATION: 100 % | SYSTOLIC BLOOD PRESSURE: 129 MMHG | HEIGHT: 75 IN | RESPIRATION RATE: 14 BRPM | DIASTOLIC BLOOD PRESSURE: 83 MMHG

## 2024-02-06 DIAGNOSIS — K40.90 RIGHT INGUINAL HERNIA: ICD-10-CM

## 2024-02-06 LAB
ALBUMIN SERPL-MCNC: 4.4 G/DL (ref 3.5–5.2)
ALBUMIN/GLOB SERPL: 1.5 G/DL
ALP SERPL-CCNC: 69 U/L (ref 39–117)
ALT SERPL W P-5'-P-CCNC: 53 U/L (ref 1–41)
ANION GAP SERPL CALCULATED.3IONS-SCNC: 9 MMOL/L (ref 5–15)
AST SERPL-CCNC: 47 U/L (ref 1–40)
BASOPHILS # BLD AUTO: 0.03 10*3/MM3 (ref 0–0.2)
BASOPHILS NFR BLD AUTO: 0.4 % (ref 0–1.5)
BILIRUB SERPL-MCNC: 0.3 MG/DL (ref 0–1.2)
BUN SERPL-MCNC: 19 MG/DL (ref 8–23)
BUN/CREAT SERPL: 15.2 (ref 7–25)
CALCIUM SPEC-SCNC: 9.8 MG/DL (ref 8.6–10.5)
CHLORIDE SERPL-SCNC: 102 MMOL/L (ref 98–107)
CO2 SERPL-SCNC: 28 MMOL/L (ref 22–29)
CREAT SERPL-MCNC: 1.25 MG/DL (ref 0.76–1.27)
DEPRECATED RDW RBC AUTO: 44.9 FL (ref 37–54)
EGFRCR SERPLBLD CKD-EPI 2021: 65.1 ML/MIN/1.73
EOSINOPHIL # BLD AUTO: 0.13 10*3/MM3 (ref 0–0.4)
EOSINOPHIL NFR BLD AUTO: 1.9 % (ref 0.3–6.2)
ERYTHROCYTE [DISTWIDTH] IN BLOOD BY AUTOMATED COUNT: 14.3 % (ref 12.3–15.4)
GLOBULIN UR ELPH-MCNC: 2.9 GM/DL
GLUCOSE SERPL-MCNC: 99 MG/DL (ref 65–99)
HCT VFR BLD AUTO: 39.2 % (ref 37.5–51)
HGB BLD-MCNC: 12.4 G/DL (ref 13–17.7)
IMM GRANULOCYTES # BLD AUTO: 0.01 10*3/MM3 (ref 0–0.05)
IMM GRANULOCYTES NFR BLD AUTO: 0.1 % (ref 0–0.5)
LYMPHOCYTES # BLD AUTO: 1.37 10*3/MM3 (ref 0.7–3.1)
LYMPHOCYTES NFR BLD AUTO: 20.5 % (ref 19.6–45.3)
MCH RBC QN AUTO: 27.1 PG (ref 26.6–33)
MCHC RBC AUTO-ENTMCNC: 31.6 G/DL (ref 31.5–35.7)
MCV RBC AUTO: 85.8 FL (ref 79–97)
MONOCYTES # BLD AUTO: 0.53 10*3/MM3 (ref 0.1–0.9)
MONOCYTES NFR BLD AUTO: 7.9 % (ref 5–12)
NEUTROPHILS NFR BLD AUTO: 4.6 10*3/MM3 (ref 1.7–7)
NEUTROPHILS NFR BLD AUTO: 69.2 % (ref 42.7–76)
NRBC BLD AUTO-RTO: 0 /100 WBC (ref 0–0.2)
PLATELET # BLD AUTO: 198 10*3/MM3 (ref 140–450)
PMV BLD AUTO: 11.1 FL (ref 6–12)
POTASSIUM SERPL-SCNC: 5.1 MMOL/L (ref 3.5–5.2)
PROT SERPL-MCNC: 7.3 G/DL (ref 6–8.5)
RBC # BLD AUTO: 4.57 10*6/MM3 (ref 4.14–5.8)
SODIUM SERPL-SCNC: 139 MMOL/L (ref 136–145)
WBC NRBC COR # BLD AUTO: 6.67 10*3/MM3 (ref 3.4–10.8)

## 2024-02-06 PROCEDURE — 71045 X-RAY EXAM CHEST 1 VIEW: CPT

## 2024-02-06 PROCEDURE — 85025 COMPLETE CBC W/AUTO DIFF WBC: CPT

## 2024-02-06 PROCEDURE — 80053 COMPREHEN METABOLIC PANEL: CPT

## 2024-02-06 PROCEDURE — 36415 COLL VENOUS BLD VENIPUNCTURE: CPT

## 2024-02-06 PROCEDURE — 87081 CULTURE SCREEN ONLY: CPT

## 2024-02-06 PROCEDURE — 93005 ELECTROCARDIOGRAM TRACING: CPT

## 2024-02-06 RX ORDER — AMOXICILLIN 500 MG/1
500 CAPSULE ORAL TAKE AS DIRECTED
COMMUNITY

## 2024-02-06 NOTE — DISCHARGE INSTRUCTIONS
Before you come to the hospital        Arrival time: AS DIRECTED BY OFFICE     YOU MAY TAKE THE FOLLOWING MEDICATION(S) THE MORNING OF SURGERY WITH A SIP OF WATER:   METOPROLOL  FOLLOW ELIQUS INSTRUCTIONS FROM YOUR DOCTOR           ALL OTHER HOME MEDICATION CHECK WITH YOUR PHYSICIAN (especially if   you are taking diabetes medicines or blood thinners)    Do not take any Erectile Dysfunction medications (EX: CIALIS, VIAGRA) 24 hours prior to surgery.      If you were given and instructed to use a germ- killing soap, use as directed the night before surgery and again the morning of surgery or as directed by your surgeon. (Use one-half of the bottle with each shower.)   See attached information for How to Use Chlorhexidine for Bathing if applicable.            Eating and drinking restrictions prior to scheduled arrival time    2 Hours before arrival time STOP   Drinking Clear liquids (water, black coffee-NO CREAM,  apple juice-no pulp)    Clear Liquids    Water and flavored water                                                                      Clear Fruit juices, such as cranberry juice and apple juice.  Black coffee (NO cream of any kind, including powdered).  Plain tea  Clear bouillon or broth.  Flavored gelatin.  Soda.  Gatorade or Powerade.    8 Hours before arrival time STOP   All food, full liquids, and dairy products  Full liquid examples  Juices that have pulp.  Frozen ice pops that contain fruit pieces.  Coffee with creamer  Milk.  Yogurt.    (It is extremely important that you follow these guidelines to prevent delay or cancelation of your procedure)                       MANAGING PAIN AFTER SURGERY    We know you are probably wondering what your pain will be like after surgery.  Following surgery it is unrealistic to expect you will not have pain.   Pain is how our bodies let us know that something is wrong or cautions us to be careful.  That said, our goal is to make your pain tolerable.    Methods we  may use to treat your pain include (oral or IV medications, PCAs, epidurals, nerve blocks, etc.)   While some procedures require IV pain medications for a short time after surgery, transitioning to pain medications by mouth allows for better management of pain.   Your nurse will encourage you to take oral pain medications whenever possible.  IV medications work almost immediately, but only last a short while.  Taking medications by mouth allows for a more constant level of medication in your blood stream for a longer period of time.      Once your pain is out of control it is harder to get back under control.  It is important you are aware when your next dose of pain medication is due.  If you are admitted, your nurse may write the time of your next dose on the white board in your room to help you remember.      We are interested in your pain and encourage you to inform us about aggravating factors during your visit.   Many times a simple repositioning every few hours can make a big difference.    If your physician says it is okay, do not let your pain prevent you from getting out of bed. Be sure to call your nurse for assistance prior to getting up so you do not fall.      Before surgery, please decide your tolerable pain goal.  These faces help describe the pain ratings we use on a 0-10 scale.   Be prepared to tell us your goal and whether or not you take pain or anxiety medications at home.          Preparing for Surgery  Preparing for surgery is an important part of your care. It can make things go more smoothly and help you avoid complications. The steps leading up to surgery may vary among hospitals. Follow all instructions given to you by your health care providers. Ask questions if you do not understand something. Talk about any concerns that you have.  Here are some questions to consider asking before your surgery:  If my surgery is not an emergency (is elective), when would be the best time to have the  surgery?  What arrangements do I need to make for work, home, or school?  What will my recovery be like? How long will it be before I can return to normal activities?  Will I need to prepare my home? Will I need to arrange care for me or my children?  Should I expect to have pain after surgery? What are my pain management options? Are there nonmedical options that I can try for pain?  Tell a health care provider about:  Any allergies you have.  All medicines you are taking, including vitamins, herbs, eye drops, creams, and over-the-counter medicines.  Any problems you or family members have had with anesthetic medicines.  Any blood disorders you have.  Any surgeries you have had.  Any medical conditions you have.  Whether you are pregnant or may be pregnant.  What are the risks?  The risks and complications of surgery depend on the specific procedure that you have. Discuss all the risks with your health care providers before your surgery. Ask about common surgical complications, which may include:  Infection.  Bleeding or a need for blood replacement (transfusion).  Allergic reactions to medicines.  Damage to surrounding nerves, tissues, or structures.  A blood clot.  Scarring.  Failure of the surgery to correct the problem.  Follow these instructions before the procedure:  Several days or weeks before your procedure  You may have a physical exam by your primary health care provider to make sure it is safe for you to have surgery.  You may have testing. This may include a chest X-ray, blood and urine tests, electrocardiogram (ECG), or other testing.  Ask your health care provider about:  Changing or stopping your regular medicines. This is especially important if you are taking diabetes medicines or blood thinners.  Taking medicines such as aspirin and ibuprofen. These medicines can thin your blood. Do not take these medicines unless your health care provider tells you to take them.  Taking over-the-counter  medicines, vitamins, herbs, and supplements.  Do not use any products that contain nicotine or tobacco, such as cigarettes and e-cigarettes. If you need help quitting, ask your health care provider.  Avoid alcohol.  Ask your health care provider if there are exercises you can do to prepare for surgery.  Eat a healthy diet.   Plan to have someone 18 years of age or older to take you home from the hospital. We will need to verify your ride on the morning of surgery if you are being discharged home on the same day. Tell your ride to be expecting a call from the hospital prior to your procedure.   Plan to have a responsible adult care for you for at least 24 hours after you leave the hospital or clinic. This is important.  The day before your procedure  You may be given antibiotic medicine to take by mouth to help prevent infection. Take it as told by your health care provider.  You may be asked to shower with a germ-killing soap.  Follow instructions from your health care provider about eating and drinking restrictions. This includes gum, mints and hard candy.  Pack comfortable clothes according to your procedure.   The day of your procedure  You may need to take another shower with a germ-killing soap before you leave home in the morning.  With a small sip of water, take only the medicines that you are told to take.  Remove all jewelry including rings.   Leave anything you consider valuable at home except hearing aids if needed.  You do not need to bring your home medications into the hospital.   Do not wear any makeup, nail polish, powder, deodorant, lotion, hair accessories, or anything on your skin or body except your clothes.  If you will be staying in the hospital, bring a case to hold your glasses, contacts, or dentures. You may also want to bring your robe and non-skid footwear.       (Do not use denture adhesives since you will be asked to remove them during  surgery).   If you wear oxygen at home, bring it  with you the day of surgery.  If instructed by your health care provider, bring your sleep apnea device with you on the day of your surgery (if this applies to you).  You may want to leave your suitcase and sleep apnea device in the car until after surgery.   Arrive at the hospital as scheduled.  Bring a friend or family member with you who can help to answer questions and be present while you meet with your health care provider.  At the hospital  When you arrive at the hospital:  Go to registration located at the main entrance of the hospital. You will be registered and given a beeper and a sticker sheet. Take the stickers to the Outpatient nurses desk and place in the black tray. This is to notify staff that you have arrived. Then return to the lobby to wait.   When your beeper lights up and vibrates proceed through the double doors, under the stairs, and a member of the Outpatient Surgery staff will escort you to your preoperative room.  You may have to wear compression sleeves. These help to prevent blood clots and reduce swelling in your legs.  An IV may be inserted into one of your veins.              In the operating room, you may be given one or more of the following:        A medicine to help you relax (sedative).        A medicine to numb the area (local anesthetic).        A medicine to make you fall asleep (general anesthetic).        A medicine that is injected into an area of your body to numb everything below the                      injection site (regional anesthetic).  You may be given an antibiotic through your IV to help prevent infection.  Your surgical site will be marked or identified.    Contact a health care provider if you:  Develop a fever of more than 100.4°F (38°C) or other feelings of illness during the 48 hours before your surgery.  Have symptoms that get worse.  Have questions or concerns about your surgery.  Summary  Preparing for surgery can make the procedure go more smoothly and  lower your risk of complications.  Before surgery, make a list of questions and concerns to discuss with your surgeon. Ask about the risks and possible complications.  In the days or weeks before your surgery, follow all instructions from your health care provider. You may need to stop smoking, avoid alcohol, follow eating restrictions, and change or stop your regular medicines.  Contact your surgeon if you develop a fever or other signs of illness during the few days before your surgery.  This information is not intended to replace advice given to you by your health care provider. Make sure you discuss any questions you have with your health care provider.  Document Revised: 12/21/2018 Document Reviewed: 10/23/2018  [employer] Patient Education © 2021 [employer]         How to Use Chlorhexidine Before Surgery  Chlorhexidine gluconate (CHG) is a germ-killing (antiseptic) solution that is used to clean the skin. It can get rid of the bacteria that normally live on the skin and can keep them away for about 24 hours. To clean your skin with CHG, you may be given:  A CHG solution to use in the shower or as part of a sponge bath.  A prepackaged cloth that contains CHG.  Cleaning your skin with CHG may help lower the risk for infection:  While you are staying in the intensive care unit of the hospital.  If you have a vascular access, such as a central line, to provide short-term or long-term access to your veins.  If you have a catheter to drain urine from your bladder.  If you are on a ventilator. A ventilator is a machine that helps you breathe by moving air in and out of your lungs.  After surgery.  What are the risks?  Risks of using CHG include:  A skin reaction.  Hearing loss, if CHG gets in your ears and you have a perforated eardrum.  Eye injury, if CHG gets in your eyes and is not rinsed out.  The CHG product catching fire.  Make sure that you avoid smoking and flames after applying CHG to your skin.  Do not use  CHG:  If you have a chlorhexidine allergy or have previously reacted to chlorhexidine.  On babies younger than 2 months of age.  How to use CHG solution  Use CHG only as told by your health care provider, and follow the instructions on the label.  Use the full amount of CHG as directed. Usually, this is one bottle.  During a shower    Follow these steps when using CHG solution during a shower (unless your health care provider gives you different instructions):  Start the shower.  Use your normal soap and shampoo to wash your face and hair.  Turn off the shower or move out of the shower stream.  Pour the CHG onto a clean washcloth. Do not use any type of brush or rough-edged sponge.  Starting at your neck, lather your body down to your toes. Make sure you follow these instructions:  If you will be having surgery, pay special attention to the part of your body where you will be having surgery. Scrub this area for at least 1 minute.  Do not use CHG on your head or face. If the solution gets into your ears or eyes, rinse them well with water.  Avoid your genital area.  Avoid any areas of skin that have broken skin, cuts, or scrapes.  Scrub your back and under your arms. Make sure to wash skin folds.  Let the lather sit on your skin for 1-2 minutes or as long as told by your health care provider.  Thoroughly rinse your entire body in the shower. Make sure that all body creases and crevices are rinsed well.  Dry off with a clean towel. Do not put any substances on your body afterward--such as powder, lotion, or perfume--unless you are told to do so by your health care provider. Only use lotions that are recommended by the .  Put on clean clothes or pajamas.  If it is the night before your surgery, sleep in clean sheets.     During a sponge bath  Follow these steps when using CHG solution during a sponge bath (unless your health care provider gives you different instructions):  Use your normal soap and shampoo  to wash your face and hair.  Pour the CHG onto a clean washcloth.  Starting at your neck, lather your body down to your toes. Make sure you follow these instructions:  If you will be having surgery, pay special attention to the part of your body where you will be having surgery. Scrub this area for at least 1 minute.  Do not use CHG on your head or face. If the solution gets into your ears or eyes, rinse them well with water.  Avoid your genital area.  Avoid any areas of skin that have broken skin, cuts, or scrapes.  Scrub your back and under your arms. Make sure to wash skin folds.  Let the lather sit on your skin for 1-2 minutes or as long as told by your health care provider.  Using a different clean, wet washcloth, thoroughly rinse your entire body. Make sure that all body creases and crevices are rinsed well.  Dry off with a clean towel. Do not put any substances on your body afterward--such as powder, lotion, or perfume--unless you are told to do so by your health care provider. Only use lotions that are recommended by the .  Put on clean clothes or pajamas.  If it is the night before your surgery, sleep in clean sheets.  How to use CHG prepackaged cloths  Only use CHG cloths as told by your health care provider, and follow the instructions on the label.  Use the CHG cloth on clean, dry skin.  Do not use the CHG cloth on your head or face unless your health care provider tells you to.  When washing with the CHG cloth:  Avoid your genital area.  Avoid any areas of skin that have broken skin, cuts, or scrapes.  Before surgery    Follow these steps when using a CHG cloth to clean before surgery (unless your health care provider gives you different instructions):  Using the CHG cloth, vigorously scrub the part of your body where you will be having surgery. Scrub using a back-and-forth motion for 3 minutes. The area on your body should be completely wet with CHG when you are done scrubbing.  Do not  rinse. Discard the cloth and let the area air-dry. Do not put any substances on the area afterward, such as powder, lotion, or perfume.  Put on clean clothes or pajamas.  If it is the night before your surgery, sleep in clean sheets.     For general bathing  Follow these steps when using CHG cloths for general bathing (unless your health care provider gives you different instructions).  Use a separate CHG cloth for each area of your body. Make sure you wash between any folds of skin and between your fingers and toes. Wash your body in the following order, switching to a new cloth after each step:  The front of your neck, shoulders, and chest.  Both of your arms, under your arms, and your hands.  Your stomach and groin area, avoiding the genitals.  Your right leg and foot.  Your left leg and foot.  The back of your neck, your back, and your buttocks.  Do not rinse. Discard the cloth and let the area air-dry. Do not put any substances on your body afterward--such as powder, lotion, or perfume--unless you are told to do so by your health care provider. Only use lotions that are recommended by the .  Put on clean clothes or pajamas.  Contact a health care provider if:  Your skin gets irritated after scrubbing.  You have questions about using your solution or cloth.  You swallow any chlorhexidine. Call your local poison control center (1-569.108.6477 in the [place]  Get help right away if:  Your eyes itch badly, or they become very red or swollen.  Your skin itches badly and is red or swollen.  Your hearing changes.  You have trouble seeing.  You have swelling or tingling in your mouth or throat.  You have trouble breathing.  These symptoms may represent a serious problem that is an emergency. Do not wait to see if the symptoms will go away. Get medical help right away. Call your local emergency services (989 in the [place] Do not drive yourself to the hospital.  Summary  Chlorhexidine gluconate (CHG) is a  germ-killing (antiseptic) solution that is used to clean the skin. Cleaning your skin with CHG may help to lower your risk for infection.  You may be given CHG to use for bathing. It may be in a bottle or in a prepackaged cloth to use on your skin. Carefully follow your health care provider's instructions and the instructions on the product label.  Do not use CHG if you have a chlorhexidine allergy.  Contact your health care provider if your skin gets irritated after scrubbing.  This information is not intended to replace advice given to you by your health care provider. Make sure you discuss any questions you have with your health care provider.  Document Revised: 04/17/2023 Document Reviewed: 02/28/2022  [employer] Patient Education © 2023 [employer]

## 2024-02-07 LAB — MRSA SPEC QL CULT: NORMAL

## 2024-02-08 LAB
QT INTERVAL: 396 MS
QTC INTERVAL: 396 MS

## 2024-02-13 ENCOUNTER — ANESTHESIA (OUTPATIENT)
Dept: PERIOP | Facility: HOSPITAL | Age: 64
End: 2024-02-13
Payer: COMMERCIAL

## 2024-02-13 ENCOUNTER — ANESTHESIA EVENT (OUTPATIENT)
Dept: PERIOP | Facility: HOSPITAL | Age: 64
End: 2024-02-13
Payer: COMMERCIAL

## 2024-02-13 ENCOUNTER — HOSPITAL ENCOUNTER (OUTPATIENT)
Facility: HOSPITAL | Age: 64
Setting detail: HOSPITAL OUTPATIENT SURGERY
Discharge: HOME | End: 2024-02-13
Attending: STUDENT IN AN ORGANIZED HEALTH CARE EDUCATION/TRAINING PROGRAM | Admitting: STUDENT IN AN ORGANIZED HEALTH CARE EDUCATION/TRAINING PROGRAM
Payer: COMMERCIAL

## 2024-02-13 VITALS
OXYGEN SATURATION: 100 % | SYSTOLIC BLOOD PRESSURE: 119 MMHG | TEMPERATURE: 97.3 F | RESPIRATION RATE: 16 BRPM | HEART RATE: 60 BPM | DIASTOLIC BLOOD PRESSURE: 80 MMHG

## 2024-02-13 DIAGNOSIS — K40.90 RIGHT INGUINAL HERNIA: ICD-10-CM

## 2024-02-13 PROCEDURE — C1781 MESH (IMPLANTABLE): HCPCS | Performed by: STUDENT IN AN ORGANIZED HEALTH CARE EDUCATION/TRAINING PROGRAM

## 2024-02-13 PROCEDURE — 25010000002 LIDOCAINE 1 % SOLUTION 20 ML VIAL: Performed by: STUDENT IN AN ORGANIZED HEALTH CARE EDUCATION/TRAINING PROGRAM

## 2024-02-13 PROCEDURE — 25810000003 SODIUM CHLORIDE PER 500 ML: Performed by: STUDENT IN AN ORGANIZED HEALTH CARE EDUCATION/TRAINING PROGRAM

## 2024-02-13 PROCEDURE — 25010000002 BUPIVACAINE 0.5 % SOLUTION 50 ML VIAL: Performed by: STUDENT IN AN ORGANIZED HEALTH CARE EDUCATION/TRAINING PROGRAM

## 2024-02-13 PROCEDURE — 25810000003 LACTATED RINGERS PER 1000 ML: Performed by: STUDENT IN AN ORGANIZED HEALTH CARE EDUCATION/TRAINING PROGRAM

## 2024-02-13 PROCEDURE — 25010000002 CEFAZOLIN PER 500 MG: Performed by: STUDENT IN AN ORGANIZED HEALTH CARE EDUCATION/TRAINING PROGRAM

## 2024-02-13 PROCEDURE — 25010000002 BUPIVACAINE (PF) 0.25 % SOLUTION 30 ML VIAL: Performed by: STUDENT IN AN ORGANIZED HEALTH CARE EDUCATION/TRAINING PROGRAM

## 2024-02-13 PROCEDURE — 25010000002 DEXAMETHASONE PER 1 MG: Performed by: STUDENT IN AN ORGANIZED HEALTH CARE EDUCATION/TRAINING PROGRAM

## 2024-02-13 PROCEDURE — 25010000002 PROPOFOL 10 MG/ML EMULSION: Performed by: NURSE ANESTHETIST, CERTIFIED REGISTERED

## 2024-02-13 PROCEDURE — S0260 H&P FOR SURGERY: HCPCS | Performed by: STUDENT IN AN ORGANIZED HEALTH CARE EDUCATION/TRAINING PROGRAM

## 2024-02-13 PROCEDURE — 25010000002 MORPHINE SULFATE (PF) 2 MG/ML SOLUTION 1 ML CARTRIDGE: Performed by: STUDENT IN AN ORGANIZED HEALTH CARE EDUCATION/TRAINING PROGRAM

## 2024-02-13 PROCEDURE — 25010000002 ONDANSETRON PER 1 MG: Performed by: NURSE ANESTHETIST, CERTIFIED REGISTERED

## 2024-02-13 PROCEDURE — 25010000002 FENTANYL CITRATE (PF) 250 MCG/5ML SOLUTION: Performed by: NURSE ANESTHETIST, CERTIFIED REGISTERED

## 2024-02-13 PROCEDURE — 25010000002 DEXAMETHASONE PER 1 MG: Performed by: NURSE ANESTHETIST, CERTIFIED REGISTERED

## 2024-02-13 PROCEDURE — 25010000002 GLYCOPYRROLATE 0.4 MG/2ML SOLUTION: Performed by: NURSE ANESTHETIST, CERTIFIED REGISTERED

## 2024-02-13 PROCEDURE — 25010000002 HEPARIN (PORCINE) PER 1000 UNITS: Performed by: STUDENT IN AN ORGANIZED HEALTH CARE EDUCATION/TRAINING PROGRAM

## 2024-02-13 PROCEDURE — 49650 LAP ING HERNIA REPAIR INIT: CPT | Performed by: STUDENT IN AN ORGANIZED HEALTH CARE EDUCATION/TRAINING PROGRAM

## 2024-02-13 DEVICE — DEV CONTRL TISS STRATAFIX SPIRAL MNCRYL PLS SH 3/0 9IN UD: Type: IMPLANTABLE DEVICE | Site: ABDOMEN | Status: FUNCTIONAL

## 2024-02-13 DEVICE — IMPLANTABLE DEVICE: Type: IMPLANTABLE DEVICE | Site: ABDOMEN | Status: FUNCTIONAL

## 2024-02-13 RX ORDER — HYDROCODONE BITARTRATE AND ACETAMINOPHEN 10; 325 MG/1; MG/1
1 TABLET ORAL EVERY 4 HOURS PRN
Status: DISCONTINUED | OUTPATIENT
Start: 2024-02-13 | End: 2024-02-13 | Stop reason: HOSPADM

## 2024-02-13 RX ORDER — NALOXONE HCL 0.4 MG/ML
0.4 VIAL (ML) INJECTION AS NEEDED
Status: DISCONTINUED | OUTPATIENT
Start: 2024-02-13 | End: 2024-02-13 | Stop reason: HOSPADM

## 2024-02-13 RX ORDER — DEXAMETHASONE SODIUM PHOSPHATE 4 MG/ML
INJECTION, SOLUTION INTRA-ARTICULAR; INTRALESIONAL; INTRAMUSCULAR; INTRAVENOUS; SOFT TISSUE AS NEEDED
Status: DISCONTINUED | OUTPATIENT
Start: 2024-02-13 | End: 2024-02-13 | Stop reason: SURG

## 2024-02-13 RX ORDER — FENTANYL CITRATE 50 UG/ML
INJECTION, SOLUTION INTRAMUSCULAR; INTRAVENOUS AS NEEDED
Status: DISCONTINUED | OUTPATIENT
Start: 2024-02-13 | End: 2024-02-13 | Stop reason: SURG

## 2024-02-13 RX ORDER — ACETAMINOPHEN 500 MG
1000 TABLET ORAL ONCE
Status: COMPLETED | OUTPATIENT
Start: 2024-02-13 | End: 2024-02-13

## 2024-02-13 RX ORDER — MIDAZOLAM HYDROCHLORIDE 1 MG/ML
1 INJECTION INTRAMUSCULAR; INTRAVENOUS
Status: DISCONTINUED | OUTPATIENT
Start: 2024-02-13 | End: 2024-02-13 | Stop reason: HOSPADM

## 2024-02-13 RX ORDER — OXYCODONE HYDROCHLORIDE 5 MG/1
5 TABLET ORAL EVERY 8 HOURS PRN
Qty: 10 TABLET | Refills: 0 | Status: SHIPPED | OUTPATIENT
Start: 2024-02-13 | End: 2025-02-12

## 2024-02-13 RX ORDER — EPHEDRINE SULFATE 50 MG/ML
INJECTION INTRAVENOUS AS NEEDED
Status: DISCONTINUED | OUTPATIENT
Start: 2024-02-13 | End: 2024-02-13 | Stop reason: SURG

## 2024-02-13 RX ORDER — HEPARIN SODIUM 5000 [USP'U]/ML
5000 INJECTION, SOLUTION INTRAVENOUS; SUBCUTANEOUS ONCE
Status: COMPLETED | OUTPATIENT
Start: 2024-02-13 | End: 2024-02-13

## 2024-02-13 RX ORDER — SODIUM CHLORIDE, SODIUM LACTATE, POTASSIUM CHLORIDE, CALCIUM CHLORIDE 600; 310; 30; 20 MG/100ML; MG/100ML; MG/100ML; MG/100ML
1000 INJECTION, SOLUTION INTRAVENOUS CONTINUOUS
Status: DISCONTINUED | OUTPATIENT
Start: 2024-02-13 | End: 2024-02-13 | Stop reason: HOSPADM

## 2024-02-13 RX ORDER — SODIUM CHLORIDE, SODIUM LACTATE, POTASSIUM CHLORIDE, CALCIUM CHLORIDE 600; 310; 30; 20 MG/100ML; MG/100ML; MG/100ML; MG/100ML
100 INJECTION, SOLUTION INTRAVENOUS CONTINUOUS
Status: DISCONTINUED | OUTPATIENT
Start: 2024-02-13 | End: 2024-02-13 | Stop reason: HOSPADM

## 2024-02-13 RX ORDER — ONDANSETRON 2 MG/ML
INJECTION INTRAMUSCULAR; INTRAVENOUS AS NEEDED
Status: DISCONTINUED | OUTPATIENT
Start: 2024-02-13 | End: 2024-02-13 | Stop reason: SURG

## 2024-02-13 RX ORDER — DROPERIDOL 2.5 MG/ML
0.62 INJECTION, SOLUTION INTRAMUSCULAR; INTRAVENOUS ONCE AS NEEDED
Status: DISCONTINUED | OUTPATIENT
Start: 2024-02-13 | End: 2024-02-13 | Stop reason: HOSPADM

## 2024-02-13 RX ORDER — SODIUM CHLORIDE 0.9 % (FLUSH) 0.9 %
3 SYRINGE (ML) INJECTION EVERY 12 HOURS SCHEDULED
Status: DISCONTINUED | OUTPATIENT
Start: 2024-02-13 | End: 2024-02-13 | Stop reason: HOSPADM

## 2024-02-13 RX ORDER — SODIUM CHLORIDE 0.9 % (FLUSH) 0.9 %
3-10 SYRINGE (ML) INJECTION AS NEEDED
Status: DISCONTINUED | OUTPATIENT
Start: 2024-02-13 | End: 2024-02-13 | Stop reason: HOSPADM

## 2024-02-13 RX ORDER — SODIUM CHLORIDE 9 MG/ML
INJECTION, SOLUTION INTRAVENOUS AS NEEDED
Status: DISCONTINUED | OUTPATIENT
Start: 2024-02-13 | End: 2024-02-13 | Stop reason: HOSPADM

## 2024-02-13 RX ORDER — HYDROCODONE BITARTRATE AND ACETAMINOPHEN 5; 325 MG/1; MG/1
1 TABLET ORAL ONCE AS NEEDED
Status: DISCONTINUED | OUTPATIENT
Start: 2024-02-13 | End: 2024-02-13 | Stop reason: HOSPADM

## 2024-02-13 RX ORDER — BUPIVACAINE HCL/0.9 % NACL/PF 0.125 %
PLASTIC BAG, INJECTION (ML) EPIDURAL AS NEEDED
Status: DISCONTINUED | OUTPATIENT
Start: 2024-02-13 | End: 2024-02-13 | Stop reason: SURG

## 2024-02-13 RX ORDER — PROPOFOL 10 MG/ML
VIAL (ML) INTRAVENOUS AS NEEDED
Status: DISCONTINUED | OUTPATIENT
Start: 2024-02-13 | End: 2024-02-13 | Stop reason: SURG

## 2024-02-13 RX ORDER — FLUMAZENIL 0.1 MG/ML
0.2 INJECTION INTRAVENOUS AS NEEDED
Status: DISCONTINUED | OUTPATIENT
Start: 2024-02-13 | End: 2024-02-13 | Stop reason: HOSPADM

## 2024-02-13 RX ORDER — ACETAMINOPHEN 325 MG/1
975 TABLET ORAL EVERY 8 HOURS
Start: 2024-02-13 | End: 2025-02-12

## 2024-02-13 RX ORDER — IBUPROFEN 600 MG/1
600 TABLET ORAL ONCE AS NEEDED
Status: DISCONTINUED | OUTPATIENT
Start: 2024-02-13 | End: 2024-02-13 | Stop reason: HOSPADM

## 2024-02-13 RX ORDER — ONDANSETRON 4 MG/1
4 TABLET, FILM COATED ORAL EVERY 8 HOURS PRN
Qty: 15 TABLET | Refills: 0 | Status: SHIPPED | OUTPATIENT
Start: 2024-02-13 | End: 2025-02-12

## 2024-02-13 RX ORDER — SODIUM CHLORIDE 0.9 % (FLUSH) 0.9 %
10 SYRINGE (ML) INJECTION AS NEEDED
Status: DISCONTINUED | OUTPATIENT
Start: 2024-02-13 | End: 2024-02-13 | Stop reason: HOSPADM

## 2024-02-13 RX ORDER — SODIUM CHLORIDE 9 MG/ML
40 INJECTION, SOLUTION INTRAVENOUS AS NEEDED
Status: DISCONTINUED | OUTPATIENT
Start: 2024-02-13 | End: 2024-02-13 | Stop reason: HOSPADM

## 2024-02-13 RX ORDER — LABETALOL HYDROCHLORIDE 5 MG/ML
5 INJECTION, SOLUTION INTRAVENOUS
Status: DISCONTINUED | OUTPATIENT
Start: 2024-02-13 | End: 2024-02-13 | Stop reason: HOSPADM

## 2024-02-13 RX ORDER — ONDANSETRON 2 MG/ML
4 INJECTION INTRAMUSCULAR; INTRAVENOUS ONCE AS NEEDED
Status: DISCONTINUED | OUTPATIENT
Start: 2024-02-13 | End: 2024-02-13 | Stop reason: HOSPADM

## 2024-02-13 RX ORDER — LIDOCAINE HYDROCHLORIDE 20 MG/ML
INJECTION, SOLUTION EPIDURAL; INFILTRATION; INTRACAUDAL; PERINEURAL AS NEEDED
Status: DISCONTINUED | OUTPATIENT
Start: 2024-02-13 | End: 2024-02-13 | Stop reason: SURG

## 2024-02-13 RX ORDER — FENTANYL CITRATE 50 UG/ML
25 INJECTION, SOLUTION INTRAMUSCULAR; INTRAVENOUS
Status: DISCONTINUED | OUTPATIENT
Start: 2024-02-13 | End: 2024-02-13 | Stop reason: HOSPADM

## 2024-02-13 RX ORDER — SODIUM CHLORIDE 0.9 % (FLUSH) 0.9 %
3 SYRINGE (ML) INJECTION AS NEEDED
Status: DISCONTINUED | OUTPATIENT
Start: 2024-02-13 | End: 2024-02-13 | Stop reason: HOSPADM

## 2024-02-13 RX ORDER — NEOSTIGMINE METHYLSULFATE 5 MG/5 ML
SYRINGE (ML) INTRAVENOUS AS NEEDED
Status: DISCONTINUED | OUTPATIENT
Start: 2024-02-13 | End: 2024-02-13 | Stop reason: SURG

## 2024-02-13 RX ORDER — ROCURONIUM BROMIDE 10 MG/ML
INJECTION, SOLUTION INTRAVENOUS AS NEEDED
Status: DISCONTINUED | OUTPATIENT
Start: 2024-02-13 | End: 2024-02-13 | Stop reason: SURG

## 2024-02-13 RX ADMIN — PROPOFOL 150 MG: 10 INJECTION, EMULSION INTRAVENOUS at 08:30

## 2024-02-13 RX ADMIN — ACETAMINOPHEN TAB 500 MG 1000 MG: 500 TAB at 08:15

## 2024-02-13 RX ADMIN — FENTANYL CITRATE 100 MCG: 0.05 INJECTION, SOLUTION INTRAMUSCULAR; INTRAVENOUS at 08:51

## 2024-02-13 RX ADMIN — Medication 100 MCG: at 08:46

## 2024-02-13 RX ADMIN — ROCURONIUM BROMIDE 50 MG: 10 INJECTION, SOLUTION INTRAVENOUS at 08:30

## 2024-02-13 RX ADMIN — GLYCOPYRROLATE 0.4 MG: 0.2 INJECTION INTRAMUSCULAR; INTRAVENOUS at 09:30

## 2024-02-13 RX ADMIN — EPHEDRINE SULFATE 10 MG: 50 INJECTION INTRAVENOUS at 09:09

## 2024-02-13 RX ADMIN — HEPARIN SODIUM 5000 UNITS: 5000 INJECTION INTRAVENOUS; SUBCUTANEOUS at 08:12

## 2024-02-13 RX ADMIN — Medication 3 MG: at 09:30

## 2024-02-13 RX ADMIN — LIDOCAINE HYDROCHLORIDE 100 MG: 20 INJECTION, SOLUTION EPIDURAL; INFILTRATION; INTRACAUDAL; PERINEURAL at 08:30

## 2024-02-13 RX ADMIN — Medication 100 MCG: at 09:02

## 2024-02-13 RX ADMIN — CEFAZOLIN 2000 MG: 2 INJECTION, POWDER, FOR SOLUTION INTRAMUSCULAR; INTRAVENOUS at 08:35

## 2024-02-13 RX ADMIN — ONDANSETRON 4 MG: 2 INJECTION INTRAMUSCULAR; INTRAVENOUS at 09:28

## 2024-02-13 RX ADMIN — DEXAMETHASONE SODIUM PHOSPHATE 8 MG: 4 INJECTION, SOLUTION INTRAMUSCULAR; INTRAVENOUS at 08:39

## 2024-02-13 RX ADMIN — SODIUM CHLORIDE, POTASSIUM CHLORIDE, SODIUM LACTATE AND CALCIUM CHLORIDE: 600; 310; 30; 20 INJECTION, SOLUTION INTRAVENOUS at 09:26

## 2024-02-13 RX ADMIN — SODIUM CHLORIDE, POTASSIUM CHLORIDE, SODIUM LACTATE AND CALCIUM CHLORIDE 1000 ML: 600; 310; 30; 20 INJECTION, SOLUTION INTRAVENOUS at 06:34

## 2024-02-13 RX ADMIN — FENTANYL CITRATE 150 MCG: 0.05 INJECTION, SOLUTION INTRAMUSCULAR; INTRAVENOUS at 08:26

## 2024-02-13 RX ADMIN — Medication 100 MCG: at 08:42

## 2024-02-28 ENCOUNTER — OFFICE VISIT (OUTPATIENT)
Dept: SURGERY | Facility: CLINIC | Age: 64
End: 2024-02-28
Payer: COMMERCIAL

## 2024-02-28 VITALS
HEIGHT: 75 IN | SYSTOLIC BLOOD PRESSURE: 130 MMHG | BODY MASS INDEX: 25.86 KG/M2 | DIASTOLIC BLOOD PRESSURE: 78 MMHG | WEIGHT: 208 LBS

## 2024-02-28 DIAGNOSIS — K40.90 RIGHT INGUINAL HERNIA: ICD-10-CM

## 2024-02-28 DIAGNOSIS — Z98.890 S/P RIGHT INGUINAL HERNIA REPAIR: Primary | ICD-10-CM

## 2024-02-28 DIAGNOSIS — Z87.19 S/P RIGHT INGUINAL HERNIA REPAIR: Primary | ICD-10-CM

## 2024-02-28 PROCEDURE — 99024 POSTOP FOLLOW-UP VISIT: CPT

## 2024-02-28 NOTE — PROGRESS NOTES
"Patient: Melecio Magallon    YOB: 1960    Date: 02/28/2024    Primary Care Provider: Johny Luis MD    Vital Signs:   Vitals:    02/28/24 1357   BP: 130/78   Weight: 94.3 kg (208 lb)   Height: 191 cm (75.2\")       Overall doing well s/p R inguinal hernia repair on 2/13/24 by Dr. Cruz. No fevers. Tolerating diet. Energy improving. Pain improving. No troubles with bowel or bladder function. Wounds healing well. Repair intact.    Results Review:   Pathology and operative findings reviewed with patient.    Op Note by Lakeshia Cruz MD (02/13/2024 08:43)   POSTOPERATIVE DIAGNOSIS: Same, right indirect inguinal hernia       Assessment / Plan:  It has been reiterated that the patient should limit strenuous activity during the post op period and limit lifting to <35 pounds for the first four weeks post op then slowly return to normal.    Diagnoses and all orders for this visit:    1. S/P right inguinal hernia repair (Primary)    2. Right inguinal hernia        Melecio Magallon is 2 weeks s/p inguinal hernia repair. He is overall doing well. At this time, he is able to return to normal activity at 4 weeks post op. This is around 3/12/24. He voiced understanding of this. He will call for an appointment if he has any new problems or concerns. He is agreeable to the plan.     Follow up:     Return if symptoms worsen or fail to improve.      Electronically signed by Leeanne Weir PA-C  02/28/24  15:27 CST                  "

## 2024-05-01 NOTE — PROGRESS NOTES
Chief Complaint  Paroxysmal Afib (3mo F/U) and Results (Holter)    Subjective          Melecio Magallon presents to [facility] CARDIOLOGY for routine follow-up of outpatient testing.  13-day Holter monitor revealed predominantly sinus rhythm with rare supraventricular and ventricular ectopic beats, single 13 beat run of supraventricular tachycardia, no significant ectopy, no significant pauses and no correlated arrhythmia. He has bicuspid aortic valve, aortic valve stenosis, aortic valve insufficiency, and thoracic aortic aneurysm status post aortic valve replacement with 27 mm Inspiris bioprosthetic valve and ascending aorta, aortic root, and aortic hemiarch replacement with 34 mm dacron graft, post op afib, hypertension, hyperlipidemia and left ventricular hypertrophy.  Patient denies chest pain, shortness of breath, palpitations, dizziness, syncope, orthopnea, PND, edema or decreased stamina.  Patient denies any signs of bleeding.    Hypertension  This is a chronic problem. The current episode started more than 1 year ago. The problem is uncontrolled. Pertinent negatives include no anxiety, blurred vision, chest pain, headaches, malaise/fatigue, neck pain, orthopnea, palpitations, peripheral edema, PND, shortness of breath or sweats. Risk factors for coronary artery disease include male gender and dyslipidemia. Current antihypertension treatment includes angiotensin blockers. The current treatment provides moderate improvement. Hypertensive end-organ damage includes left ventricular hypertrophy.   Hyperlipidemia  This is a chronic problem. The current episode started more than 1 year ago. Pertinent negatives include no chest pain or shortness of breath. Current antihyperlipidemic treatment includes statins. Risk factors for coronary artery disease include male sex, hypertension and dyslipidemia.   Atrial Fibrillation  Presents for follow-up visit. Symptoms are negative for an AICD problem,  "bradycardia, chest pain, dizziness, hemodynamic instability, hypertension, hypotension, pacemaker problem, palpitations, shortness of breath, syncope, tachycardia and weakness. The symptoms have been resolved. Past medical history includes atrial fibrillation and hyperlipidemia. There are no medication compliance problems.       Objective   Vital Signs:   /66   Pulse 73   Ht 190.5 cm (75\")   Wt 94.8 kg (209 lb)   SpO2 98%   BMI 26.12 kg/m²     Vitals and nursing note reviewed.   Constitutional:       General: Awake.      Appearance: Normal and healthy appearance. Well-developed, overweight and not in distress.   Eyes:      General: Lids are normal.      Conjunctiva/sclera: Conjunctivae normal.      Pupils: Pupils are equal, round, and reactive to light.   HENT:      Head: Normocephalic and atraumatic.      Nose: Nose normal.   Neck:      Vascular: No JVR. JVD normal.   Pulmonary:      Effort: Pulmonary effort is normal.      Breath sounds: Normal breath sounds. No wheezing. No rhonchi. No rales.   Chest:      Chest wall: Not tender to palpatation.   Cardiovascular:      PMI at left midclavicular line. Normal rate. Regular rhythm. Normal S1. Normal S2.       Murmurs: There is a grade 2/6 harsh midsystolic murmur at the URSB, radiating to the neck. There is a grade 2/4 high frequency blowing decrescendo, early diastolic murmur at the URSB, radiating to the apex.      No gallop.  No click. No rub.   Pulses:     Intact distal pulses.   Edema:     Peripheral edema absent.   Abdominal:      General: Bowel sounds are normal.      Palpations: Abdomen is soft.      Tenderness: There is no abdominal tenderness.   Musculoskeletal: Normal range of motion.         General: No tenderness.      Cervical back: Normal range of motion. Skin:     General: Skin is warm and dry.   Neurological:      General: No focal deficit present.      Mental Status: Alert, oriented to person, place, and time and oriented to person, place " and time.   Psychiatric:         Attention and Perception: Attention and perception normal.         Mood and Affect: Mood and affect normal.         Speech: Speech normal.         Behavior: Behavior normal. Behavior is cooperative.         Thought Content: Thought content normal.         Cognition and Memory: Cognition and memory normal.         Judgment: Judgment normal.        Result Review :   The following data was reviewed by: ANIBAL Kerr on 05/02/2024:  Common labs          12/14/2023    13:44 1/3/2024    08:03 2/6/2024    10:18   Common Labs   Glucose 99   99    BUN 21   19    Creatinine 1.36  1.40  1.25    Sodium 142   139    Potassium 4.6   5.1    Chloride 108   102    Calcium 8.5   9.8    Albumin   4.4    Total Bilirubin   0.3    Alkaline Phosphatase   69    AST (SGOT)   47    ALT (SGPT)   53    WBC 8.88   6.67    Hemoglobin 8.4   12.4    Hematocrit 27.6   39.2    Platelets 204   198      Data reviewed: Cardiology studies 2d echo 4/5/22 and 1/3/2024 and Holter monitor 1/24/2024           Assessment and Plan    Diagnoses and all orders for this visit:    1. Bicuspid aortic valve (Primary)-status post ascending aorta, aortic root, aortic hemiarch and aortic valve replacement 11/21/2023 per Dr. Ramon Kumar at [facility].  Stable.    2. Nonrheumatic aortic valve stenosis-status post aortic valve replacement with 27 mm Inspiris bioprosthetic valve 11/21/2023 per Dr. Ramon Kumar at [facility].  Normally functioning valve on most recent 2D echo 1/3/2024.     3. Thoracic aortic aneurysm without rupture, unspecified part -status post ascending aorta, aortic root, and aortic hemiarch replacement with 34 mm dacron graft per Dr. Ramon Kumar at [facility].  Stable.    4. LVH (left ventricular hypertrophy)-sigmoid shaped ventricular septum.  Stable.    5. Primary hypertension-blood pressure is well-controlled.  Continue metoprolol tartrate.  Monitor and record daily blood  pressure. Report readings consistently higher than 130/80 or consistently lower than 100/60.     6. Mixed hyperlipidemia-management per PCP.  Continue simvastatin.    7. Postoperative atrial fibrillation- following AVR. No known recurrence. No recurrence on recent 13 day holter. [id number] score is 1, making pt intermediate risk of thrombotic event with 1.3% yearly stroke risk. Okay to stop Eliquis per pt request. Continue aspirin 81 mg daily.       Follow Up   Return in about 6 months (around 11/2/2024) for Next scheduled follow up.  Patient was given instructions and counseling regarding his condition or for health maintenance advice. Please see specific information pulled into the AVS if appropriate.

## 2024-05-02 ENCOUNTER — OFFICE VISIT (OUTPATIENT)
Dept: CARDIOLOGY | Facility: CLINIC | Age: 64
End: 2024-05-02
Payer: COMMERCIAL

## 2024-05-02 VITALS
SYSTOLIC BLOOD PRESSURE: 107 MMHG | DIASTOLIC BLOOD PRESSURE: 66 MMHG | HEART RATE: 73 BPM | OXYGEN SATURATION: 98 % | BODY MASS INDEX: 25.99 KG/M2 | WEIGHT: 209 LBS | HEIGHT: 75 IN

## 2024-05-02 DIAGNOSIS — I48.91 POSTOPERATIVE ATRIAL FIBRILLATION: ICD-10-CM

## 2024-05-02 DIAGNOSIS — Q23.1 BICUSPID AORTIC VALVE: Primary | ICD-10-CM

## 2024-05-02 DIAGNOSIS — I10 PRIMARY HYPERTENSION: ICD-10-CM

## 2024-05-02 DIAGNOSIS — I51.7 LVH (LEFT VENTRICULAR HYPERTROPHY): ICD-10-CM

## 2024-05-02 DIAGNOSIS — E78.2 MIXED HYPERLIPIDEMIA: ICD-10-CM

## 2024-05-02 DIAGNOSIS — I35.0 NONRHEUMATIC AORTIC VALVE STENOSIS: ICD-10-CM

## 2024-05-02 DIAGNOSIS — I97.89 POSTOPERATIVE ATRIAL FIBRILLATION: ICD-10-CM

## 2024-05-02 DIAGNOSIS — I71.20 THORACIC AORTIC ANEURYSM WITHOUT RUPTURE, UNSPECIFIED PART: ICD-10-CM

## 2024-05-07 RX ORDER — ROSUVASTATIN CALCIUM 40 MG/1
40 TABLET, COATED ORAL DAILY
Qty: 90 TABLET | Refills: 3 | Status: SHIPPED | OUTPATIENT
Start: 2024-05-07

## 2024-05-08 RX ORDER — EZETIMIBE 10 MG/1
10 TABLET ORAL DAILY
Qty: 30 TABLET | Refills: 11 | Status: SHIPPED | OUTPATIENT
Start: 2024-05-08

## 2024-11-06 ENCOUNTER — OFFICE VISIT (OUTPATIENT)
Dept: CARDIOLOGY | Facility: CLINIC | Age: 64
End: 2024-11-06
Payer: COMMERCIAL

## 2024-11-06 VITALS
SYSTOLIC BLOOD PRESSURE: 127 MMHG | HEIGHT: 75 IN | BODY MASS INDEX: 26.24 KG/M2 | HEART RATE: 56 BPM | WEIGHT: 211 LBS | DIASTOLIC BLOOD PRESSURE: 77 MMHG | OXYGEN SATURATION: 98 %

## 2024-11-06 DIAGNOSIS — I10 PRIMARY HYPERTENSION: ICD-10-CM

## 2024-11-06 DIAGNOSIS — Q23.81 BICUSPID AORTIC VALVE: Primary | ICD-10-CM

## 2024-11-06 DIAGNOSIS — I97.89 POSTOPERATIVE ATRIAL FIBRILLATION: ICD-10-CM

## 2024-11-06 DIAGNOSIS — I48.91 POSTOPERATIVE ATRIAL FIBRILLATION: ICD-10-CM

## 2024-11-06 DIAGNOSIS — I51.7 LVH (LEFT VENTRICULAR HYPERTROPHY): ICD-10-CM

## 2024-11-06 DIAGNOSIS — I71.20 THORACIC AORTIC ANEURYSM WITHOUT RUPTURE, UNSPECIFIED PART: ICD-10-CM

## 2024-11-06 DIAGNOSIS — I35.0 NONRHEUMATIC AORTIC VALVE STENOSIS: ICD-10-CM

## 2024-11-06 DIAGNOSIS — E78.2 MIXED HYPERLIPIDEMIA: ICD-10-CM

## 2024-11-06 NOTE — PROGRESS NOTES
Chief Complaint  Cardiac Valve Problem (6mo F/U) and Hypertension    Subjective          Melecio Magallon presents to [facility] CARDIOLOGY for routine follow-up. He has bicuspid aortic valve, aortic valve stenosis, aortic valve insufficiency, and thoracic aortic aneurysm status post aortic valve replacement with 27 mm Inspiris bioprosthetic valve and ascending aorta, aortic root, and aortic hemiarch replacement with 34 mm dacron graft, post op afib, hypertension, hyperlipidemia and left ventricular hypertrophy.  Patient denies chest pain, shortness of breath, palpitations, dizziness, syncope, orthopnea, PND, edema or decreased stamina.  Patient denies any signs of bleeding.    Hypertension  This is a chronic problem. The current episode started more than 1 year ago. The problem is uncontrolled. Pertinent negatives include no anxiety, blurred vision, chest pain, headaches, malaise/fatigue, neck pain, orthopnea, palpitations, peripheral edema, PND, shortness of breath or sweats. Risk factors for coronary artery disease include male gender and dyslipidemia. Current antihypertension treatment includes angiotensin blockers. The current treatment provides moderate improvement. Hypertensive end-organ damage includes left ventricular hypertrophy.   Hyperlipidemia  This is a chronic problem. The current episode started more than 1 year ago. Pertinent negatives include no chest pain or shortness of breath. Current antihyperlipidemic treatment includes statins. Risk factors for coronary artery disease include male sex, hypertension and dyslipidemia.   Atrial Fibrillation  Presents for follow-up visit. Symptoms are negative for an AICD problem, bradycardia, chest pain, dizziness, hemodynamic instability, hypertension, hypotension, pacemaker problem, palpitations, shortness of breath, syncope, tachycardia and weakness. The symptoms have been resolved. Past medical history includes atrial fibrillation and  "hyperlipidemia. There are no medication compliance problems.     I have reviewed and confirmed the accuracy of the ROS  ANIBAL Kerr    Objective   Vital Signs:   /77   Pulse 56   Ht 190.5 cm (75\")   Wt 95.7 kg (211 lb)   SpO2 98%   BMI 26.37 kg/m²     Vitals and nursing note reviewed.   Constitutional:       General: Awake.      Appearance: Normal and healthy appearance. Well-developed, overweight and not in distress.   Eyes:      General: Lids are normal.      Conjunctiva/sclera: Conjunctivae normal.      Pupils: Pupils are equal, round, and reactive to light.   HENT:      Head: Normocephalic and atraumatic.      Nose: Nose normal.   Neck:      Vascular: No JVR. JVD normal.   Pulmonary:      Effort: Pulmonary effort is normal.      Breath sounds: Normal breath sounds. No wheezing. No rhonchi. No rales.   Chest:      Chest wall: Not tender to palpatation.   Cardiovascular:      PMI at left midclavicular line. Bradycardia present. Regular rhythm. Normal S1. Normal S2.       Murmurs: There is a grade 2/6 harsh midsystolic murmur at the URSB, radiating to the neck. There is a grade 2/4 high frequency blowing decrescendo, early diastolic murmur at the URSB, radiating to the apex.      No gallop.  No click. No rub.   Pulses:     Intact distal pulses.   Edema:     Peripheral edema absent.   Abdominal:      General: Bowel sounds are normal.      Palpations: Abdomen is soft.      Tenderness: There is no abdominal tenderness.   Musculoskeletal: Normal range of motion.         General: No tenderness.      Cervical back: Normal range of motion. Skin:     General: Skin is warm and dry.   Neurological:      General: No focal deficit present.      Mental Status: Alert, oriented to person, place, and time and oriented to person, place and time.   Psychiatric:         Attention and Perception: Attention and perception normal.         Mood and Affect: Mood and affect normal.         Speech: Speech normal.    "      Behavior: Behavior normal. Behavior is cooperative.         Thought Content: Thought content normal.         Cognition and Memory: Cognition and memory normal.         Judgment: Judgment normal.        Result Review :   The following data was reviewed by: ANIBAL Krer on 11/06/2024:  Common labs          12/14/2023    13:44 1/3/2024    08:03 2/6/2024    10:18   Common Labs   Glucose 99   99    BUN 21   19    Creatinine 1.36  1.40  1.25    Sodium 142   139    Potassium 4.6   5.1    Chloride 108   102    Calcium 8.5   9.8    Albumin   4.4    Total Bilirubin   0.3    Alkaline Phosphatase   69    AST (SGOT)   47    ALT (SGPT)   53    WBC 8.88   6.67    Hemoglobin 8.4   12.4    Hematocrit 27.6   39.2    Platelets 204   198      Data reviewed: Cardiology studies 2d echo 4/5/22 and 1/3/2024 and Holter monitor 1/24/2024      ECG 12 Lead    Date/Time: 11/6/2024 9:30 AM  Performed by: Alysia Fitzpatrick APRN    Authorized by: Alysia Fitzpatrick APRN  Comparison: compared with previous ECG from 2/6/2024  Similar to previous ECG  Rhythm: sinus bradycardia  Rate: bradycardic  BPM: 56  QRS axis: normal  Other findings: early repolarization    Clinical impression: abnormal EKG            Assessment and Plan    Diagnoses and all orders for this visit:    1. Bicuspid aortic valve (Primary)-status post ascending aorta, aortic root, aortic hemiarch and aortic valve replacement 11/21/2023 per Dr. Ramon Kumar at [facility].  Stable.    2. Nonrheumatic aortic valve stenosis-status post aortic valve replacement with 27 mm Inspiris bioprosthetic valve 11/21/2023 per Dr. Ramon Kumar at [facility].  Normally functioning valve on most recent 2D echo 1/3/2024.     3. Thoracic aortic aneurysm without rupture, unspecified part -status post ascending aorta, aortic root, and aortic hemiarch replacement with 34 mm dacron graft per Dr. Ramon Kumar at [facility].  Stable.    4. LVH (left  ventricular hypertrophy)-sigmoid shaped ventricular septum.  Stable.    5. Primary hypertension-blood pressure is well-controlled.  Continue metoprolol tartrate.  Monitor and record daily blood pressure. Report readings consistently higher than 130/80 or consistently lower than 100/60.     6. Mixed hyperlipidemia-management per PCP.  Continue simvastatin.    7. Postoperative atrial fibrillation- following AVR. No known recurrence. No recurrence on most recent 13 day holter. [id number] score is 1, making pt intermediate risk of thrombotic event with 1.3% yearly stroke risk. Eliquis discontinued. Continue aspirin 81 mg daily.       Follow Up   Return in about 6 months (around 5/6/2025) for Next scheduled follow up.  Patient was given instructions and counseling regarding his condition or for health maintenance advice. Please see specific information pulled into the AVS if appropriate.

## 2025-01-02 ENCOUNTER — HOSPITAL ENCOUNTER (OUTPATIENT)
Dept: CARDIOLOGY | Facility: HOSPITAL | Age: 65
Discharge: HOME | End: 2025-01-02
Admitting: NURSE PRACTITIONER
Payer: COMMERCIAL

## 2025-01-02 VITALS
WEIGHT: 211 LBS | HEIGHT: 75 IN | BODY MASS INDEX: 26.24 KG/M2 | SYSTOLIC BLOOD PRESSURE: 127 MMHG | DIASTOLIC BLOOD PRESSURE: 77 MMHG

## 2025-01-02 DIAGNOSIS — I35.0 NONRHEUMATIC AORTIC VALVE STENOSIS: ICD-10-CM

## 2025-01-02 LAB
ASCENDING AORTA: 3.2 CM
AV MEAN PRESS GRAD SYS DOP V1V2: 7.8 MMHG
AV VMAX SYS DOP: 194.2 CM/SEC
BH CV ECHO LEFT VENTRICLE GLOBAL LONGITUDINAL STRAIN: -21.4 %
BH CV ECHO MEAS - AO MAX PG: 15.1 MMHG
BH CV ECHO MEAS - AO ROOT DIAM: 3.5 CM
BH CV ECHO MEAS - AO V2 VTI: 43.9 CM
BH CV ECHO MEAS - AVA(I,D): 2.8 CM2
BH CV ECHO MEAS - EDV(CUBED): 137.8 ML
BH CV ECHO MEAS - EDV(MOD-SP2): 123.5 ML
BH CV ECHO MEAS - EDV(MOD-SP4): 131 ML
BH CV ECHO MEAS - EF(MOD-SP2): 73.3 %
BH CV ECHO MEAS - EF(MOD-SP4): 73.6 %
BH CV ECHO MEAS - ESV(CUBED): 12.2 ML
BH CV ECHO MEAS - ESV(MOD-SP2): 33 ML
BH CV ECHO MEAS - ESV(MOD-SP4): 34.6 ML
BH CV ECHO MEAS - FS: 55.4 %
BH CV ECHO MEAS - IVS/LVPW: 1.01 CM
BH CV ECHO MEAS - IVSD: 1.27 CM
BH CV ECHO MEAS - LA DIMENSION: 4.9 CM
BH CV ECHO MEAS - LAT PEAK E' VEL: 14 CM/SEC
BH CV ECHO MEAS - LV DIASTOLIC VOL/BSA (35-75): 58.4 CM2
BH CV ECHO MEAS - LV MASS(C)D: 265.5 GRAMS
BH CV ECHO MEAS - LV MAX PG: 4.6 MMHG
BH CV ECHO MEAS - LV MEAN PG: 2.5 MMHG
BH CV ECHO MEAS - LV SYSTOLIC VOL/BSA (12-30): 15.4 CM2
BH CV ECHO MEAS - LV V1 MAX: 107.6 CM/SEC
BH CV ECHO MEAS - LV V1 VTI: 26.6 CM
BH CV ECHO MEAS - LVIDD: 5.2 CM
BH CV ECHO MEAS - LVIDS: 2.3 CM
BH CV ECHO MEAS - LVOT AREA: 4.5 CM2
BH CV ECHO MEAS - LVOT DIAM: 2.4 CM
BH CV ECHO MEAS - LVPWD: 1.26 CM
BH CV ECHO MEAS - MED PEAK E' VEL: 9 CM/SEC
BH CV ECHO MEAS - MV A MAX VEL: 65.6 CM/SEC
BH CV ECHO MEAS - MV DEC SLOPE: 435.9 CM/SEC2
BH CV ECHO MEAS - MV DEC TIME: 0.22 SEC
BH CV ECHO MEAS - MV E MAX VEL: 96.4 CM/SEC
BH CV ECHO MEAS - MV E/A: 1.47
BH CV ECHO MEAS - MV MAX PG: 5.4 MMHG
BH CV ECHO MEAS - MV MEAN PG: 1.65 MMHG
BH CV ECHO MEAS - MV V2 VTI: 42.5 CM
BH CV ECHO MEAS - MVA(VTI): 2.8 CM2
BH CV ECHO MEAS - PA V2 MAX: 124.7 CM/SEC
BH CV ECHO MEAS - PULM A REVS DUR: 0.21 SEC
BH CV ECHO MEAS - PULM A REVS VEL: 45.9 CM/SEC
BH CV ECHO MEAS - RV MAX PG: 4 MMHG
BH CV ECHO MEAS - RV V1 MAX: 100 CM/SEC
BH CV ECHO MEAS - RV V1 VTI: 21.5 CM
BH CV ECHO MEAS - RVDD: 3.5 CM
BH CV ECHO MEAS - SV(LVOT): 120.7 ML
BH CV ECHO MEAS - SV(MOD-SP2): 90.5 ML
BH CV ECHO MEAS - SV(MOD-SP4): 96.4 ML
BH CV ECHO MEAS - SVI(LVOT): 53.8 ML/M2
BH CV ECHO MEAS - SVI(MOD-SP2): 40.3 ML/M2
BH CV ECHO MEAS - SVI(MOD-SP4): 43 ML/M2
BH CV ECHO MEAS - TAPSE (>1.6): 2.3 CM
BH CV ECHO MEAS - TR MAX PG: 25.5 MMHG
BH CV ECHO MEAS - TR MAX VEL: 252.1 CM/SEC
BH CV ECHO MEASUREMENTS AVERAGE E/E' RATIO: 8.38
BH CV XLRA - RV BASE: 4.8 CM
BH CV XLRA - RV LENGTH: 7 CM
BH CV XLRA - RV MID: 3.6 CM
BH CV XLRA - TDI S': 12 CM/SEC
IVRT: 69 MS
LEFT ATRIUM VOLUME INDEX: 39 ML/M2
LEFT ATRIUM VOLUME: 82 ML
LV EF BIPLANE MOD: 73 %

## 2025-01-02 PROCEDURE — 93356 MYOCRD STRAIN IMG SPCKL TRCK: CPT

## 2025-01-02 PROCEDURE — 93306 TTE W/DOPPLER COMPLETE: CPT

## 2025-01-09 ENCOUNTER — OFFICE VISIT (OUTPATIENT)
Dept: CARDIAC SURGERY | Facility: CLINIC | Age: 65
End: 2025-01-09
Payer: COMMERCIAL

## 2025-01-09 VITALS
HEART RATE: 60 BPM | DIASTOLIC BLOOD PRESSURE: 68 MMHG | OXYGEN SATURATION: 99 % | BODY MASS INDEX: 26.33 KG/M2 | HEIGHT: 75 IN | WEIGHT: 211.8 LBS | SYSTOLIC BLOOD PRESSURE: 114 MMHG

## 2025-01-09 DIAGNOSIS — Q23.81 BICUSPID AORTIC VALVE: ICD-10-CM

## 2025-01-09 DIAGNOSIS — I71.21 ANEURYSM OF ASCENDING AORTA WITHOUT RUPTURE: Primary | ICD-10-CM

## 2025-01-09 PROCEDURE — 99214 OFFICE O/P EST MOD 30 MIN: CPT | Performed by: NURSE PRACTITIONER

## 2025-01-09 NOTE — PROGRESS NOTES
Subjective   Chief Complaint   Patient presents with    Thoracic Aneurysm     Pt is here for 1 year follow up w/ echo    Pt had aortic root, ascending aortic arch/hemiarch replacement on 11/21/2023         Patient ID: Melecio Magallon is a 64 y.o. male who is here for follow-up having had Aortic Root Replacement with [employer] (34mm Dacron graft, 27mm Inspiris valve), Ascending Aortic Replacement with Hemiarch Replacement with 28mm Side Branched Dacron graft by Dr. Kumar on 11/21/2023     History of Present Illness  Mr. Magallon is a 63-year-old male who underwent the above listed procedure by Dr. Kumar on 11/21/2023.  Postoperative recovery was overall unremarkable.  Sternum has healed without remark and he denies any sternal instability.  He has been following with cardiology, and has follow-up with ANIBAL Moore in May of this year.  He was last evaluated in the office 1 year ago for 6-week postop follow-up visit and was advised follow-up today with repeat echocardiogram.  Overall he states he is doing very well.  No new issues since most recent follow-up.     Post operative recovery was uneventful without any major complications. Sleep habits are fair. Pain control has been good. No fevers/sweats/chills. No drainage from incisions.  No sternal clicks. No chest pain or shortness of breath. Appetite is good.  He is not diabetic and he does not smoke.  Ambulating 15-20 minutes twice daily.     The following portions of the patient's history were reviewed and updated as appropriate: allergies, current medications, past family history, past medical history, past social history, past surgical history and problem list.    Review of Systems   Constitutional:  Negative for chills, diaphoresis, fatigue and fever.   HENT:  Negative for trouble swallowing and voice change.    Eyes:  Negative for visual disturbance.   Respiratory:  Negative for chest tightness and shortness of breath.    Cardiovascular:  Negative for  "chest pain, palpitations and leg swelling.   Gastrointestinal:  Negative for abdominal pain, diarrhea, nausea and vomiting.   Musculoskeletal:  Negative for arthralgias and myalgias.   Skin:  Negative for color change, pallor, rash and wound.   Neurological:  Negative for dizziness, syncope and light-headedness.   Psychiatric/Behavioral:  Negative for agitation, confusion and sleep disturbance.        Objective   Visit Vitals  /68   Pulse 60   Ht 190.5 cm (75\")   Wt 96.1 kg (211 lb 12.8 oz)   SpO2 99%   BMI 26.47 kg/m²       Physical Exam  Vitals reviewed.   Constitutional:       General: He is not in acute distress.  HENT:      Head: Normocephalic.   Eyes:      Pupils: Pupils are equal, round, and reactive to light.   Cardiovascular:      Rate and Rhythm: Normal rate and regular rhythm.      Heart sounds: Normal heart sounds. No murmur heard.  Pulmonary:      Breath sounds: Normal breath sounds. No wheezing or rales.   Abdominal:      General: There is no distension.      Palpations: Abdomen is soft.      Tenderness: There is no abdominal tenderness.   Musculoskeletal:         General: No swelling or tenderness.      Left lower leg: No edema.   Skin:     General: Skin is warm.      Comments: Sternum is stable, no clicks.  Sternal incision is well-healed   Neurological:      General: No focal deficit present.      Mental Status: He is alert and oriented to person, place, and time.   Psychiatric:         Mood and Affect: Mood normal.         Behavior: Behavior normal.         Thought Content: Thought content normal.         Judgment: Judgment normal.             Assessment & Plan     Transthoracic echo: 1/2/2025        Diagnoses and all orders for this visit:    1. Aneurysm of ascending aorta without rupture (Primary)    2. Bicuspid aortic valve           Overall, Melecio Magallon is doing well.  He is here today for routine 1 year postop follow-up.  He reports no new issues.  He states he is feeling well.  " Echocardiogram from 1/2/2025 reviewed with Dr. Kumar and reveals EF 66 to 70% and aortic valve peak and mean gradients are within defined limits.  He does continue to follow with cardiology and has scheduled follow-up in May of this year.  He continues on low-dose aspirin.  He has been advised he can follow-up with CT surgery on an as-needed basis.  Keep scheduled follow-ups with cardiology.  Please do not hesitate to call with questions or concerns patient verbalizes understanding and is agreeable to this plan.      BMI is within normal parameters. No other follow-up for BMI required.    Melecio Magallon is a non-smoker and therefore does not need tobacco cessation education/counseling.

## 2025-01-22 RX ORDER — AMOXICILLIN 500 MG/1
CAPSULE ORAL
Qty: 4 CAPSULE | Refills: 0 | OUTPATIENT
Start: 2025-01-22

## 2025-01-23 RX ORDER — AMOXICILLIN 500 MG/1
2000 CAPSULE ORAL ONCE
Qty: 4 CAPSULE | Refills: 0 | Status: SHIPPED | OUTPATIENT
Start: 2025-01-23 | End: 2025-01-23

## 2025-02-11 RX ORDER — METOPROLOL TARTRATE 25 MG/1
25 TABLET, FILM COATED ORAL EVERY 12 HOURS
Qty: 180 TABLET | Refills: 3 | Status: SHIPPED | OUTPATIENT
Start: 2025-02-11

## 2025-02-11 NOTE — TELEPHONE ENCOUNTER
LOV - 11/2024    Primary hypertension-blood pressure is well-controlled.  Continue metoprolol tartrate.  Monitor and record daily blood pressure. Report readings consistently higher than 130/80 or consistently lower than 100/60.

## 2025-04-29 RX ORDER — ROSUVASTATIN CALCIUM 40 MG/1
40 TABLET, COATED ORAL DAILY
Qty: 90 TABLET | Refills: 3 | Status: SHIPPED | OUTPATIENT
Start: 2025-04-29

## 2025-05-06 NOTE — PROGRESS NOTES
Chief Complaint  Aortic valve stenosis (6mo F/U) and Hypertension    Subjective          Melecio Magallon presents to [facility] for routine follow-up. He has bicuspid aortic valve, aortic valve stenosis, aortic valve insufficiency, and thoracic aortic aneurysm status post aortic valve replacement with 27 mm Inspiris bioprosthetic valve and ascending aorta, aortic root, and aortic hemiarch replacement with 34 mm dacron graft, post op afib, hypertension, hyperlipidemia and left ventricular hypertrophy.  Patient denies chest pain, shortness of breath, palpitations, dizziness, syncope, orthopnea, PND, edema or decreased stamina.  Patient denies any signs of bleeding.    Hypertension  This is a chronic problem. The current episode started more than 1 year ago. The problem is uncontrolled. Pertinent negatives include no anxiety, blurred vision, chest pain, headaches, malaise/fatigue, neck pain, orthopnea, palpitations, peripheral edema, PND, shortness of breath or sweats. Risk factors for coronary artery disease include male gender and dyslipidemia. Current antihypertension treatment includes angiotensin blockers. The current treatment provides moderate improvement. Hypertensive end-organ damage includes left ventricular hypertrophy.   Hyperlipidemia  This is a chronic problem. The current episode started more than 1 year ago. Pertinent negatives include no chest pain or shortness of breath. Current antihyperlipidemic treatment includes statins. Risk factors for coronary artery disease include male sex, hypertension and dyslipidemia.   Atrial Fibrillation  Presents for follow-up visit. Symptoms are negative for an AICD problem, bradycardia, chest pain, dizziness, hemodynamic instability, hypertension, hypotension, pacemaker problem, palpitations, shortness of breath, syncope, tachycardia and weakness. The symptoms have been resolved. Past medical history includes atrial fibrillation and  "hyperlipidemia. There are no medication compliance problems.     I have reviewed and confirmed the accuracy of the ROS  ANIBAL Kerr    Objective   Vital Signs:   /65   Pulse 59   Ht 190.5 cm (75\")   Wt 95.3 kg (210 lb)   SpO2 99%   BMI 26.25 kg/m²     Vitals and nursing note reviewed.   Constitutional:       General: Awake.      Appearance: Normal and healthy appearance. Well-developed, overweight and not in distress.   Eyes:      General: Lids are normal.      Conjunctiva/sclera: Conjunctivae normal.      Pupils: Pupils are equal, round, and reactive to light.   HENT:      Head: Normocephalic and atraumatic.      Nose: Nose normal.   Neck:      Vascular: No JVR. JVD normal.   Pulmonary:      Effort: Pulmonary effort is normal.      Breath sounds: Normal breath sounds. No decreased breath sounds. No wheezing. No rhonchi. No rales.   Chest:      Chest wall: Not tender to palpatation.   Cardiovascular:      PMI at left midclavicular line. Bradycardia present. Regular rhythm. Normal S1. Normal S2.       Murmurs: There is a grade 2/6 harsh midsystolic murmur at the URSB, radiating to the neck. There is a grade 2/4 high frequency blowing decrescendo, early diastolic murmur at the URSB, radiating to the apex.      No gallop.  No click. No rub.   Pulses:     Intact distal pulses.   Edema:     Peripheral edema absent.   Abdominal:      General: Bowel sounds are normal.      Palpations: Abdomen is soft.      Tenderness: There is no abdominal tenderness.   Musculoskeletal: Normal range of motion.         General: No tenderness.      Cervical back: Normal range of motion. Skin:     General: Skin is warm and dry.   Neurological:      General: No focal deficit present.      Mental Status: Alert, oriented to person, place, and time and oriented to person, place and time.   Psychiatric:         Attention and Perception: Attention and perception normal.         Mood and Affect: Mood and affect normal.         " Speech: Speech normal.         Behavior: Behavior normal. Behavior is cooperative.         Thought Content: Thought content normal.         Cognition and Memory: Cognition and memory normal.         Judgment: Judgment normal.        Result Review :   The following data was reviewed by: ANIBAL Kerr on 05/07/2025:      Data reviewed: Cardiology studies 2d echo 4/5/22 and 1/3/2024 and Holter monitor 1/24/2024      ECG 12 Lead    Date/Time: 5/7/2025 8:52 AM  Performed by: Alysia Fitzpatrick APRN    Authorized by: Alysia Fitzpatrick APRN  Comparison: compared with previous ECG from 11/6/2024  Similar to previous ECG  Rhythm: sinus bradycardia  Rate: bradycardic  BPM: 59  QRS axis: normal  Other findings: early repolarization    Clinical impression: abnormal EKG            Assessment and Plan    Diagnoses and all orders for this visit:    1. Bicuspid aortic valve (Primary)-status post ascending aorta, aortic root, aortic hemiarch and aortic valve replacement 11/21/2023 per Dr. Ramon Kumar at [facility].  Stable.    2. Nonrheumatic aortic valve stenosis-status post aortic valve replacement with 27 mm Inspiris bioprosthetic valve 11/21/2023 per Dr. Ramon Kumar at [facility].  Normally functioning valve on most recent 2D echo 1/3/2024.     3. Thoracic aortic aneurysm without rupture, unspecified part -status post ascending aorta, aortic root, and aortic hemiarch replacement with 34 mm dacron graft per Dr. Ramon Kumar at [facility].  Stable.    4. LVH (left ventricular hypertrophy)-sigmoid shaped ventricular septum. Pt is at risk for heart failure. Reviewed signs and symptoms of CHF and what to report with the patient. Patient instructed to restrict sodium and weigh daily. Report weight gain of greater than 2 lbs overnight or 5 lbs in 1 week. Pt verbalized understanding of instructions and plan of care.     5. Primary hypertension-blood pressure is well-controlled.  Continue  metoprolol tartrate.  Monitor and record daily blood pressure. Report readings consistently higher than 130/80 or consistently lower than 100/60.     6. Mixed hyperlipidemia-management per PCP.  Continue simvastatin.    7. Postoperative atrial fibrillation- following AVR. No known recurrence. No recurrence on most recent 13 day holter. [id number] score is 1, making pt intermediate risk of thrombotic event with 1.3% yearly stroke risk. Eliquis discontinued. Continue aspirin 81 mg daily.       Follow Up   Return in about 6 months (around 11/7/2025) for Next scheduled follow up.  Patient was given instructions and counseling regarding his condition or for health maintenance advice. Please see specific information pulled into the AVS if appropriate.

## 2025-05-07 ENCOUNTER — OFFICE VISIT (OUTPATIENT)
Dept: CARDIOLOGY | Facility: CLINIC | Age: 65
End: 2025-05-07
Payer: COMMERCIAL

## 2025-05-07 VITALS
HEIGHT: 75 IN | OXYGEN SATURATION: 99 % | WEIGHT: 210 LBS | BODY MASS INDEX: 26.11 KG/M2 | DIASTOLIC BLOOD PRESSURE: 65 MMHG | SYSTOLIC BLOOD PRESSURE: 103 MMHG | HEART RATE: 59 BPM

## 2025-05-07 DIAGNOSIS — E78.2 MIXED HYPERLIPIDEMIA: ICD-10-CM

## 2025-05-07 DIAGNOSIS — I35.0 NONRHEUMATIC AORTIC VALVE STENOSIS: ICD-10-CM

## 2025-05-07 DIAGNOSIS — I51.7 LVH (LEFT VENTRICULAR HYPERTROPHY): ICD-10-CM

## 2025-05-07 DIAGNOSIS — I71.20 THORACIC AORTIC ANEURYSM WITHOUT RUPTURE, UNSPECIFIED PART: ICD-10-CM

## 2025-05-07 DIAGNOSIS — I48.91 POSTOPERATIVE ATRIAL FIBRILLATION: ICD-10-CM

## 2025-05-07 DIAGNOSIS — Q23.81 BICUSPID AORTIC VALVE: Primary | ICD-10-CM

## 2025-05-07 DIAGNOSIS — I97.89 POSTOPERATIVE ATRIAL FIBRILLATION: ICD-10-CM

## 2025-05-07 DIAGNOSIS — I10 PRIMARY HYPERTENSION: ICD-10-CM

## 2025-05-07 RX ORDER — EZETIMIBE 10 MG/1
10 TABLET ORAL DAILY
Qty: 90 TABLET | Refills: 3 | Status: SHIPPED | OUTPATIENT
Start: 2025-05-07

## 2025-07-25 RX ORDER — AMOXICILLIN 500 MG/1
2000 CAPSULE ORAL ONCE
Qty: 4 CAPSULE | Refills: 0 | Status: SHIPPED | OUTPATIENT
Start: 2025-07-25 | End: 2025-07-25

## (undated) DEVICE — BLAKE SILICONE DRAINS CARDIO CONNECTOR 2:1: Brand: BLAKE

## (undated) DEVICE — KT NDL GUIDE STRL 18GA

## (undated) DEVICE — NDL HYPO STD REG/BVL 22G 1.5IN

## (undated) DEVICE — DEV COMPR RADL PRELUDESYNCEZ 30ML 32CM

## (undated) DEVICE — SOL IRR NACL 0.9PCT BT 1000ML

## (undated) DEVICE — DRAIN,WOUND,ROUND,24FR,5/16",FULL-FLUTED: Brand: MEDLINE

## (undated) DEVICE — SYRINGE,PISTON,IRRIGATION,60ML,STERILE: Brand: MEDLINE

## (undated) DEVICE — INTENDED FOR TISSUE SEPARATION, AND OTHER PROCEDURES THAT REQUIRE A SHARP SURGICAL BLADE TO PUNCTURE OR CUT.: Brand: BARD-PARKER ® STAINLESS STEEL BLADES

## (undated) DEVICE — SUREFIT, DUAL DISPERSIVE ELECTRODE, CONTACT QUALITY MONITOR: Brand: SUREFIT

## (undated) DEVICE — CLTH CLENS READYCLEANSE PERI CARE PK/5

## (undated) DEVICE — SEAL UNIV DAVINCI/X/XI 5TO12MM

## (undated) DEVICE — SUT GORE TT9 CV6 30IN 6M06A

## (undated) DEVICE — ADHS SKIN PREMIERPRO EXOFIN TOPICAL HI/VISC .5ML

## (undated) DEVICE — MODEL BT2000 P/N 700287-012KIT CONTENTS: MANIFOLD WITH SALINE AND CONTRAST PORTS, SALINE TUBING WITH SPIKE AND HAND SYRINGE, TRANSDUCER: Brand: BT2000 AUTOMATED MANIFOLD KIT

## (undated) DEVICE — SUP ARMBRD ART/LINE BLU

## (undated) DEVICE — GLIDESHEATH SLENDER STAINLESS STEEL KIT: Brand: GLIDESHEATH SLENDER

## (undated) DEVICE — SUT ETHIB 2/0 SH SH 36IN X523H

## (undated) DEVICE — CATH IV ANGIO FEP 14GA 5.25IN ORNG 10PK

## (undated) DEVICE — MODEL AT P65, P/N 701554-001KIT CONTENTS: HAND CONTROLLER, 3-WAY HIGH-PRESSURE STOPCOCK WITH ROTATING END AND PREMIUM HIGH-PRESSURE TUBING: Brand: ANGIOTOUCH® KIT

## (undated) DEVICE — THE SINGLE USE ETRAP – POLYP TRAP IS USED FOR SUCTION RETRIEVAL OF ENDOSCOPICALLY REMOVED POLYPS.: Brand: ETRAP

## (undated) DEVICE — GLV SURG BIOGEL M LTX PF 7 1/2

## (undated) DEVICE — KT CLN CLEANOR SCPE

## (undated) DEVICE — OBT BLADLES ENDOWRIST DAVINCI/X/XI 8MM DISP

## (undated) DEVICE — DRAPE,ANGIO,BRACH,STERILE,38X44: Brand: MEDLINE

## (undated) DEVICE — CANN NASL ETCO2 LO/FLO A/

## (undated) DEVICE — PK DAVINCI 30

## (undated) DEVICE — YANKAUER,BULB TIP WITH VENT: Brand: ARGYLE

## (undated) DEVICE — PK POSTN TRENGUARD450 PROC

## (undated) DEVICE — SNAR POLYP CAPTIVATOR RND STFF 2.4 240CM 10MM 1P/U

## (undated) DEVICE — TRAP FLD MINIVAC MEGADYNE 100ML

## (undated) DEVICE — MANIFLD WAST MGMT SYS NEPTUNE2 4PT

## (undated) DEVICE — NDL CNTR 40CT FM MAG: Brand: MEDLINE INDUSTRIES, INC.

## (undated) DEVICE — SUT SILK 2/0 FS BLK 18IN 685G

## (undated) DEVICE — Device: Brand: RETRACT-O-TAPE 18G X 30.5CM BLUNT NEEDLE

## (undated) DEVICE — GLV SURG SENSICARE W/ALOE PF LF SZ6 STRL

## (undated) DEVICE — SUT PROLN 4/0 RB1 36IN 4PK M8557

## (undated) DEVICE — CUFF,BP,DISP,1 TUBE,ADULT,HP: Brand: MEDLINE

## (undated) DEVICE — CATH IV JELCO 18GA 10 1 3/4 IN

## (undated) DEVICE — STERNUM BLADE, OFFSET (32.0 X 0.8 X 6.3MM)

## (undated) DEVICE — SUT VIC 2/0 SH 27IN UD VCP417H

## (undated) DEVICE — CAUTRY HI TEMP FINETP

## (undated) DEVICE — SOLIDIFIER LIQUI LOC PLUS 2000CC

## (undated) DEVICE — SUT PROLN 3/0 SH D/A 36IN 8522H

## (undated) DEVICE — ELECTRD BLD EZ CLN MOD XLNG 2.75IN

## (undated) DEVICE — DRSNG SURESITE WNDW 4X4.5

## (undated) DEVICE — SUT MNCRYL 4/0 PS2 27IN UD MCP426H

## (undated) DEVICE — RADIFOCUS OPTITORQUE ANGIOGRAPHIC CATHETER: Brand: OPTITORQUE

## (undated) DEVICE — PAD, DEFIB, ADULT, RADIOTRANS, PHYSIO: Brand: MEDLINE

## (undated) DEVICE — PK OPN HEART 30

## (undated) DEVICE — ST TBG PNEUMOCLEAR EVAC SMOKE HIFLO

## (undated) DEVICE — SENSR O2 OXIMAX FNGR A/ 18IN NONSTR

## (undated) DEVICE — SUPPLIED AS A PAIR FOR USE IN JAWS OF RESUABLE CLAMPS.: Brand: INTRACK® INSERTS

## (undated) DEVICE — SUT POLY BR TP 2STRND 1/8X30IN

## (undated) DEVICE — SYR LL TP 10ML STRL

## (undated) DEVICE — GLV SURG BIOGEL LTX PF 6 1/2

## (undated) DEVICE — SYR LUERLOK 20CC BX/50

## (undated) DEVICE — THE CHANNEL CLEANING BRUSH IS A NYLON FLEXI BRUSH ATTACHED TO A FLEXIBLE PLASTIC SHEATH DESIGNED TO SAFELY REMOVE DEBRIS FROM FLEXIBLE ENDOSCOPES.

## (undated) DEVICE — 6 FOOT DISPOSABLE EXTENSION CABLE WITH SAFE CONNECT / SCREW-DOWN

## (undated) DEVICE — Device: Brand: DEFENDO AIR/WATER/SUCTION AND BIOPSY VALVE

## (undated) DEVICE — DRP ARM DAVINCI/X/XI DISP BX20

## (undated) DEVICE — PK CATH CARD 30 CA/4

## (undated) DEVICE — SUT PROLN 5/0 RB2 D/A 30IN 8710H

## (undated) DEVICE — BAPTIST TURNOVER KIT: Brand: MEDLINE INDUSTRIES, INC.

## (undated) DEVICE — MASK,OXYGEN,MED CONC,ADLT,7' TUB, UC: Brand: PENDING

## (undated) DEVICE — PK SET UP ANES OPN HEART 30